# Patient Record
Sex: FEMALE | Race: WHITE | NOT HISPANIC OR LATINO | Employment: FULL TIME | URBAN - METROPOLITAN AREA
[De-identification: names, ages, dates, MRNs, and addresses within clinical notes are randomized per-mention and may not be internally consistent; named-entity substitution may affect disease eponyms.]

---

## 2023-03-26 ENCOUNTER — HOSPITAL ENCOUNTER (EMERGENCY)
Facility: HOSPITAL | Age: 33
Discharge: HOME | End: 2023-03-26
Attending: EMERGENCY MEDICINE

## 2023-03-26 ENCOUNTER — APPOINTMENT (EMERGENCY)
Dept: CT IMAGING | Facility: HOSPITAL | Age: 33
End: 2023-03-26

## 2023-03-26 ENCOUNTER — APPOINTMENT (EMERGENCY)
Dept: RADIOLOGY | Facility: HOSPITAL | Age: 33
End: 2023-03-26

## 2023-03-26 VITALS
TEMPERATURE: 98.1 F | DIASTOLIC BLOOD PRESSURE: 82 MMHG | RESPIRATION RATE: 20 BRPM | SYSTOLIC BLOOD PRESSURE: 123 MMHG | HEART RATE: 78 BPM | OXYGEN SATURATION: 100 %

## 2023-03-26 DIAGNOSIS — M62.830 SPASM OF THORACIC BACK MUSCLE: ICD-10-CM

## 2023-03-26 DIAGNOSIS — M54.6 ACUTE LEFT-SIDED THORACIC BACK PAIN: Primary | ICD-10-CM

## 2023-03-26 LAB
ANION GAP SERPL CALCULATED.3IONS-SCNC: 7 MMOL/L (ref 4–13)
BASOPHILS # BLD AUTO: 0.05 THOUSANDS/ÂΜL (ref 0–0.1)
BASOPHILS NFR BLD AUTO: 0 % (ref 0–1)
BUN SERPL-MCNC: 14 MG/DL (ref 5–25)
CALCIUM SERPL-MCNC: 9.3 MG/DL (ref 8.4–10.2)
CHLORIDE SERPL-SCNC: 111 MMOL/L (ref 96–108)
CO2 SERPL-SCNC: 23 MMOL/L (ref 21–32)
CREAT SERPL-MCNC: 0.83 MG/DL (ref 0.6–1.3)
D DIMER PPP FEU-MCNC: 0.3 UG/ML FEU
EOSINOPHIL # BLD AUTO: 0.09 THOUSAND/ÂΜL (ref 0–0.61)
EOSINOPHIL NFR BLD AUTO: 1 % (ref 0–6)
ERYTHROCYTE [DISTWIDTH] IN BLOOD BY AUTOMATED COUNT: 12.3 % (ref 11.6–15.1)
GFR SERPL CREATININE-BSD FRML MDRD: 92 ML/MIN/1.73SQ M
GLUCOSE SERPL-MCNC: 91 MG/DL (ref 65–140)
HCT VFR BLD AUTO: 38 % (ref 34.8–46.1)
HGB BLD-MCNC: 12.4 G/DL (ref 11.5–15.4)
IMM GRANULOCYTES # BLD AUTO: 0.03 THOUSAND/UL (ref 0–0.2)
IMM GRANULOCYTES NFR BLD AUTO: 0 % (ref 0–2)
LYMPHOCYTES # BLD AUTO: 3.5 THOUSANDS/ÂΜL (ref 0.6–4.47)
LYMPHOCYTES NFR BLD AUTO: 30 % (ref 14–44)
MCH RBC QN AUTO: 31.8 PG (ref 26.8–34.3)
MCHC RBC AUTO-ENTMCNC: 32.6 G/DL (ref 31.4–37.4)
MCV RBC AUTO: 97 FL (ref 82–98)
MONOCYTES # BLD AUTO: 0.79 THOUSAND/ÂΜL (ref 0.17–1.22)
MONOCYTES NFR BLD AUTO: 7 % (ref 4–12)
NEUTROPHILS # BLD AUTO: 7.21 THOUSANDS/ÂΜL (ref 1.85–7.62)
NEUTS SEG NFR BLD AUTO: 62 % (ref 43–75)
NRBC BLD AUTO-RTO: 0 /100 WBCS
PLATELET # BLD AUTO: 313 THOUSANDS/UL (ref 149–390)
PMV BLD AUTO: 9.6 FL (ref 8.9–12.7)
POTASSIUM SERPL-SCNC: 4 MMOL/L (ref 3.5–5.3)
RBC # BLD AUTO: 3.9 MILLION/UL (ref 3.81–5.12)
SODIUM SERPL-SCNC: 141 MMOL/L (ref 135–147)
WBC # BLD AUTO: 11.67 THOUSAND/UL (ref 4.31–10.16)

## 2023-03-26 RX ORDER — METHOCARBAMOL 500 MG/1
500 TABLET, FILM COATED ORAL 2 TIMES DAILY
Qty: 20 TABLET | Refills: 0 | Status: SHIPPED | OUTPATIENT
Start: 2023-03-26

## 2023-03-26 RX ORDER — OXYCODONE HYDROCHLORIDE 5 MG/1
5 TABLET ORAL EVERY 6 HOURS PRN
Qty: 4 TABLET | Refills: 0 | Status: SHIPPED | OUTPATIENT
Start: 2023-03-26 | End: 2023-03-27

## 2023-03-26 RX ORDER — LIDOCAINE 50 MG/G
1 PATCH TOPICAL ONCE
Status: DISCONTINUED | OUTPATIENT
Start: 2023-03-26 | End: 2023-03-27 | Stop reason: HOSPADM

## 2023-03-26 RX ORDER — OXYCODONE HYDROCHLORIDE 5 MG/1
5 TABLET ORAL ONCE
Status: COMPLETED | OUTPATIENT
Start: 2023-03-26 | End: 2023-03-26

## 2023-03-26 RX ORDER — ACETAMINOPHEN 325 MG/1
975 TABLET ORAL ONCE
Status: COMPLETED | OUTPATIENT
Start: 2023-03-26 | End: 2023-03-26

## 2023-03-26 RX ORDER — LIDOCAINE 50 MG/G
1 PATCH TOPICAL DAILY
Qty: 15 PATCH | Refills: 0 | Status: SHIPPED | OUTPATIENT
Start: 2023-03-26

## 2023-03-26 RX ORDER — KETOROLAC TROMETHAMINE 30 MG/ML
15 INJECTION, SOLUTION INTRAMUSCULAR; INTRAVENOUS ONCE
Status: COMPLETED | OUTPATIENT
Start: 2023-03-26 | End: 2023-03-26

## 2023-03-26 RX ORDER — METHOCARBAMOL 500 MG/1
500 TABLET, FILM COATED ORAL ONCE
Status: COMPLETED | OUTPATIENT
Start: 2023-03-26 | End: 2023-03-26

## 2023-03-26 RX ADMIN — METHOCARBAMOL 500 MG: 500 TABLET ORAL at 19:14

## 2023-03-26 RX ADMIN — LIDOCAINE 1 PATCH: 50 PATCH CUTANEOUS at 19:12

## 2023-03-26 RX ADMIN — ACETAMINOPHEN 975 MG: 325 TABLET ORAL at 19:12

## 2023-03-26 RX ADMIN — KETOROLAC TROMETHAMINE 15 MG: 30 INJECTION, SOLUTION INTRAMUSCULAR; INTRAVENOUS at 20:55

## 2023-03-26 RX ADMIN — OXYCODONE HYDROCHLORIDE 5 MG: 5 TABLET ORAL at 20:55

## 2023-03-26 NOTE — Clinical Note
Florencia Hooker was seen and treated in our emergency department on 3/26/2023  Diagnosis:     Paty Lowe  may return to work on return date  She may return on this date: 03/29/2023         If you have any questions or concerns, please don't hesitate to call        Jimbo Mario PA-C    ______________________________           _______________          _______________  Hospital Representative                              Date                                Time

## 2023-03-26 NOTE — ED PROVIDER NOTES
tHistory  Chief Complaint   Patient presents with   • Back Pain     Back pain that has been off and on since Friday  Denies injuries  Patient is a 20-year-old female presenting to the ED for evaluation of left-sided thoracic back pain x3 days  Patient states that she developed left-sided back pain on Friday  The pain is over the left mid to upper back, primarily at the level of the scapula  She denies any falls, trauma, injuries or heavy lifting prior to the onset of pain  She was seen at urgent care on Friday and prescribed Toradol  She did not  this medication initially as her back pain seemed to be resolving yesterday; however, she woke up this morning with worsening pain and tried taking a Toradol this morning without relief  She has been applying heat, lidocaine, etc without relief  She took another NSAID (Derrek's) around 4PM with no relief  She feels that the pain is making it difficult to take a deep breath  She also reports some pain and tingling down the left arm on Friday and today, no weakness  She denies any fevers, vomiting, chest pain, SOB, palpitations, abdominal pain, vomiting, diarrhea, constipation or urinary symptoms  Prior to Admission Medications   Prescriptions Last Dose Informant Patient Reported? Taking? Lidocaine Viscous HCl (XYLOCAINE) 2 % mucosal solution   No No   Sig: Mix 3 tsp (15 ml) in glass of water and gargle and spit every 6 hours as needed for throat pain  Avoid swallowing if possible  PARoxetine (PAXIL) 20 mg tablet   No No   Sig: Take 1 tablet (20 mg total) by mouth daily   Vestura 3-0 02 MG per tablet   No No   Sig: TAKE 1 TABLET BY MOUTH EVERY DAY   Patient not taking: Reported on 12/23/2022   butalbital-acetaminophen-caffeine-codeine (FIORICET WITH CODEINE) -33-30 MG per capsule   No No   Sig: TAKE 1 CAPSULE BY MOUTH EVERY 6 HOURS AS NEEDED FOR HEADACHES     cholecalciferol (VITAMIN D3) 1,000 units tablet   No No   Sig: TAKE 1 TABLET BY MOUTH EVERY DAY   hydrOXYzine HCL (ATARAX) 25 mg tablet   No No   Sig: Take 1 tablet (25 mg total) by mouth daily at bedtime as needed for anxiety (insomnia)   Patient not taking: Reported on 7/29/2022   methocarbamol (ROBAXIN) 500 mg tablet   No No   Sig: Take 1 tablet (500 mg total) by mouth daily at bedtime   Patient not taking: Reported on 12/23/2022   ondansetron (ZOFRAN) 4 mg tablet   No No   Sig: TAKE 1 TABLET BY MOUTH EVERY 8 HOURS AS NEEDED FOR NAUSEA AND VOMITING   topiramate (TOPAMAX) 50 MG tablet   No No   Sig: TAKE 1 TABLET BY MOUTH TWICE A DAY      Facility-Administered Medications: None       Past Medical History:   Diagnosis Date   • Anxiety        Past Surgical History:   Procedure Laterality Date   • WISDOM TOOTH EXTRACTION         Family History   Problem Relation Age of Onset   • No Known Problems Mother    • No Known Problems Sister    • Melanoma Other         Unspecifed Grandmother   • Liver cancer Maternal Grandmother    • No Known Problems Paternal Grandmother    • No Known Problems Paternal Grandfather    • No Known Problems Maternal Aunt    • Substance Abuse Neg Hx         Mother/Father   • Mental illness Neg Hx         Mother/Father     I have reviewed and agree with the history as documented  E-Cigarette/Vaping     E-Cigarette/Vaping Substances     Social History     Tobacco Use   • Smoking status: Some Days   • Smokeless tobacco: Never   • Tobacco comments:     2 cigarettes daily   Substance Use Topics   • Alcohol use: Yes     Comment: Social   • Drug use: No       Review of Systems   Constitutional: Negative for appetite change, chills, fatigue and fever  HENT: Negative for congestion, rhinorrhea and sore throat  Eyes: Negative for photophobia and visual disturbance  Respiratory: Negative for cough and wheezing  Cardiovascular: Negative for chest pain, palpitations and leg swelling     Gastrointestinal: Negative for abdominal pain, blood in stool, constipation, diarrhea, nausea and vomiting  Genitourinary: Negative for difficulty urinating, dysuria, flank pain, frequency, hematuria and urgency  Musculoskeletal: Positive for back pain  Negative for arthralgias, joint swelling, myalgias and neck pain  All other systems reviewed and are negative  Physical Exam  Physical Exam  Vitals and nursing note reviewed  Constitutional:       General: She is awake  Appearance: Normal appearance  She is well-developed  She is not toxic-appearing or diaphoretic  HENT:      Head: Normocephalic and atraumatic  Right Ear: External ear normal       Left Ear: External ear normal       Nose: Nose normal       Mouth/Throat:      Lips: Pink  Mouth: Mucous membranes are moist    Eyes:      General: Lids are normal  No scleral icterus  Conjunctiva/sclera: Conjunctivae normal       Pupils: Pupils are equal, round, and reactive to light  Cardiovascular:      Rate and Rhythm: Normal rate and regular rhythm  Pulses: Normal pulses  Radial pulses are 2+ on the right side and 2+ on the left side  Heart sounds: Normal heart sounds, S1 normal and S2 normal    Pulmonary:      Effort: Pulmonary effort is normal  No accessory muscle usage  Breath sounds: Normal breath sounds  No stridor  No decreased breath sounds, wheezing, rhonchi or rales  Abdominal:      General: Abdomen is flat  Bowel sounds are normal  There is no distension  Palpations: Abdomen is soft  Tenderness: There is no abdominal tenderness  There is no right CVA tenderness, left CVA tenderness, guarding or rebound  Musculoskeletal:      Cervical back: Full passive range of motion without pain and neck supple  No signs of trauma, tenderness or bony tenderness  No pain with movement  Thoracic back: Spasms and tenderness present  Back:       Right lower leg: No edema  Left lower leg: No edema        Comments: Tenderness to palpation of left-sided thoracic paraspinal muscles and over the left trapezius muscle distribution with palpable muscle spasm  No midline tenderness, deformities or step-offs  Strength 5/5 in bilateral upper extremities  Sensation intact bilaterally  Radial pulse 2+ bilaterally  Lymphadenopathy:      Cervical: No cervical adenopathy  Skin:     General: Skin is warm and dry  Capillary Refill: Capillary refill takes less than 2 seconds  Coloration: Skin is not cyanotic, jaundiced or pale  Neurological:      Mental Status: She is alert and oriented to person, place, and time  GCS: GCS eye subscore is 4  GCS verbal subscore is 5  GCS motor subscore is 6  Gait: Gait normal    Psychiatric:         Mood and Affect: Mood normal          Speech: Speech normal          Behavior: Behavior is cooperative           Vital Signs  ED Triage Vitals   Temperature Pulse Respirations Blood Pressure SpO2   03/26/23 1756 03/26/23 1756 03/26/23 1756 03/26/23 1756 03/26/23 1756   98 1 °F (36 7 °C) 78 20 123/82 100 %      Temp Source Heart Rate Source Patient Position - Orthostatic VS BP Location FiO2 (%)   03/26/23 1756 03/26/23 1756 03/26/23 1756 03/26/23 1756 --   Oral Monitor Sitting Right arm       Pain Score       03/26/23 1912       8           Vitals:    03/26/23 1756   BP: 123/82   Pulse: 78   Patient Position - Orthostatic VS: Sitting         Visual Acuity      ED Medications  Medications   lidocaine (LIDODERM) 5 % patch 1 patch (1 patch Topical Medication Applied 3/26/23 1912)   methocarbamol (ROBAXIN) tablet 500 mg (500 mg Oral Given 3/26/23 1914)   acetaminophen (TYLENOL) tablet 975 mg (975 mg Oral Given 3/26/23 1912)   ketorolac (TORADOL) injection 15 mg (15 mg Intravenous Given 3/26/23 2055)   oxyCODONE (ROXICODONE) IR tablet 5 mg (5 mg Oral Given 3/26/23 2055)       Diagnostic Studies  Results Reviewed     Procedure Component Value Units Date/Time    Basic metabolic panel [[id number]]  (Abnormal) Collected: 03/26/23 1920    Lab Status: Final result Specimen: Blood from Arm, Right Updated: 03/26/23 1951     Sodium 141 mmol/L      Potassium 4 0 mmol/L      Chloride 111 mmol/L      CO2 23 mmol/L      ANION GAP 7 mmol/L      BUN 14 mg/dL      Creatinine 0 83 mg/dL      Glucose 91 mg/dL      Calcium 9 3 mg/dL      eGFR 92 ml/min/1 73sq m     Narrative:      [place] guidelines for Chronic Kidney Disease (CKD):   •  Stage 1 with normal or high GFR (GFR > 90 mL/min/1 73 square meters)  •  Stage 2 Mild CKD (GFR = 60-89 mL/min/1 73 square meters)  •  Stage 3A Moderate CKD (GFR = 45-59 mL/min/1 73 square meters)  •  Stage 3B Moderate CKD (GFR = 30-44 mL/min/1 73 square meters)  •  Stage 4 Severe CKD (GFR = 15-29 mL/min/1 73 square meters)  •  Stage 5 End Stage CKD (GFR <15 mL/min/1 73 square meters)  Note: GFR calculation is accurate only with a steady state creatinine    D-Dimer [[id number]]  (Normal) Collected: 03/26/23 1920    Lab Status: Final result Specimen: Blood from Arm, Right Updated: 03/26/23 1945     D-Dimer, Quant 0 30 ug/ml FEU     CBC and differential [[id number]]  (Abnormal) Collected: 03/26/23 1920    Lab Status: Final result Specimen: Blood from Arm, Right Updated: 03/26/23 1925     WBC 11 67 Thousand/uL      RBC 3 90 Million/uL      Hemoglobin 12 4 g/dL      Hematocrit 38 0 %      MCV 97 fL      MCH 31 8 pg      MCHC 32 6 g/dL      RDW 12 3 %      MPV 9 6 fL      Platelets 388 Thousands/uL      nRBC 0 /100 WBCs      Neutrophils Relative 62 %      Immat GRANS % 0 %      Lymphocytes Relative 30 %      Monocytes Relative 7 %      Eosinophils Relative 1 %      Basophils Relative 0 %      Neutrophils Absolute 7 21 Thousands/µL      Immature Grans Absolute 0 03 Thousand/uL      Lymphocytes Absolute 3 50 Thousands/µL      Monocytes Absolute 0 79 Thousand/µL      Eosinophils Absolute 0 09 Thousand/µL      Basophils Absolute 0 05 Thousands/µL                  XR thoracic spine 2 views   ED Interpretation by LINDA Bautista (03/26 2127)   No acute bony abnormality identified by me  CT chest without contrast    (Results Pending)              Procedures  Procedures         ED Course                               SBIRT 22yo+    Flowsheet Row Most Recent Value   SBIRT (25 yo +)    In order to provide better care to our patients, we are screening all of our patients for alcohol and drug use  Would it be okay to ask you these screening questions? Yes Filed at: 03/26/2023 1758   Initial Alcohol Screen: US AUDIT-C     1  How often do you have a drink containing alcohol? 0 Filed at: 03/26/2023 1758   2  How many drinks containing alcohol do you have on a typical day you are drinking? 0 Filed at: 03/26/2023 1758   3a  Male UNDER 65: How often do you have five or more drinks on one occasion? 0 Filed at: 03/26/2023 1758   3b  FEMALE Any Age, or MALE 65+: How often do you have 4 or more drinks on one occassion? 0 Filed at: 03/26/2023 1758   Audit-C Score 0 Filed at: 03/26/2023 1758   RENATE: How many times in the past year have you    Used an illegal drug or used a prescription medication for non-medical reasons? Never Filed at: 03/26/2023 5633 N  Hakeem Sarabia  Patient is a 68-year-old female presenting to the ED for evaluation of left-sided thoracic back pain x3 days  D-dimer negative  Labs unremarkable  X-ray thoracic spine shows no acute abnormalities  Patient initially had improvement after Robaxin; however, on my repeat evaluation the patient is crying and says that the pain has returned and is severe  She is concerned that there is something more going on  Will order CT chest to further evaluate  Patient signed out to overnight AP pending results of imaging and final disposition   If normal, plan to discharge with Robaxin, lidocaine patches and [facility] referral     Acute left-sided thoracic back pain: acute illness or injury  Spasm of thoracic back muscle: acute illness or injury  Amount and/or Complexity of Data Reviewed  Labs: ordered  Radiology: ordered  Risk  OTC drugs  Prescription drug management  Disposition  Final diagnoses:   Acute left-sided thoracic back pain   Spasm of thoracic back muscle     Time reflects when diagnosis was documented in both MDM as applicable and the Disposition within this note     Time User Action Codes Description Comment    3/26/2023  8:30 PM Brenton Rico [M54 6] Acute left-sided thoracic back pain     3/26/2023  8:32 PM Brenton Rico [[id number]] Spasm of thoracic back muscle       ED Disposition     ED Disposition   Discharge    Condition   Stable    Date/Time   Sun Mar 26, 2023  8:32 PM    575 Melinda Alaniz discharge to home/self care  Follow-up Information     Follow up With Specialties Details Why Contact Info Additional Information    [facility] Physical Therapy Schedule an appointment as soon as possible for a visit   [phone]    [place] [address] Emergency Department Emergency Medicine  If symptoms worsen [address] [facility] [address] [facility] Emergency Department, [facility] [address], [place], [place], [address]          Patient's Medications   Discharge Prescriptions    LIDOCAINE (LIDODERM) 5 %    Apply 1 patch topically over 12 hours daily Remove & Discard patch within 12 hours or as directed by MD       Start Date: 3/26/2023 End Date: --       Order Dose: 1 patch       Quantity: 15 patch    Refills: 0    METHOCARBAMOL (ROBAXIN) 500 MG TABLET    Take 1 tablet (500 mg total) by mouth 2 (two) times a day       Start Date: 3/26/2023 End Date: --       Order Dose: 500 mg       Quantity: 20 tablet    Refills: 0       No discharge procedures on file      PDMP Review       Value Time User    PDMP Reviewed  Yes 7/29/2022  8:29 AM Shwetha Kumar MD          ED Provider  Electronically Signed by           Ashkan Tejada PA-C  03/28/23 1200

## 2023-03-26 NOTE — Clinical Note
Reshma Tapia was seen and treated in our emergency department on 3/26/2023  Diagnosis:     Socorro April  may return to work on return date  She may return on this date: 03/29/2023         If you have any questions or concerns, please don't hesitate to call        Yossi Marrero PA-C    ______________________________           _______________          _______________  Hospital Representative                              Date                                Time

## 2023-03-27 NOTE — DISCHARGE INSTRUCTIONS
Continue the prescribed ketorolac (Toradol) 4 times daily with food  Use 975 mg of Tylenol up to 3 times daily for breakthrough pain  Use heating pad for comfort  Use topical lidocaine patches as directed  He is prescribed oxycodone 5 mg up to 4 times daily for severe pain  Follow-up with comprehensive spine program for further evaluation  Return to the ER if you develop severe headache, neck stiffness, difficulty breathing, vomiting, weakness, numbness, and lethargy

## 2023-03-27 NOTE — ED CARE HANDOFF
"Emergency Department Sign Out Note        [facility]  See [facility] note  The patient, Serenity Thornton, was evaluated by the previous provider for left-sided upper back pain  [facility] for further documentation  Workup Completed:  BMP, CBC, d-dimer, XR thoracic spine    ED Course / Workup Pending (followup):  CT chest without contrast                                  ED Course as of 03/27/23 0343   Sun Mar 26, 2023   2123 Triage vital signs stable  1261 Basic metabolic panel(!)  No electrolyte derangement, no HARLEY   2126 D-Dimer, Quant: 0 30  Wells 0, PERC 0, D-dimer negative, PE very unlikely   2126 CBC and differential(!)  Mild leukocytosis without shift, no gross anemia   2135 I introduced myself to the patient and notified her that I am assuming care from off going TERESITA Poppy  Patient has normal S1-S2, clear lung sounds throughout, diffuse tenderness to palpation of left trapezius muscles and left upper back paraspinal muscles  Patient has full range of motion of neck, 5 out of 5 strength throughout, sensation is intact  Patient aware of outstanding CT chest imaging  Suspicion highest for musculoskeletal strain/sprain and muscle spasm rather than bony abnormality  2159 CT chest without contrast  IMPRESSION:     Unremarkable CT scan of the chest    2226 Patient and her wife updated with results  Patient continues to endorse moderate to severe pain  I discussed plan with use of NSAIDs, Tylenol, heat therapy, topical lidocaine patch and/or Bengay/Aspercreme  Patient tearful and expressing concern for severe pain  She notes the 5 mg oxycodone helped \"take the edge off\" the most   Plan made for small amount of oxycodone for the next day with close follow-up with PCP and, to spine  Patient agreeable to plan     63 DC paperwork reviewed with emphasis on new prescriptions, follow-up with " primary care and comprehensive spine, and strict return precautions  Patient in agreement has no further questions at this time  Procedures  Medical Decision Making  DDx including but not limited to: strain, sprain, radiculopathy, muscle spasm; considered but doubt fracture or infectious process    See ED course for further MDM and disposition discussion    Acute left-sided thoracic back pain: acute illness or injury  Spasm of thoracic back muscle: acute illness or injury  Amount and/or Complexity of Data Reviewed  Labs: ordered  Decision-making details documented in ED Course  Radiology: ordered and independent interpretation performed  Decision-making details documented in ED Course  Risk  OTC drugs  Prescription drug management  Disposition  Final diagnoses:   Acute left-sided thoracic back pain   Spasm of thoracic back muscle     Time reflects when diagnosis was documented in both MDM as applicable and the Disposition within this note     Time User Action Codes Description Comment    3/26/2023  8:30 PM Juan Rico [M54 6] Acute left-sided thoracic back pain     3/26/2023  8:32 PM Juan Rico [[id number]] Spasm of thoracic back muscle       ED Disposition     ED Disposition   Discharge    Condition   Stable    Date/Time   Sun Mar 26, 2023  8:32 PM    Jerry Alaniz discharge to home/self care                 Follow-up Information     Follow up With Specialties Details Why Contact Info Additional Information    [facility] Physical Therapy Schedule an appointment as soon as possible for a visit   [phone]    [facility] Emergency Department Emergency Medicine  If symptoms worsen [address] Emergency Department, [address], [place], [place], [address]        Discharge Medication List as of 3/26/2023 10:32 PM      START taking these medications    Details   lidocaine (Lidoderm) 5 % Apply 1 patch topically over 12 hours daily Remove & Discard patch within 12 hours or as directed by MD, Starting Sun 3/26/2023, Normal      !! methocarbamol (ROBAXIN) 500 mg tablet Take 1 tablet (500 mg total) by mouth 2 (two) times a day, Starting Sun 3/26/2023, Normal      oxyCODONE (Roxicodone) 5 immediate release tablet Take 1 tablet (5 mg total) by mouth every 6 (six) hours as needed for moderate pain for up to 1 day Max Daily Amount: 20 mg, Starting Sun 3/26/2023, Until Mon 3/27/2023 at 2359, Print       !! - Potential duplicate medications found  Please discuss with provider  CONTINUE these medications which have NOT CHANGED    Details   butalbital-acetaminophen-caffeine-codeine (FIORICET WITH CODEINE) -86-30 MG per capsule TAKE 1 CAPSULE BY MOUTH EVERY 6 HOURS AS NEEDED FOR HEADACHES , Normal      cholecalciferol (VITAMIN D3) 1,000 units tablet TAKE 1 TABLET BY MOUTH EVERY DAY, Normal      hydrOXYzine HCL (ATARAX) 25 mg tablet Take 1 tablet (25 mg total) by mouth daily at bedtime as needed for anxiety (insomnia), Starting Wed 1/26/2022, Normal      Lidocaine Viscous HCl (XYLOCAINE) 2 % mucosal solution Mix 3 tsp (15 ml) in glass of water and gargle and spit every 6 hours as needed for throat pain  Avoid swallowing if possible , Normal      !! methocarbamol (ROBAXIN) 500 mg tablet Take 1 tablet (500 mg total) by mouth daily at bedtime, Starting Tue 7/26/2022, Normal      ondansetron (ZOFRAN) 4 mg tablet TAKE 1 TABLET BY MOUTH EVERY 8 HOURS AS NEEDED FOR NAUSEA AND VOMITING, Normal      PARoxetine (PAXIL) 20 mg tablet Take 1 tablet (20 mg total) by mouth daily, Starting Tue 1/3/2023, Normal      topiramate (TOPAMAX) 50 MG tablet TAKE 1 TABLET BY MOUTH TWICE A DAY, Normal      Vestura 3-0 02 MG per tablet TAKE 1 TABLET BY MOUTH EVERY DAY, Normal       !! - Potential duplicate medications found   Please discuss with provider                 ED Provider  Electronically Signed by     Ros Sharma  03/27/23 8673

## 2023-03-28 ENCOUNTER — TELEPHONE (OUTPATIENT)
Dept: PHYSICAL THERAPY | Facility: OTHER | Age: 33
End: 2023-03-28

## 2023-03-28 NOTE — TELEPHONE ENCOUNTER
Message left for patient regarding referral entered for  [facility]      Contact information, hours of operation and VM instructions left  Nurse requested patient to CB if needed and leave Full Name &  on VM  One of the [facility] staff will return her call  This is 1st attempt to reach patient    Referral deferred for f/u attempt per protocol

## 2023-03-31 NOTE — TELEPHONE ENCOUNTER
Message left for patient regarding referral entered for  [facility]      Contact information, hours of operation and VM instructions left  Nurse requested patient to CB if needed and leave Full Name &  on VM  One of the staff members will return the call  This is 1st attempt to reach the patient    Referral deferred for f/u attempt per protocol

## 2023-04-02 DIAGNOSIS — F33.2 MODERATELY SEVERE RECURRENT MAJOR DEPRESSION (HCC): ICD-10-CM

## 2023-04-03 RX ORDER — PAROXETINE HYDROCHLORIDE 20 MG/1
TABLET, FILM COATED ORAL
Qty: 90 TABLET | Refills: 0 | Status: SHIPPED | OUTPATIENT
Start: 2023-04-03

## 2023-04-11 DIAGNOSIS — G43.111 INTRACTABLE MIGRAINE WITH AURA WITH STATUS MIGRAINOSUS: ICD-10-CM

## 2023-04-11 RX ORDER — ONDANSETRON 4 MG/1
4 TABLET, FILM COATED ORAL EVERY 8 HOURS PRN
Qty: 20 TABLET | Refills: 0 | Status: SHIPPED | OUTPATIENT
Start: 2023-04-11 | End: 2023-05-26 | Stop reason: SDUPTHER

## 2023-04-11 RX ORDER — BUTALBITAL, ACETAMINOPHEN, CAFFEINE AND CODEINE PHOSPHATE 50; 325; 40; 30 MG/1; MG/1; MG/1; MG/1
1 CAPSULE ORAL EVERY 6 HOURS PRN
Qty: 15 CAPSULE | Refills: 0 | Status: SHIPPED | OUTPATIENT
Start: 2023-04-11 | End: 2023-05-26 | Stop reason: SDUPTHER

## 2023-05-02 ENCOUNTER — VBI (OUTPATIENT)
Dept: ADMINISTRATIVE | Facility: OTHER | Age: 33
End: 2023-05-02

## 2023-05-26 DIAGNOSIS — G43.111 INTRACTABLE MIGRAINE WITH AURA WITH STATUS MIGRAINOSUS: ICD-10-CM

## 2023-05-26 DIAGNOSIS — G44.229 CHRONIC TENSION-TYPE HEADACHE, NOT INTRACTABLE: ICD-10-CM

## 2023-05-26 RX ORDER — TOPIRAMATE 50 MG/1
TABLET, FILM COATED ORAL
Qty: 180 TABLET | Refills: 0 | Status: SHIPPED | OUTPATIENT
Start: 2023-05-26

## 2023-05-26 RX ORDER — BUTALBITAL, ACETAMINOPHEN, CAFFEINE AND CODEINE PHOSPHATE 50; 325; 40; 30 MG/1; MG/1; MG/1; MG/1
1 CAPSULE ORAL EVERY 6 HOURS PRN
Qty: 15 CAPSULE | Refills: 0 | Status: SHIPPED | OUTPATIENT
Start: 2023-05-26

## 2023-05-26 RX ORDER — ONDANSETRON 4 MG/1
4 TABLET, FILM COATED ORAL EVERY 8 HOURS PRN
Qty: 20 TABLET | Refills: 0 | Status: SHIPPED | OUTPATIENT
Start: 2023-05-26

## 2023-06-05 ENCOUNTER — RA CDI HCC (OUTPATIENT)
Dept: OTHER | Facility: HOSPITAL | Age: 33
End: 2023-06-05

## 2023-06-05 NOTE — PROGRESS NOTES
[facility]       Chart reviewed, no opportunity found: CHART REVIEWED, NO OPPORTUNITY FOUND        Patients Insurance        Commercial Insurance: [address] [place] [address]

## 2023-06-06 ENCOUNTER — TELEPHONE (OUTPATIENT)
Dept: PSYCHIATRY | Facility: CLINIC | Age: 33
End: 2023-06-06

## 2023-06-06 NOTE — TELEPHONE ENCOUNTER
Patient called in requesting to schedule a f/u appt with provider, writer transferred caller to resident line for scheduling asistance

## 2023-06-13 ENCOUNTER — OFFICE VISIT (OUTPATIENT)
Dept: FAMILY MEDICINE CLINIC | Facility: CLINIC | Age: 33
End: 2023-06-13
Payer: COMMERCIAL

## 2023-06-13 VITALS
DIASTOLIC BLOOD PRESSURE: 74 MMHG | HEIGHT: 61 IN | HEART RATE: 96 BPM | SYSTOLIC BLOOD PRESSURE: 110 MMHG | TEMPERATURE: 97.6 F | WEIGHT: 137.2 LBS | OXYGEN SATURATION: 98 % | BODY MASS INDEX: 25.9 KG/M2

## 2023-06-13 DIAGNOSIS — G43.111 INTRACTABLE MIGRAINE WITH AURA WITH STATUS MIGRAINOSUS: ICD-10-CM

## 2023-06-13 DIAGNOSIS — Z00.00 ANNUAL PHYSICAL EXAM: Primary | ICD-10-CM

## 2023-06-13 DIAGNOSIS — F33.2 MODERATELY SEVERE RECURRENT MAJOR DEPRESSION (HCC): ICD-10-CM

## 2023-06-13 DIAGNOSIS — G44.229 CHRONIC TENSION-TYPE HEADACHE, NOT INTRACTABLE: ICD-10-CM

## 2023-06-13 DIAGNOSIS — M54.6 LEFT-SIDED THORACIC BACK PAIN, UNSPECIFIED CHRONICITY: ICD-10-CM

## 2023-06-13 DIAGNOSIS — F41.9 ANXIETY DISORDER, UNSPECIFIED TYPE: ICD-10-CM

## 2023-06-13 PROCEDURE — 99214 OFFICE O/P EST MOD 30 MIN: CPT | Performed by: FAMILY MEDICINE

## 2023-06-13 PROCEDURE — 99395 PREV VISIT EST AGE 18-39: CPT | Performed by: FAMILY MEDICINE

## 2023-06-13 RX ORDER — CYCLOBENZAPRINE HCL 10 MG
10 TABLET ORAL 3 TIMES DAILY PRN
Qty: 60 TABLET | Refills: 0 | Status: SHIPPED | OUTPATIENT
Start: 2023-06-13

## 2023-06-13 RX ORDER — ONDANSETRON 4 MG/1
4 TABLET, FILM COATED ORAL EVERY 8 HOURS PRN
Qty: 20 TABLET | Refills: 0 | Status: SHIPPED | OUTPATIENT
Start: 2023-06-13

## 2023-06-13 RX ORDER — BUPROPION HYDROCHLORIDE 300 MG/1
TABLET ORAL
COMMUNITY
Start: 2023-05-28

## 2023-06-13 NOTE — PROGRESS NOTES
ADULT ANNUAL [address] [facility]    NAME: Dionicio Staples  AGE: 27 y o  SEX: female  : 1990     DATE: 2023     Assessment and Plan:     Problem List Items Addressed This Visit    None  Visit Diagnoses     Annual physical exam    -  Primary    BMI 25 0-25 9,adult              Immunizations and preventive care screenings were discussed with patient today  Appropriate education was printed on patient's after visit summary  Counseling:  Alcohol/drug use: discussed moderation in alcohol intake, the recommendations for healthy alcohol use, and avoidance of illicit drug use  Dental Health: discussed importance of regular tooth brushing, flossing, and dental visits  Injury prevention: discussed safety/seat belts, safety helmets, smoke detectors, carbon dioxide detectors, and smoking near bedding or upholstery  Sexual health: discussed sexually transmitted diseases, partner selection, use of condoms, avoidance of unintended pregnancy, and contraceptive alternatives  · Exercise: the importance of regular exercise/physical activity was discussed  Recommend exercise 3-5 times per week for at least 30 minutes  Return in 6 months (on 2023)  Chief Complaint:     Chief Complaint   Patient presents with   • Physical Exam      History of Present Illness:     Adult Annual Physical   Patient here for a comprehensive physical exam  The patient reports no problems  Diet and Physical Activity  · Diet/Nutrition: well balanced diet  · Exercise: no formal exercise        Depression Screening  PHQ-2/9 Depression Screening    Little interest or pleasure in doing things: 1 - several days  Feeling down, depressed, or hopeless: 1 - several days  Trouble falling or staying asleep, or sleeping too much: 3 - nearly every day  Feeling tired or having little energy: 3 - nearly every day  Poor appetite or overeating: 3 - nearly every day  Feeling bad about yourself - or that you are a failure or have let yourself or your family down: 1 - several days  Trouble concentrating on things, such as reading the newspaper or watching television: 3 - nearly every day  Moving or speaking so slowly that other people could have noticed  Or the opposite - being so fidgety or restless that you have been moving around a lot more than usual: 1 - several days  Thoughts that you would be better off dead, or of hurting yourself in some way: 0 - not at all  PHQ-9 Score: 16   PHQ-9 Interpretation: Moderately severe depression        General Health  · Sleep: sleeps well  · Hearing: normal - bilateral   · Vision: no vision problems  · Dental: regular dental visits  Review of Systems:     Review of Systems   Constitutional: Negative for activity change, chills, fatigue and fever  HENT: Negative for congestion, ear pain, sinus pressure and sore throat  Eyes: Negative for redness, itching and visual disturbance  Respiratory: Negative for cough and shortness of breath  Cardiovascular: Negative for chest pain and palpitations  Gastrointestinal: Negative for abdominal pain, diarrhea and nausea  Endocrine: Negative for cold intolerance and heat intolerance  Genitourinary: Negative for dysuria, flank pain and frequency  Musculoskeletal: Negative for arthralgias, back pain, gait problem and myalgias  Skin: Negative for color change  Allergic/Immunologic: Negative for environmental allergies  Neurological: Negative for dizziness, numbness and headaches  Psychiatric/Behavioral: Negative for behavioral problems and sleep disturbance        Past Medical History:     Past Medical History:   Diagnosis Date   • Anxiety       Past Surgical History:     Past Surgical History:   Procedure Laterality Date   • WISDOM TOOTH EXTRACTION        Social History:     Social History     Socioeconomic History   • Marital status: /Civil Union     Spouse name: None   • Number of children: None   • Years of education: None   • Highest education level: None   Occupational History   • None   Tobacco Use   • Smoking status: Former     Types: Cigarettes     Start date:      Quit date: 2018     Years since quittin 4   • Smokeless tobacco: Never   • Tobacco comments:     2 cigarettes daily   Substance and Sexual Activity   • Alcohol use: Yes     Comment: Social   • Drug use: No   • Sexual activity: None   Other Topics Concern   • None   Social History Narrative    Always uses seat belt    Daily caffeine consumption, 1 serving/day    Feels safe at home     Social Determinants of Health     Financial Resource Strain: Not on file   Food Insecurity: Not on file   Transportation Needs: Not on file   Physical Activity: Not on file   Stress: Not on file   Social Connections: Not on file   Intimate Partner Violence: Not on file   Housing Stability: Not on file      Family History:     Family History   Problem Relation Age of Onset   • No Known Problems Mother    • No Known Problems Sister    • Melanoma Other         Unspecifed Grandmother   • Liver cancer Maternal Grandmother    • No Known Problems Paternal Grandmother    • No Known Problems Paternal Grandfather    • No Known Problems Maternal Aunt    • Substance Abuse Neg Hx         Mother/Father   • Mental illness Neg Hx         Mother/Father      Current Medications:     Current Outpatient Medications   Medication Sig Dispense Refill   • buPROPion (WELLBUTRIN XL) 300 mg 24 hr tablet      • butalbital-acetaminophen-caffeine-codeine (FIORICET WITH CODEINE) -70-30 MG per capsule Take 1 capsule by mouth every 6 (six) hours as needed for headaches 15 capsule 0   • cholecalciferol (VITAMIN D3) 1,000 units tablet TAKE 1 TABLET BY MOUTH EVERY DAY 90 tablet 1   • hydrOXYzine HCL (ATARAX) 25 mg tablet Take 1 tablet (25 mg total) by mouth daily at bedtime as needed for anxiety (insomnia) 30 tablet 1   • ondansetron (ZOFRAN) "4 mg tablet Take 1 tablet (4 mg total) by mouth every 8 (eight) hours as needed for nausea or vomiting 20 tablet 0   • sertraline (ZOLOFT) 50 mg tablet      • topiramate (TOPAMAX) 50 MG tablet TAKE 1 TABLET BY MOUTH TWICE A  tablet 0   • lidocaine (Lidoderm) 5 % Apply 1 patch topically over 12 hours daily Remove & Discard patch within 12 hours or as directed by MD 15 patch 0   • Lidocaine Viscous HCl (XYLOCAINE) 2 % mucosal solution Mix 3 tsp (15 ml) in glass of water and gargle and spit every 6 hours as needed for throat pain  Avoid swallowing if possible  100 mL 0   • methocarbamol (ROBAXIN) 500 mg tablet Take 1 tablet (500 mg total) by mouth daily at bedtime (Patient not taking: Reported on 12/23/2022) 30 tablet 2   • methocarbamol (ROBAXIN) 500 mg tablet Take 1 tablet (500 mg total) by mouth 2 (two) times a day 20 tablet 0   • PARoxetine (PAXIL) 20 mg tablet TAKE 1 TABLET BY MOUTH EVERY DAY 90 tablet 0   • Vestura 3-0 02 MG per tablet TAKE 1 TABLET BY MOUTH EVERY DAY (Patient not taking: Reported on 12/23/2022) 84 tablet 1     No current facility-administered medications for this visit  Allergies:     No Known Allergies   Physical Exam:     /74 (BP Location: Left arm, Patient Position: Sitting, Cuff Size: Adult)   Pulse 96   Temp 97 6 °F (36 4 °C)   Ht 5' 1\" (1 549 m)   Wt 62 2 kg (137 lb 3 2 oz)   SpO2 98%   BMI 25 92 kg/m²     Physical Exam  Vitals reviewed  Constitutional:       General: She is not in acute distress  Appearance: She is well-developed  She is not diaphoretic  HENT:      Head: Normocephalic and atraumatic  Right Ear: External ear normal       Left Ear: External ear normal       Nose: Nose normal    Eyes:      General:         Right eye: No discharge  Left eye: No discharge  Pupils: Pupils are equal, round, and reactive to light  Cardiovascular:      Rate and Rhythm: Normal rate and regular rhythm  Heart sounds: Normal heart sounds   No " murmur heard  No friction rub  No gallop  Pulmonary:      Effort: Pulmonary effort is normal  No respiratory distress  Breath sounds: Normal breath sounds  No wheezing or rales  Abdominal:      General: Bowel sounds are normal  There is no distension  Palpations: Abdomen is soft  Tenderness: There is no abdominal tenderness  There is no guarding  Musculoskeletal:         General: Normal range of motion  Cervical back: Normal range of motion and neck supple  Lymphadenopathy:      Cervical: No cervical adenopathy  Skin:     General: Skin is warm and dry  Capillary Refill: Capillary refill takes less than 2 seconds  Findings: No erythema or rash  Neurological:      Mental Status: She is alert and oriented to person, place, and time  Cranial Nerves: No cranial nerve deficit  Psychiatric:         Behavior: Behavior normal          Thought Content:  Thought content normal          Judgment: Judgment normal           DO ANN [facility]

## 2023-06-13 NOTE — PROGRESS NOTES
Assessment/Plan:   1  Chronic tension-type headache, not intractable  Symptoms appear very well controlled today  Continue with her current treatment of Topamax as well as her Fioricet  PDMP reviewed, no abnormality seen today  2  Left-sided thoracic back pain, unspecified chronicity  Reviewed patient's symptoms today  At the time, it is unclear as to exact cause of her left-sided thoracic back pain  Symptoms may likely be rib related  She denies any recurrent symptoms  If she does have any minimal symptoms, she may take Flexeril for further symptom relief  - cyclobenzaprine (FLEXERIL) 10 mg tablet; Take 1 tablet (10 mg total) by mouth 3 (three) times a day as needed for muscle spasms  Dispense: 60 tablet; Refill: 0    3  Anxiety disorder, unspecified type/Moderately severe [facility]  Follow with psychiatry regularly  Continue with her current treatment of sertraline as well as her Wellbutrin  Diagnoses and all orders for this visit:    Annual physical exam    Chronic tension-type headache, not intractable    Left-sided thoracic back pain, unspecified chronicity  -     cyclobenzaprine (FLEXERIL) 10 mg tablet; Take 1 tablet (10 mg total) by mouth 3 (three) times a day as needed for muscle spasms    Anxiety disorder, unspecified type    BMI 25 0-25 9,adult    Intractable migraine with aura with status migrainosus  -     ondansetron (ZOFRAN) 4 mg tablet; Take 1 tablet (4 mg total) by mouth every 8 (eight) hours as needed for nausea or vomiting    Moderately severe recurrent major depression ([facility])    Other orders  -     buPROPion (WELLBUTRIN XL) 300 mg 24 hr tablet  -     sertraline (ZOLOFT) 50 mg tablet          Subjective:       Chief Complaint   Patient presents with   • Physical Exam      Patient ID: Valdez Bliss is a 27 y o  female presents today for a follow-up on her chronic's  She has chronic migraine headaches, anxiety with depression    Has been taking medications regularly  She denies adverse reactions with medications  She was seen over the past few months secondary to left-sided thoracic back pain  She did have an extensive evaluation in the ED  All of her testing was negative  She states that her symptoms did suddenly resolve  She has been having mild exacerbations periodically    HPI    Review of Systems   Constitutional: Negative for activity change, chills, fatigue and fever  HENT: Negative for congestion, ear pain, sinus pressure and sore throat  Eyes: Negative for redness, itching and visual disturbance  Respiratory: Negative for cough and shortness of breath  Cardiovascular: Negative for chest pain and palpitations  Gastrointestinal: Negative for abdominal pain, diarrhea and nausea  Endocrine: Negative for cold intolerance and heat intolerance  Genitourinary: Negative for dysuria, flank pain and frequency  Musculoskeletal: Negative for arthralgias, back pain, gait problem and myalgias  Skin: Negative for color change  Allergic/Immunologic: Negative for environmental allergies  Neurological: Negative for dizziness, numbness and headaches  Psychiatric/Behavioral: Negative for behavioral problems and sleep disturbance  The following portions of the patient's history were reviewed and updated as appropriate : past family history, past medical history, past social history and past surgical history      Current Outpatient Medications:   •  buPROPion (WELLBUTRIN XL) 300 mg 24 hr tablet, , Disp: , Rfl:   •  butalbital-acetaminophen-caffeine-codeine (FIORICET WITH CODEINE) -13-30 MG per capsule, Take 1 capsule by mouth every 6 (six) hours as needed for headaches, Disp: 15 capsule, Rfl: 0  •  cholecalciferol (VITAMIN D3) 1,000 units tablet, TAKE 1 TABLET BY MOUTH EVERY DAY, Disp: 90 tablet, Rfl: 1  •  cyclobenzaprine (FLEXERIL) 10 mg tablet, Take 1 tablet (10 mg total) by mouth 3 (three) times a day as needed for muscle "spasms, Disp: 60 tablet, Rfl: 0  •  hydrOXYzine HCL (ATARAX) 25 mg tablet, Take 1 tablet (25 mg total) by mouth daily at bedtime as needed for anxiety (insomnia), Disp: 30 tablet, Rfl: 1  •  ondansetron (ZOFRAN) 4 mg tablet, Take 1 tablet (4 mg total) by mouth every 8 (eight) hours as needed for nausea or vomiting, Disp: 20 tablet, Rfl: 0  •  sertraline (ZOLOFT) 50 mg tablet, , Disp: , Rfl:   •  topiramate (TOPAMAX) 50 MG tablet, TAKE 1 TABLET BY MOUTH TWICE A DAY, Disp: 180 tablet, Rfl: 0  •  lidocaine (Lidoderm) 5 %, Apply 1 patch topically over 12 hours daily Remove & Discard patch within 12 hours or as directed by MD, Disp: 15 patch, Rfl: 0  •  Lidocaine Viscous HCl (XYLOCAINE) 2 % mucosal solution, Mix 3 tsp (15 ml) in glass of water and gargle and spit every 6 hours as needed for throat pain  Avoid swallowing if possible , Disp: 100 mL, Rfl: 0         Objective:         Vitals:    06/13/23 1432   BP: 110/74   BP Location: Left arm   Patient Position: Sitting   Cuff Size: Adult   Pulse: 96   Temp: 97 6 °F (36 4 °C)   SpO2: 98%   Weight: 62 2 kg (137 lb 3 2 oz)   Height: 5' 1\" (1 549 m)     Physical Exam  Vitals reviewed  Constitutional:       Appearance: She is well-developed  HENT:      Head: Normocephalic and atraumatic  Nose: Nose normal       Mouth/Throat:      Pharynx: No oropharyngeal exudate  Eyes:      General: No scleral icterus  Right eye: No discharge  Left eye: No discharge  Pupils: Pupils are equal, round, and reactive to light  Neck:      Trachea: No tracheal deviation  Cardiovascular:      Rate and Rhythm: Normal rate and regular rhythm  Pulses:           Dorsalis pedis pulses are 2+ on the right side and 2+ on the left side  Posterior tibial pulses are 2+ on the right side and 2+ on the left side  Heart sounds: Normal heart sounds  No murmur heard  No friction rub  No gallop     Pulmonary:      Effort: Pulmonary effort is normal  No " respiratory distress  Breath sounds: Normal breath sounds  No wheezing or rales  Abdominal:      General: Bowel sounds are normal  There is no distension  Palpations: Abdomen is soft  Tenderness: There is no abdominal tenderness  There is no guarding or rebound  Musculoskeletal:         General: Normal range of motion  Cervical back: Normal range of motion and neck supple  Lymphadenopathy:      Head:      Right side of head: No submental or submandibular adenopathy  Left side of head: No submental or submandibular adenopathy  Cervical: No cervical adenopathy  Right cervical: No superficial, deep or posterior cervical adenopathy  Left cervical: No superficial, deep or posterior cervical adenopathy  Skin:     General: Skin is warm and dry  Findings: No erythema  Neurological:      Mental Status: She is alert and oriented to person, place, and time  Cranial Nerves: No cranial nerve deficit  Sensory: No sensory deficit  Psychiatric:         Mood and Affect: Mood is not anxious or depressed  Speech: Speech normal          Behavior: Behavior normal          Thought Content:  Thought content normal          Judgment: Judgment normal

## 2023-06-16 ENCOUNTER — TELEPHONE (OUTPATIENT)
Dept: FAMILY MEDICINE CLINIC | Facility: CLINIC | Age: 33
End: 2023-06-16

## 2023-06-16 NOTE — TELEPHONE ENCOUNTER
----- Message from Juma Cobos DO sent at 6/15/2023  9:41 PM EDT -----  Regarding: FW: Weight loss medication  Contact: [phone]  Please call patient, please advise her that treatment with this medication can only last 3 months  If we are to prescribe this medication, I will need to see her again and check a EKG to make sure her heart does not show any abnormalities  Please schedule her if she would like to proceed with this  Again please reinforce that treatment with this medication can only last 3 months   ----- Message -----  From: Taco Goode MA  Sent: 6/15/2023   8:42 AM EDT  To: Amadeo Sadler DO  Subject: FW: Weight loss medication                         ----- Message -----  From: Lawanna Mohs  Sent: 6/14/2023   5:27 PM EDT  To: [facility]  Subject: Weight loss medication                           Hi Dr Bandar Riggs,    I forgot to mention/talk to you about weight loss medication when I had my physical on Tuesday  I have previously been prescribed Phentermine (weight loss doctor) and lost about 25 lbs  I was wondering if you have prescribed weight loss medication before, and if I would be a fit for it, especially since I have been rapidly gaining weight since being on this new medication (sertraline)       Thank you,  Prasanth Mehta

## 2023-06-20 ENCOUNTER — TELEPHONE (OUTPATIENT)
Dept: FAMILY MEDICINE CLINIC | Facility: CLINIC | Age: 33
End: 2023-06-20

## 2023-07-17 NOTE — BH TREATMENT PLAN
TREATMENT PLAN (Medication Management Only)        [address]    Name and Date of Birth:  Sin Peterson 27 y.o. 1990  Date of Treatment Plan: July 18, 2023  Diagnosis/Diagnoses:    1. Moderate episode of recurrent major depressive disorder ([address])    2. Anxiety disorder, unspecified type    3. Insomnia, unspecified type    4. Attention and concentration deficit    5. Vitamin D deficiency      Strengths/Personal Resources for Self-Care: supportive family, supportive friends, taking medications as prescribed, ability to listen, ability to reason, average or above intelligence, financial means, good physical health, self-reliance, willingness to work on problems, work skills. Area/Areas of need (in own words): anxiety symptoms, depressive symptoms, sleep disruption, irritability, concentration  1. Long Term Goal: improve control of anxiety and irritability  Target Date:6 months - 1/18/2024  Person/Persons responsible for completion of goal: Lisa/ kong  2. Short Term Objective (s) - How will we reach this goal?:   A. Provider new recommended medication/dosage changes and/or continue medication(s): continue current medications as prescribed Zoloft, Wellbutrin XL, Trazodone. B. Attend medication management appointments regularly. C. Referral for psychotherapy. Target Date:6 months - 1/18/2024  Person/Persons Responsible for Completion of Goal: Lisa/self    Progress Towards Goals: improving gradually  Treatment Modality: medication management every 1 months, referral for individual psychotherapy, referral for neuropsychological assessment  Review due 180 days from date of this plan: 6 months - 1/18/2024  Expected length of service: ongoing treatment     My Physician and I have developed this plan together and I agree to work on the goals and objectives. I understand the treatment goals that were developed for my treatment.     Steven Melissa, DO

## 2023-07-17 NOTE — PSYCH
Psychiatric Evaluation - Behavioral Health   MRN: [id number]    Assessment/Plan   Bam Ugarte is a 27 y.o. 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 Patient followed with Ravindra Bhandari, Dr. Bernardo Glaser, since 08/2021. Patient did not have consistent follow up after 07/2022 and reports she transitioned to a virtual psychiatric provider, [facility], at that time. The patient is re-establishing care with [facility] at this time. Patient endorses increased anxiety symptoms, and difficulty with sleep. Overall, patient feels depressive symptoms remain well managed. Patient in agreement for referral for psychotherapy, neuropsychology for further diagnostic testing, and medication adjustments which include increasing Zoloft. Patient will remain on current doses of Wellbutrin and Trazodone. 1. Moderate episode of recurrent major depressive disorder, in partial remission ([address])  2. Anxiety disorder, unspecified, differential includes Generalized anxiety disorder, Panic disorder, Post traumatic stress disorder  3. Insomnia, unspecified  4. Attention and concentration deficits  5.  Vitamin D deficiency     Treatment Recommendations/Precautions:  • Increase Zoloft to 75 mg for 14 days, then increase to 100 mg daily for mood and anxiety  o Risk, benefits, adverse effects reviewed including serotonin syndrome, SIADH, worsening depression, suicidality, induction of zachary, GI upset, headaches, activation, sexual side effects, sedation, potential drug interactions, and others. • Continue Wellbutrin  mg daily for mood and anxiety  Risk, benefits, adverse effects reviewed  including induction of zachary, decreased seizure threshold and risk with alcohol or electrolyte disturbances, headaches, hypertension and cardiovascular effects, GI distress, weight loss, agitation/activation, dizziness, tremor, anxiety, potential for drug interactions, and others. • Continue Trazodone 50 mg QHS PRN for sleep  o Risk, benefits, adverse effects reviewed  including dizziness, headache, GI distress, sedation, confusion, priapism, suicidal thoughts, serotonin syndrome, drug interactions, induction of zachary and others. • Patient is prescribed Topiramate 50 mg twice daily by neurology for migraine, may be augmenting for mood   • Prescription for Vitamin D level ordered given patient's history of Vitamin D deficiency and depressive symptoms  • Medication management follow-up in 4 weeks  • Referral for individual psychotherapy; patient previously referred for [facility] psychotherapy, message to intake sent to confirm if patient is on wait list or requires updated status  • Aware of need to follow up with family physician for medical issues  • Aware of 24 hour and weekend coverage for urgent situations accessed by calling [facility] main practice number  • Referral for neuropsychological assessment   • Information regarding CBT-I provided and patient provided with information on rossy based platforms for CBT-I, such as [employer]    Medications Risks/Benefits:      Risks, Benefits And Possible Side Effects Of Medications:    Risks, benefits, and possible side effects of medications explained to [facility] and she verbalizes understanding and agreement for treatment.     Controlled Medication Discussion:     Not applicable - controlled prescriptions are not prescribed by this practice    Treatment Plan:  The Treatment Plan was completed but not signed due to social distancing. Verbal consent was provided by the patient/caregiver during the visit. . If done today, the next plan will be due January 18th, 2024.   ------------------------------------------------------------  Chief Complaint: "I'm stressed."    History of Present Illness     Escamilla Grief is a 27 y.o. 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 Patient followed with Dr. Berenice Georges, since 08/2021. Patient did not have consistent follow up after 07/2022 and reports she transitioned to a virtual psychiatric provider, [facility], at that time. Patient endorses her mood is  "stressed," and she endorses increase in anxiety. She reports she does not worry about specific things, she thinks about there enough time in the day to get everything done and replays everything at the end of the day. She endorses racing thoughts. Patient endorses difficulty with sleep, she reports she can stare at the wall for 3-4 hours before falling asleep. The patient demonstrates good sleep hygiene, stops caffeine 3-4 pm, stops phone or TV use before bed, has a leep sound machine, and uses essential oils - lavender, and still finds it difficult to fall asleep.  She reports her mind is "always racing, I can never shut my mind down."  Patient endorses trauma related symptoms of vivid dreams that are distressing, hypervigilance, and denies flashbacks, or exaggerated startle response. When asked about avoidance, patient discussed never going to her father's [place] to avoid the emotions related. She endorses a history of panic attacks during which time she feels like she cannot breath, experiences crying, feels the anxiety physically. Reports they occur 1x/week recently, when she gets overwhelmed, last 5-10 minutes. Patient denies disordered eating history including restrictive, binging, or purging behaviors. The patient reports history of depressed mood and states she has "never been so depressed that I can't function and can't get things done." She endorses her appetite "comes and goes," and that she will not feel hungry at times. The patient reports her energy is good and that her motivation "fluctuates."   The patient reports her concentration feels decreased, when her attention is pulled she has difficulty remembering to complete a task, starts and does not complete multiple tasks, has difficulty organizing, and is forgetful at times. She endorses history of being restless and fidgety at school. She recalls being evaluated for ADHD in school and her mother not wanting her to start medications at that time. The patient denies suicidal ideation, passive death wishes, or homicidal ideation at time of evaluation. Patient notes she will get irritable at times, and feels "I'm too much, or not enough." She denies a history of or current manic episodes including decreased need for sleep, elevated mood or energy, grandiosity, impulsivity, increased goal directed behaviors, or rapid speech or thoughts. The patient does not demonstrate symptoms consistent with zachary/hypomania at time of evaluation. The patient denies a history of or current auditory or visual hallucinations. She denies paranoid ideation and does not demonstrate symptoms consistent with negative symptoms of psychosis at time of evaluation.     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 Feels she tries to cope with compartmentalizing, notably her grief, discussing passing of her father 9 years ago and of her best friend who was supposed to be best man passed away 6 months ago in motorcycle accident   --------------------------------------  Psychiatric Review Of Systems:  • Adela Maravilla reports Symptoms as described in HPI. • Adela Maravilla denies Current suicidal thoughts, plan, or intent, Current thoughts of self-harm or Current homicidal thoughts, plan, or intent. Medical Review Of Systems:  Complete review of systems is negative except as noted above.     Visit Vitals  OB Status Birth Control   Smoking Status Former      [employer] Readings from Last 6 Encounters:   06/13/23 62.2 kg (137 lb 3.2 oz)   12/23/22 61.7 kg (136 lb)   07/26/22 68.2 kg (150 lb 6.4 oz)   01/26/22 69.4 kg (153 lb)   12/23/21 70.5 kg (155 lb 6.4 oz)   08/23/21 66.8 kg (147 lb 3.2 oz)      Mental Status Evaluation:    Appearance age appropriate, casually dressed, dressed appropriately, looks stated age, tattooed   Behavior cooperative, calm, sitting comfortably   Speech normal rate, normal volume, normal pitch, clear, coherent   Mood "stressed"   Affect constricted, tears welling at times   Thought Processes organized, logical, coherent, goal directed   Associations intact associations   Thought Content no overt delusions, negative thoughts, ruminating thoughts   Perceptual Disturbances: Denies auditory or visual hallucinations and Does not appear to be responding to internal stimuli   Abnormal Thoughts  Risk Potential Denies suicidal or homicidal ideation, plan, or intent   Orientation oriented to person, place, time/date and situation   Memory recent and remote memory grossly intact   Consciousness alert and awake   Attention Span Concentration Span attention span and concentration are age appropriate   Intellect appears to be of average intelligence   Insight intact   Judgement intact   Muscle Strength and  Gait normal muscle strength and normal muscle tone, normal gait and normal balance   Motor Activity no abnormal movements   Language appears grossly intact   Fund of Knowledge adequate knowledge of current events  adequate fund of knowledge regarding vocabulary      Psychiatric History:   Prior psychiatric diagnoses: MDD, Anxiety  Inpatient hospitalizations: patient denies  Suicide attempts/self-harm: patient denies  Violent/aggressive behavior: patient denies  Outpatient psychiatric providers: patient previously in the interim, patient has been seeing a virtual psychiatrist - [facility], prior to this was following with primary care provider  Past/current psychotherapy: patient denies current psychotherapy, patient previously in therapy 4 years ago   Other Services: patient denies  Psychiatric medication trial:   • Multiple including  • Antidepressants  o Paroxetine (Paxil, Citalopram (Celexa), Duloxetine (Cymbalta), Sertraline (Zoloft), Wellbutrin, Trazodone  • Mood stabilizers  o Lamotrigine (Lamictal), Topiramate (Topamax) - migraine  • Sedative hypnotics  o Xanax, Ativan  • Others  o BuSpar, Atarax    Substance Abuse History:  I have assessed this patient for substance use within the past 12 months. Patient denies use of tobacco, alcohol, or illicit drugs. Patient did smoke cigarettes - less than 1 ppd previously. Denies past legal actions or arrests secondary to substance intoxication. The patient denies prior DWIs/DUIs. Melissa Boone does not exhibit objective evidence of substance withdrawal during today's examination nor does Lisa appear under the influence of any psychoactive substance. Family Psychiatric History:    Mother: depression, anxiety  Sister: depression, anxiety, OCD    Social History  Early life/developmental: Denies a history of milestone/developmental delay. Denies a history of in-utero exposure to toxins/illicit substances. Marital history: /Civil Union ,  in April Nisa Flores)  Children: 1 stepdaughter, 9 y/o Russell)  Living arrangement: Lives in a home with wife and stepdaughter, 2 Slovenian shepherds and 1 cat. 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 • butalbital-acetaminophen-caffeine-codeine (FIORICET WITH CODEINE) -26-30 MG per capsule 1 capsule, Oral, Every 6 hours PRN   • cholecalciferol (VITAMIN D3) 1,000 units tablet TAKE 1 TABLET BY MOUTH EVERY DAY   • cyclobenzaprine (FLEXERIL) 10 mg, Oral, 3 times daily PRN   • hydrOXYzine HCL (ATARAX) 25 mg, Oral, Daily at bedtime PRN   • lidocaine (Lidoderm) 5 % 1 patch, Topical, Daily, Remove & Discard patch within 12 hours or as directed by MD   • Lidocaine Viscous HCl (XYLOCAINE) 2 % mucosal solution Mix 3 tsp (15 ml) in glass of water and gargle and spit every 6 hours as needed for throat pain. Avoid swallowing if possible. • ondansetron (ZOFRAN) 4 mg, Oral, Every 8 hours PRN   • sertraline (ZOLOFT) 50 mg tablet No dose, route, or frequency recorded.    • topiramate (TOPAMAX) 50 MG tablet TAKE 1 TABLET BY MOUTH TWICE A DAY      Past Medical History:   Diagnosis Date   • Anxiety       Past Surgical History:   Procedure Laterality Date   • WISDOM TOOTH EXTRACTION       Family History   Problem Relation Age of Onset   • No Known Problems Mother    • No Known Problems Sister    • Melanoma Other         Unspecifed Grandmother   • Liver cancer Maternal Grandmother    • No Known Problems Paternal Grandmother    • No Known Problems Paternal Grandfather    • No Known Problems Maternal Aunt    • Substance Abuse Neg Hx         Mother/Father   • Mental illness Neg Hx         Mother/Father       The following portions of the patient's history were reviewed and updated as appropriate: allergies, current medications, past family history, past medical history, past social history, past surgical history and problem list.  Labs & Imaging:  I have personally reviewed all pertinent laboratory tests and imaging results.    Admission on 03/26/2023, Discharged on 03/26/2023   Component Date Value Ref Range Status   • WBC 03/26/2023 11.67 (H)  4.31 - 10.16 Thousand/uL Final   • RBC 03/26/2023 3.90  3.81 - 5.12 Million/uL Final   • Hemoglobin 03/26/2023 12.4  11.5 - 15.4 g/dL Final   • Hematocrit 03/26/2023 38.0  34.8 - 46.1 % Final   • MCV 03/26/2023 97  82 - 98 fL Final   • MCH 03/26/2023 31.8  26.8 - 34.3 pg Final   • MCHC 03/26/2023 32.6  31.4 - 37.4 g/dL Final   • RDW 03/26/2023 12.3  11.6 - 15.1 % Final   • MPV 03/26/2023 9.6  8.9 - 12.7 fL Final   • Platelets 09/14/3613 313  149 - 390 Thousands/uL Final   • nRBC 03/26/2023 0  /100 WBCs Final   • Neutrophils Relative 03/26/2023 62  43 - 75 % Final   • Immat GRANS % 03/26/2023 0  0 - 2 % Final   • Lymphocytes Relative 03/26/2023 30  14 - 44 % Final   • Monocytes Relative 03/26/2023 7  4 - 12 % Final   • Eosinophils Relative 03/26/2023 1  0 - 6 % Final   • Basophils Relative 03/26/2023 0  0 - 1 % Final   • Neutrophils Absolute 03/26/2023 7.21  1.85 - 7.62 Thousands/µL Final   • Immature Grans Absolute 03/26/2023 0.03  0.00 - 0.20 Thousand/uL Final   • Lymphocytes Absolute 03/26/2023 3.50  0.60 - 4.47 Thousands/µL Final   • Monocytes Absolute 03/26/2023 0.79  0.17 - 1.22 Thousand/µL Final   • Eosinophils Absolute 03/26/2023 0.09  0.00 - 0.61 Thousand/µL Final   • Basophils Absolute 03/26/2023 0.05  0.00 - 0.10 Thousands/µL Final   • Sodium 03/26/2023 141  135 - 147 mmol/L Final   • Potassium 03/26/2023 4.0  3.5 - 5.3 mmol/L Final   • Chloride 03/26/2023 111 (H)  96 - 108 mmol/L Final   • CO2 03/26/2023 23  21 - 32 mmol/L Final   • ANION GAP 03/26/2023 7  4 - 13 mmol/L Final   • BUN 03/26/2023 14  5 - 25 mg/dL Final   • Creatinine 03/26/2023 0.83  0.60 - 1.30 mg/dL Final    Standardized to IDMS reference method   • Glucose 03/26/2023 91  65 - 140 mg/dL Final    If the patient is fasting, the ADA then defines impaired fasting glucose as > 100 mg/dL and diabetes as > or equal to 123 mg/dL. • Calcium 03/26/2023 9.3  8.4 - 10.2 mg/dL Final   • eGFR 03/26/2023 92  ml/min/1.73sq m Final   • D-Dimer, Quant 03/26/2023 0.30  <0.50 ug/ml FEU Final    Reference and upper limits to exclude DVT and PE are the same. Do not use to exclude if clinical symptoms are present. Pregnant women:  1st trimester:  <0.22 - 1.06 ug/ml FEU  2nd trimester:  <0.22 - 1.88 ug/ml FEU  3rd trimester:   0.24 - 3.28 ug/ml FEU    Note: Normal ranges may not apply to patients who are transgender, non-binary, or whose legal sex, sex at birth, and gender identity differ.        Suicide/Homicide Risk Assessment:    Risk of Harm to Self:  The following ratings are based on assessment at the time of the interview  Demographic risk factors include:   Historical Risk Factors include: chronic depressive symptoms, chronic anxiety symptoms, history of traumatic experiences  Recent Specific Risk Factors include: diagnosis of depression, current anxiety symptoms, worries about finances or work  Protective Factors: no current suicidal ideation, access to mental health treatment, being a parent, being , compliant with mental health treatment, having a desire to be alive, opportunities to participate in community, resiliency, responsibilities and duties to others, stable living environment, stable job, supportive family, supportive friends  Weapons: gun and handgun. The following steps have been taken to ensure weapons are properly secured: locked, secured, ammo stored separately   Based on today's assessment, Sandra Alexandra presents the following risk of harm to self: low    Risk of Harm to Others: The following ratings are based on assessment at the time of the interview  Demographic Risk Factors include: under age 36. Historical Risk Factors include: none. Recent Specific Risk Factors include: concomitant mood disorder, multiple stressors. Protective Factors: no current homicidal ideation, access to mental health treatment, being a parent, being , compliant with mental health treatment, opportunities to participate in community, resilience, responsibilities and duties to others, safe and stable living environment, supportive family, supportive friends  Weapons: gun and handgun. The following steps have been taken to ensure weapons are properly secured: locked, secured, ammo stored separately  Based on today's assessment, Sandra Alexandra presents the following risk of harm to others: low      Medical Decision Making / Counseling / Coordination of Care: The following interventions are recommended: return in 1 month for follow up, refer for individual therapy and refer for neuropsychological testing. Although patient's acute lethality risk is LOW, long-term/chronic lethality risk is mildly elevated given the risk factors listed above. However, at the current moment, Sandra Alexandra is future-oriented, forward-thinking, and demonstrates ability to act in a self-preserving manner as evidenced by volitionally seeking psychiatric evaluation and treatment today. Saman Mondragon contracts for safety and is not an imminent risk of harm to self or others. Outpatient level of care is deemed appropriate at this current time. Miguel Chavez understands that if they can no longer contract for safety, they need to call the office or report to their nearest Emergency Room for immediate evaluation. At this juncture, inpatient hospitalization is not currently warranted. To mitigate future risk, patient should adhere to treatment recommendations, avoid alcohol/illicit substance use, utilize community-based resources and familiar support, and prioritize mental health treatment. The diagnosis and treatment plan were reviewed with the patient. Risks, benefits, and alternatives to treatment were discussed. The importance of medication and treatment compliance was reviewed with the patient.      This note was not shared with the patient due to reasonable likelihood of causing patient harm     Tomas Edmonds DO   Psychiatry Resident, PGY-III

## 2023-07-18 ENCOUNTER — OFFICE VISIT (OUTPATIENT)
Dept: PSYCHIATRY | Facility: CLINIC | Age: 33
End: 2023-07-18
Payer: COMMERCIAL

## 2023-07-18 DIAGNOSIS — F41.9 ANXIETY DISORDER, UNSPECIFIED TYPE: ICD-10-CM

## 2023-07-18 DIAGNOSIS — F33.1 MODERATE EPISODE OF RECURRENT MAJOR DEPRESSIVE DISORDER (HCC): Primary | ICD-10-CM

## 2023-07-18 DIAGNOSIS — E55.9 VITAMIN D DEFICIENCY: ICD-10-CM

## 2023-07-18 DIAGNOSIS — G47.00 INSOMNIA, UNSPECIFIED TYPE: ICD-10-CM

## 2023-07-18 DIAGNOSIS — R41.840 ATTENTION AND CONCENTRATION DEFICIT: ICD-10-CM

## 2023-07-18 PROCEDURE — 90792 PSYCH DIAG EVAL W/MED SRVCS: CPT | Performed by: PSYCHIATRY & NEUROLOGY

## 2023-07-18 RX ORDER — TRAZODONE HYDROCHLORIDE 50 MG/1
50 TABLET ORAL
Qty: 30 TABLET | Refills: 1 | Status: SHIPPED | OUTPATIENT
Start: 2023-07-18 | End: 2023-09-16

## 2023-07-18 RX ORDER — SERTRALINE HYDROCHLORIDE 100 MG/1
100 TABLET, FILM COATED ORAL DAILY
Qty: 30 TABLET | Refills: 1 | Status: SHIPPED | OUTPATIENT
Start: 2023-07-18 | End: 2023-09-16

## 2023-07-20 RX ORDER — ONDANSETRON 4 MG/1
TABLET, FILM COATED ORAL
Qty: 20 TABLET | Refills: 0 | Status: SHIPPED | OUTPATIENT
Start: 2023-07-20

## 2023-07-24 ENCOUNTER — TELEPHONE (OUTPATIENT)
Dept: PSYCHIATRY | Facility: CLINIC | Age: 33
End: 2023-07-24

## 2023-07-26 ENCOUNTER — VBI (OUTPATIENT)
Dept: ADMINISTRATIVE | Facility: OTHER | Age: 33
End: 2023-07-26

## 2023-08-02 DIAGNOSIS — G43.111 INTRACTABLE MIGRAINE WITH AURA WITH STATUS MIGRAINOSUS: ICD-10-CM

## 2023-08-03 RX ORDER — BUTALBITAL, ACETAMINOPHEN, CAFFEINE AND CODEINE PHOSPHATE 50; 325; 40; 30 MG/1; MG/1; MG/1; MG/1
CAPSULE ORAL
Qty: 15 CAPSULE | Refills: 0 | Status: SHIPPED | OUTPATIENT
Start: 2023-08-03

## 2023-08-04 DIAGNOSIS — G43.111 INTRACTABLE MIGRAINE WITH AURA WITH STATUS MIGRAINOSUS: ICD-10-CM

## 2023-08-04 RX ORDER — ONDANSETRON 4 MG/1
4 TABLET, FILM COATED ORAL EVERY 8 HOURS PRN
Qty: 20 TABLET | Refills: 0 | Status: SHIPPED | OUTPATIENT
Start: 2023-08-04

## 2023-08-17 ENCOUNTER — TELEPHONE (OUTPATIENT)
Dept: PSYCHIATRY | Facility: CLINIC | Age: 33
End: 2023-08-17

## 2023-08-17 NOTE — TELEPHONE ENCOUNTER
Reached out to patient in regards to Neuropsychology referral Left VM for pt to contact intake department.

## 2023-08-20 DIAGNOSIS — G44.229 CHRONIC TENSION-TYPE HEADACHE, NOT INTRACTABLE: ICD-10-CM

## 2023-08-20 DIAGNOSIS — G43.111 INTRACTABLE MIGRAINE WITH AURA WITH STATUS MIGRAINOSUS: ICD-10-CM

## 2023-08-21 RX ORDER — TOPIRAMATE 50 MG/1
TABLET, FILM COATED ORAL
Qty: 180 TABLET | Refills: 0 | Status: SHIPPED | OUTPATIENT
Start: 2023-08-21

## 2023-08-28 NOTE — PSYCH
Psychiatric Follow Up Visit - [facility]  MRN: [id number]    Assessment/Plan:   Chris Barrett is a 27 y.o. 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 Patient was seen by this writer for transfer of care in 07/2023. Patient followed with [facility], Dr. Chad Andre, since 08/2021. In 07/2022, patient transitioned to a virtual psychiatric provider, [facility], and then re-established care with [facility] at 07/2023 appointment. Patient endorses depressive symptoms remain stable, with exception of decreased sleep and decreased concentration. Patient has had improvement in anxiety symptoms and panic related symptoms. Patient self tapered off of Wellbutrin since last appointment due to side effect of dizziness. Patient inquires about medical for ADHD related symptoms, provided education and discussed neuropsychology referral for which patient does have an appointment for evaluation/testing in October. Discussed optimization of current medications, including increase in Trazodone to address insomnia, patient agreeable. Diagnoses:  1. Moderate episode of recurrent major depressive disorder, in partial remission ([address])  2. Anxiety disorder, unspecified, differential includes GINACIO, Panic Disorder, PTSD  3. Insomnia, unspecified  4. Attention and concentration deficits  5.  Vitamin D deficiency    Treatment Recommendations/Precautions:  · Increase Trazodone to  mg QHS PRN for sleep  · PARQ completed including dizziness, headache, GI distress, sedation, confusion, priapism, suicidal thoughts, serotonin syndrome, drug interactions, induction of zachary and others. · Continue Zoloft 100 mg daily for mood and anxiety  · PARQ completed including serotonin syndrome, SIADH, worsening depression, suicidality, induction of zachary, GI upset, headaches, activation, sexual side effects, sedation, potential drug interactions, and others. · Patient self tapered off of Wellbutrin  mg daily for mood and anxiety  · Patient was experiencing dizziness and felt it was an adverse effect of Wellbutrin and self tapered to 150 mg (from prior prescriber) and then discontinued. · Patient also taking phentermine for weight loss concurrently, which may have contributed to dizziness. PDMP reviewed and patient last filled phentermine on 08/02/23. · Patient prescribed Topiramate 50 mg QHS by family medicine for migraine, may be augmenting for mood  · Recommend continued Vitamin D3 supplementation, monitor levels  · Previously referred to [facility] lou, patient is on wait list.  · Previously referred to neuropsychology, patient has appointment with [facility] in October. • Medication management every 8 weeks  • Referral for individual psychotherapy  • Aware of need to follow up with family physician for medical issues  • Aware of 24 hour and weekend coverage for urgent situations accessed by calling [facility] main practice number    Medication Risks/Benefits      Risks, Benefits And Possible Side Effects Of Medications:    Risks, benefits, and possible side effects of medications explained to [facility] and she verbalizes understanding and agreement for treatment.     Controlled Medication Discussion:     [facility] has been filling controlled prescriptions on time as prescribed according to [address] [employer] Monitoring Program.  Controlled substances not prescribed by this provider. ------------------------------------  History of Present Illness   Ashtyn Meyers is presenting to follow up for anxiety, depression and insomnia. Keyla Maza endorses endorses increase in Zoloft has been going well, though notes fatigue. Patient states "I feel like I'm always tired," which increased a few weeks ago. Patient feels like she requires daytime naps when able. Patient reports she is unsure if this is a medication side effect, or due to decreased sleep. During this time, the patient also self-tapered off of Wellbutrin XL and has been utilizing phentermine for weight loss. Patient stopped medication due to dizziness which has improved since stopping Wellbutrin. Provided education regarding contacting provider with side effect concerns and to update medication lists. Patient reports she is averaging 4-5 hours of sleep, and does not get restful sleep. Patient continues with good sleep hygiene including stopping caffeine use midday, stopping TV and phone use before bed, and taking Trazodone 50 mg PRN 20-30 minutes before attempting sleep. She notes she will bein bed awake until 2:30am.    Patient notes anxiety is "better," since increase in Zoloft. She reports a few panic attacks, which were work and stress related and "everything all together." Patient reports the panic attacks occurred every 2 weeks, which is an improvement from prior weekly frequency. Patient endorses the vivid dreams are "the same," "everyone once in a while I have a nightmare." Often these dreams are based on possible events at work or home, but not things that have happened in the past. Patient endorses she is still not falling asleep well, does not feel rested in the morning as noted above. Patient denies down, depressed mood. She endorses decreased appetite in setting of phentermine prescription. Patient does not endorse feelings of hopeless or helplessness.  Patient notes decreased energy levels, does not report change in motivation. Patient denies suicidal ideation, passive death wishes, or homicidal ideations. Patient endorses continued decreased concentration with difficulty completing tasks and focusing at work. Patient reports she feels "off," from decreased concentration and decreased sleep. Patient endorses ritability is "better," gives example of more patience, patient will notice when she does become overwhelmed she has tearful episodes and begins yelling. The patient does not demonstrate manic related symptoms including decreased need for sleep, increased energy and mood, irritability, distractibility, increased goal directed behaviors, rapid speech or thoughts, grandiosity, and impulsivity at time of evaluation. The patient does not endorse auditory or visual hallucinations, paranoid ideations, referential thinking, and does not demonstrate symptoms consistent with negative symptoms of psychosis at time of evaluation. Patient reports neuropsych evaluation scheduled for October with [facility]. Patient remains on wait list for therapy through [facility] and reached out to a few providers locally. Psychiatric Review Of Systems:  • Jearlean Merlin reports Symptoms as described in HPI. • Jearlean Merlin denies Current suicidal thoughts, plan, or intent, Current thoughts of self-harm or Current homicidal thoughts, plan, or intent. Medical Review Of Systems:  Complete review of systems is negative except as noted above.    ------------------------------------  All italicized information has been copied from previous psychiatric evaluation. Information has been reviewed with the patient.      Past Psychiatric History:   Prior psychiatric diagnoses: MDD, Anxiety  Inpatient hospitalizations: patient denies  Suicide attempts/self-harm: patient denies  Violent/aggressive behavior: patient denies  Outpatient psychiatric providers: patient previously in the interim, patient has been seeing a virtual psychiatrist - [facility], prior to this was following with primary care provider  Past/current psychotherapy: patient denies current psychotherapy, patient previously in therapy 4 years ago   Other Services: patient denies  Psychiatric medication trial:   • Multiple including  • Antidepressants  ? Paroxetine (Paxil, Citalopram (Celexa), Duloxetine (Cymbalta), Sertraline (Zoloft), Wellbutrin, Trazodone  • Mood stabilizers  ? Lamotrigine (Lamictal), Topiramate (Topamax) - migraine  • Sedative hypnotics  ? Xanax, Ativan  • Others  ? BuSpar, Atarax     Substance Abuse History:  I have assessed this patient for substance use within the past 12 months. Patient denies use of tobacco, alcohol, or illicit drugs. Patient did smoke cigarettes - less than 1 ppd previously. Denies past legal actions or arrests secondary to substance intoxication. The patient denies prior DWIs/DUIs. Niurka Millan does not exhibit objective evidence of substance withdrawal during today's examination nor does Lisa appear under the influence of any psychoactive substance. Family Psychiatric History: Mother: depression, anxiety  Sister: depression, anxiety, OCD     Social History  Early life/developmental: Denies a history of milestone/developmental delay. Denies a history of in-utero exposure to toxins/illicit substances. Marital history: /Civil Union ,  in April [facility])  Children: 1 stepdaughter, 7 y/o Russell)  Living arrangement: Lives in a home with wife and stepdaughter, 2 Brazilian shepherds and 1 cat.   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 and legal history  Access to firearms: handgun and rifle, locked and secured in safe with jose stored separately      Traumatic History:   Abuse: none reported  Other Traumatic Events: work related with fighting and aggressive behaviors , father passed away 9 years ago     History Review: The following portions of the patient's history were reviewed and updated as appropriate: allergies, current medications, past family history, past medical history, past social history, past surgical history and problem list.    Visit Vitals  OB Status Unknown   Smoking Status Former      [employer] Readings from Last 6 Encounters:   06/13/23 62.2 kg (137 lb 3.2 oz)   12/23/22 61.7 kg (136 lb)   07/26/22 68.2 kg (150 lb 6.4 oz)   01/26/22 69.4 kg (153 lb)   12/23/21 70.5 kg (155 lb 6.4 oz)   08/23/21 66.8 kg (147 lb 3.2 oz)        Rating Scales  PHQ-2/9 Depression Screening             Mental Status Exam:  Appearance:  alert, good eye contact, appears stated age, casually dressed, appropriate grooming and hygiene and tattooed   Behavior:  calm, cooperative and sitting comfortably   Motor: no abnormal movements and normal gait and balance   Speech:  spontaneous, clear, not pressured and coherent   Mood:  "tired"   Affect:  constricted   Thought Process:  Organized, logical, goal-directed   Thought Content: no verbalized delusions or overt paranoia   Perceptual disturbances: no reported hallucinations and does not appear to be responding to internal stimuli at this time   Risk Potential: No active or passive suicidal or homicidal ideation was verbalized during interview   Cognition: oriented to person, place, time, and situation, memory grossly intact, appears to be of average intelligence and age-appropriate attention span and concentration   Insight:  Fair   Judgment: Fair       Meds/Allergies    No Known Allergies  Current Outpatient Medications   Medication Instructions   • buPROPion (WELLBUTRIN XL) 300 mg 24 hr tablet No dose, route, or frequency recorded.    • butalbital-acetaminophen-caffeine-codeine (FIORICET WITH CODEINE) -80-30 MG per capsule TAKE 1 CAPSULE BY MOUTH EVERY 6 HOURS AS NEEDED FOR HEADACHES. • cholecalciferol (VITAMIN D3) 1,000 units tablet TAKE 1 TABLET BY MOUTH EVERY DAY   • cyclobenzaprine (FLEXERIL) 10 mg, Oral, 3 times daily PRN   • lidocaine (Lidoderm) 5 % 1 patch, Topical, Daily, Remove & Discard patch within 12 hours or as directed by MD   • Lidocaine Viscous HCl (XYLOCAINE) 2 % mucosal solution Mix 3 tsp (15 ml) in glass of water and gargle and spit every 6 hours as needed for throat pain. Avoid swallowing if possible. • ondansetron (ZOFRAN) 4 mg, Oral, Every 8 hours PRN   • sertraline (ZOLOFT) 100 mg, Oral, Daily, Start 08/01/23. • topiramate (TOPAMAX) 50 MG tablet TAKE 1 TABLET BY MOUTH TWICE A DAY   • traZODone (DESYREL) 50 mg, Oral, Daily at bedtime PRN        Labs & Imaging:  I have personally reviewed all pertinent laboratory tests and imaging results. No visits with results within 2 Month(s) from this visit.    Latest known visit with results is:   Admission on 03/26/2023, Discharged on 03/26/2023   Component Date Value Ref Range Status   • WBC 03/26/2023 11.67 (H)  4.31 - 10.16 Thousand/uL Final   • RBC 03/26/2023 3.90  3.81 - 5.12 Million/uL Final   • Hemoglobin 03/26/2023 12.4  11.5 - 15.4 g/dL Final   • Hematocrit 03/26/2023 38.0  34.8 - 46.1 % Final   • MCV 03/26/2023 97  82 - 98 fL Final   • MCH 03/26/2023 31.8  26.8 - 34.3 pg Final   • MCHC 03/26/2023 32.6  31.4 - 37.4 g/dL Final   • RDW 03/26/2023 12.3  11.6 - 15.1 % Final   • MPV 03/26/2023 9.6  8.9 - 12.7 fL Final   • Platelets 28/82/5566 313  149 - 390 Thousands/uL Final   • nRBC 03/26/2023 0  /100 WBCs Final   • Neutrophils Relative 03/26/2023 62  43 - 75 % Final   • Immat GRANS % 03/26/2023 0  0 - 2 % Final   • Lymphocytes Relative 03/26/2023 30  14 - 44 % Final   • Monocytes Relative 03/26/2023 7  4 - 12 % Final   • Eosinophils Relative 03/26/2023 1  0 - 6 % Final   • Basophils Relative 03/26/2023 0  0 - 1 % Final   • Neutrophils Absolute 03/26/2023 7.21  1.85 - 7.62 Thousands/µL Final   • Immature Grans Absolute 03/26/2023 0.03  0.00 - 0.20 Thousand/uL Final   • Lymphocytes Absolute 03/26/2023 3.50  0.60 - 4.47 Thousands/µL Final   • Monocytes Absolute 03/26/2023 0.79  0.17 - 1.22 Thousand/µL Final   • Eosinophils Absolute 03/26/2023 0.09  0.00 - 0.61 Thousand/µL Final   • Basophils Absolute 03/26/2023 0.05  0.00 - 0.10 Thousands/µL Final   • Sodium 03/26/2023 141  135 - 147 mmol/L Final   • Potassium 03/26/2023 4.0  3.5 - 5.3 mmol/L Final   • Chloride 03/26/2023 111 (H)  96 - 108 mmol/L Final   • CO2 03/26/2023 23  21 - 32 mmol/L Final   • ANION GAP 03/26/2023 7  4 - 13 mmol/L Final   • BUN 03/26/2023 14  5 - 25 mg/dL Final   • Creatinine 03/26/2023 0.83  0.60 - 1.30 mg/dL Final    Standardized to IDMS reference method   • Glucose 03/26/2023 91  65 - 140 mg/dL Final    If the patient is fasting, the ADA then defines impaired fasting glucose as > 100 mg/dL and diabetes as > or equal to 123 mg/dL. • Calcium 03/26/2023 9.3  8.4 - 10.2 mg/dL Final   • eGFR 03/26/2023 92  ml/min/1.73sq m Final   • D-Dimer, Quant 03/26/2023 0.30  <0.50 ug/ml FEU Final    Reference and upper limits to exclude DVT and PE are the same. Do not use to exclude if clinical symptoms are present. Pregnant women:  1st trimester:  <0.22 - 1.06 ug/ml FEU  2nd trimester:  <0.22 - 1.88 ug/ml FEU  3rd trimester:   0.24 - 3.28 ug/ml FEU    Note: Normal ranges may not apply to patients who are transgender, non-binary, or whose legal sex, sex at birth, and gender identity differ. ---------------------------------------    Although patient's acute lethality risk is low, long-term/chronic lethality risk is mildly elevated in the presence of anxiety, insomnia, work and home stressors, history of traumatic experiences.  At the current moment, Cassidy Andrew is future-oriented, forward-thinking, and demonstrates ability to act in a self-preserving manner as evidenced by volitionally presenting to the clinic today, seeking treatment. At this juncture, inpatient hospitalization is not currently warranted. To mitigate future risk, patient should adhere to the recommendations of this writer, avoid alcohol/illicit substance use, utilize community-based resources and familiar support and prioritize mental health treatment. Based on today's assessment and clinical criteria, [facility] Rose contracts for safety and is not an imminent risk of harm to self or others. Outpatient level of care is deemed appropriate at this present time. Renda Cranker understands that if they are no longer able to contract for safety, they need to call/contact the outpatient office including this writer, call/contact crisis and/orattend to the nearest Emergency Department for immediate evaluation. Risk of Harm to Self:  • The following ratings are based on assessment at the time of the interview  • Demographic risk factors include:   • Historical Risk Factors include: chronic depressive symptoms, chronic anxiety symptoms, history of traumatic experiences  • Recent Specific Risk Factors include: diagnosis of depression, current anxiety symptoms, worries about finances or work  • Protective Factors: no current suicidal ideation, access to mental health treatment, being a parent, being , compliant with mental health treatment, having a desire to be alive, opportunities to participate in community, resiliency, responsibilities and duties to others, stable living environment, stable job, supportive family, supportive friends  • Weapons: gun and handgun.  The following steps have been taken to ensure weapons are properly secured: locked, secured, ammo stored separately   • Based on today's assessment, Renda Cranker presents the following risk of harm to self: low     Risk of Harm to Others:  • The following ratings are based on assessment at the time of the interview  • Demographic Risk Factors include: under age 36. • Historical Risk Factors include: none. • Recent Specific Risk Factors include: concomitant mood disorder, multiple stressors. • Protective Factors: no current homicidal ideation, access to mental health treatment, being a parent, being , compliant with mental health treatment, opportunities to participate in community, resilience, responsibilities and duties to others, safe and stable living environment, supportive family, supportive friends  • Weapons: gun and handgun. The following steps have been taken to ensure weapons are properly secured: locked, secured, ammo stored separately  • Based on today's assessment, [facility] presents the following risk of harm to others: low    Psychotherapy Provided:     Individual psychotherapy provided: Yes  Medications, treatment progress and treatment plan reviewed with Lisa. Medication changes discussed with Lisa. Coping strategies reviewed with Lisa. Supportive therapy provided. Treatment Plan:    Completed and signed during the session: Not applicable - Treatment Plan not due at this session    This note was not shared with the patient due to reasonable likelihood of causing patient harm    Judy So DO   Psychiatry Resident, PGY-III    This note has been constructed using a voice recognition system. There may be translation, syntax, or grammatical errors. If you have any questions, please contact the dictating provider.

## 2023-08-29 ENCOUNTER — OFFICE VISIT (OUTPATIENT)
Dept: PSYCHIATRY | Facility: CLINIC | Age: 33
End: 2023-08-29
Payer: COMMERCIAL

## 2023-08-29 DIAGNOSIS — F41.9 ANXIETY DISORDER, UNSPECIFIED TYPE: ICD-10-CM

## 2023-08-29 DIAGNOSIS — G47.00 INSOMNIA, UNSPECIFIED TYPE: ICD-10-CM

## 2023-08-29 DIAGNOSIS — F33.1 MODERATE EPISODE OF RECURRENT MAJOR DEPRESSIVE DISORDER (HCC): Primary | ICD-10-CM

## 2023-08-29 DIAGNOSIS — E55.9 VITAMIN D DEFICIENCY: ICD-10-CM

## 2023-08-29 PROCEDURE — 99213 OFFICE O/P EST LOW 20 MIN: CPT | Performed by: PSYCHIATRY & NEUROLOGY

## 2023-08-29 RX ORDER — SERTRALINE HYDROCHLORIDE 100 MG/1
100 TABLET, FILM COATED ORAL DAILY
Qty: 30 TABLET | Refills: 1 | Status: SHIPPED | OUTPATIENT
Start: 2023-08-29 | End: 2023-10-28

## 2023-08-29 RX ORDER — TRAZODONE HYDROCHLORIDE 50 MG/1
50-150 TABLET ORAL
Qty: 45 TABLET | Refills: 1 | Status: SHIPPED | OUTPATIENT
Start: 2023-08-29 | End: 2023-10-28

## 2023-08-29 NOTE — PATIENT INSTRUCTIONS
Increase Trazodone to 100-150 mg at bedtime  Continue Zoloft 100 mg daily at night    Please send/bring neuropsychological testing results to next appointment  Please call the office nursing staff for medication issues including refills, problems obtaining medications, side effects, etc at [phone]. Please return for a follow up appointment as discussed. If you are running late or are unable to attend your appointment, please call our [facility] office at [phone]. If you are in psychological crisis including not limited to suicidal/homicidal thoughts or thoughts of self-injury, do not hesitate to call/contact your [facility] hotline (see below), call 911, call [phone] (mental health crisis), or go to the nearest emergency department.   [facility] Crisis: [address] Crisis: [phone]  [address] Crisis: [address] Crisis: [address] Crisis: [address] Crisis: [address] Crisis: [phone]  National Suicide Prevention Hotline: 5-624.355.8608

## 2023-09-12 DIAGNOSIS — F33.1 MODERATE EPISODE OF RECURRENT MAJOR DEPRESSIVE DISORDER (HCC): ICD-10-CM

## 2023-09-12 DIAGNOSIS — G47.00 INSOMNIA, UNSPECIFIED TYPE: ICD-10-CM

## 2023-09-12 RX ORDER — TRAZODONE HYDROCHLORIDE 50 MG/1
50-150 TABLET ORAL
Qty: 270 TABLET | Refills: 1 | OUTPATIENT
Start: 2023-09-12 | End: 2023-11-11

## 2023-10-03 DIAGNOSIS — G43.111 INTRACTABLE MIGRAINE WITH AURA WITH STATUS MIGRAINOSUS: ICD-10-CM

## 2023-10-04 RX ORDER — BUTALBITAL, ACETAMINOPHEN, CAFFEINE AND CODEINE PHOSPHATE 50; 325; 40; 30 MG/1; MG/1; MG/1; MG/1
CAPSULE ORAL
Qty: 15 CAPSULE | Refills: 0 | Status: SHIPPED | OUTPATIENT
Start: 2023-10-04

## 2023-10-23 DIAGNOSIS — G43.111 INTRACTABLE MIGRAINE WITH AURA WITH STATUS MIGRAINOSUS: ICD-10-CM

## 2023-10-23 RX ORDER — ONDANSETRON 4 MG/1
4 TABLET, FILM COATED ORAL EVERY 8 HOURS PRN
Qty: 20 TABLET | Refills: 1 | Status: SHIPPED | OUTPATIENT
Start: 2023-10-23

## 2023-10-26 DIAGNOSIS — G43.111 INTRACTABLE MIGRAINE WITH AURA WITH STATUS MIGRAINOSUS: ICD-10-CM

## 2023-10-26 RX ORDER — BUTALBITAL, ACETAMINOPHEN, CAFFEINE AND CODEINE PHOSPHATE 50; 325; 40; 30 MG/1; MG/1; MG/1; MG/1
1 CAPSULE ORAL EVERY 6 HOURS PRN
Qty: 15 CAPSULE | Refills: 0 | Status: SHIPPED | OUTPATIENT
Start: 2023-10-26

## 2023-11-14 NOTE — PSYCH
Psychiatric Follow Up Visit - [address] [place] [address]  MRN: [id number]    Assessment/Plan:   Margarita San is a 27 y.o. 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 Triston Jean, Ph.D., with suicide risk factors including access to lethal means (guns), depression and anxiety, with no prior suicide attempts or gestures, no prior inpatient psychiatric hospitalizations, who is presenting today for follow up. Patient was seen by this writer for transfer of care in 07/2023. Patient followed with [facility], Dr. Graciela Edgar, since 08/2021. In 07/2022, patient transitioned to a virtual psychiatric provider, [facility], and then re-established care with [facility] at 07/2023 appointment. Patient had testing completed in 10/2023 and brings documentation to appointment for review. Reviewed diagnoses and findings with patient. Patient endorses overall depressive symptoms remain stable, panic related symptoms continue to occur 3-4x/week, and difficulty with concentration, focus, and task organization persist. Discussed with patient sleep, which she reports is improving though she self discontinued Trazodone after 2 week trial due to minimal effectiveness. Patient continues with Zoloft 100 mg daily at bedtime, reports continued use of Topamax and Fioricet for migraine, patient endorses recent refill of Phentermine - though she did not realize the pharmacy was refilling that medication along with others and she is not currently taking medication and has not been since Spring/Summer.   Discussed medication options for ADHD related symptoms, including stimulant and non stimulant options. Discussed medication options for anxiety related symptoms. Patient agreeable to continue Zoloft at current dose, and to start Concerta ER 18 mg daily with stimulant holidays on non-work days. Patient remains agreeable to psychotherapy and is on multiple wait lists, including for [facility]. Diagnoses:  ADHD, predominantly inattentive type, mild  Moderate episode of recurrent major depressive disorder, in partial remission (HCC)  Anxiety disorder, unspecified, differential includes IGNACIO, Panic disorder, and PTSD  Insomnia, unspecified - at goal  Vitamin D deficiency    Treatment Recommendations/Precautions:  Patient self discontinued Trazodone as it did not help after 2 weeks of trialing medication  Start Concerta ER 18 mg daily for ADHD-related symptoms, take stimulant holidays on non-work days  Weight, BP, and HR assessed. /82 mmHg, HR 64 bpm, weight stable. Patient completed neuropsychological testing and brought in completed evaluation for review. PARQ completed including elevated heart rate, elevated bp, seizures, anxiety/irritability, activation/induction of zachary, abuse potential, interactions with other medications, risk of sudden death, appetite suppression/weight loss and other risks. PDMP reviewed, patient last filled phertermine on 11/15/23 - patient notes she is not currently take. Per PDMP, prescription was from 06/22/23 and was filled previously on 08/02/23. Continue Zoloft 100 mg daily at bedtime for mood and anxiety  PARQ completed including serotonin syndrome, SIADH, worsening depression, suicidality, induction of zachary, GI upset, headaches, activation, sexual side effects, sedation, potential drug interactions, and others.   Patient prescribed Topamax 50 mg QHS by PCP for migraine, may be augmenting for mood  May be contributing to decreased concentration and focus due to neurocognitive adverse effects, less likely at low dose though on differential.  Recommended to continue Vitamin D3 supplementation and monitoring levels  Previously referred to EULALIA lou, on wait list  Medication management every 6 weeks  Aware of need to follow up with family physician for medical issues  Aware of 24 hour and weekend coverage for urgent situations accessed by calling [facility] main practice number  Monitor BP, HR, and weight, CBC w/diff and CMP in setting of stimulant and SSRI prescriptions    Medication Risks/Benefits      Risks, Benefits And Possible Side Effects Of Medications:    Risks, benefits, and possible side effects of medications explained to [facility] and she verbalizes understanding and agreement for treatment. Controlled Medication Discussion:     [facility] has been filling controlled prescriptions on time as prescribed according to [address] [facility] [address] [facility]  Risks/benefits/alternativies to treatment discussed, including a myriad of potential adverse medication side effects: including elevated heart rate, elevated bp, seizures, anxiety/irritability, activation/induction of zachary, abuse potential, interactions with other medications, risk of sudden death, myocardial infarction, stroke, appetite suppression/weight loss and other risks. [facility] voiced understanding and consented to treatment.   ------------------------------------  History of Present Illness   Ana Cooney is presenting to follow up for anxiety, depression, and insomnia. [facility] reports her mood has been "the same," since last evaluation, with some increase in irritability. Patient denies a down, depressed mood. Patient notes "I'm not as tired, I think I'm sleeping a little bit better," she reports "I always feel fatigued." Patient is averaging 6 hours of sleep.  Patient endorses vivid dreams, denying nightmares or feeling they are distressing, though she notes they do awaken her from sleep at times and reports she is able to fall back asleep. She trialed the Trazodone x2 weeks then discontinued due to minimal effectiveness, she continues with good sleep hygiene techniques/behaviors. Patient endorses intermittent low motivation, feels she had an average of 10 days in the last 2 months where she experienced low motivation. Patient denies feelings of hopelessness or helplessness. Patient endorses appetite is unchanged, she notes weight fluctuating 5-10 lbs throughout the year for multiple years and this has not changed. She denies currently taking Phentermine - endorses recent filling of prescription was unintentional and script was from multiple months ago. When discussing effects from phentermine, patient endorses it would "wake her up," and discussing prior medications she noted the Wellbutrin XL makes her feel "unsteady," with dizziness when she has trialed it in the past.  Patient reports decreased concentration persists, as well as decreased focus and difficulty with task organization. The patient provides documentation and assessment from neuropsychological testing through [facility] and [employer], assessed by Dr. Adalgisa Schreiber, Ph.D. Patient was diagnosed with ADHD, inattentive type, mild with documentation on how anxiety symptoms may be contributing to perception of and difficulty managing ADHD related symptoms. Patient denies suicidal ideation, passive death wishes, homicidal ideations, and thoughts to harm self or others at time of evaluation. Patient is getting 1-2 headaches per week. Patient endorses anxiety has remained the same, with feeling "tensed and worked up," over the last few weeks.  Patient endorses panic symptoms are present still, with panic attacks occurring 3-4 times per week, lasting 10 minutes, she notes duration has improved and she has been using coping skills to help "talk myself down."  Patient endorses "irritability is higher than my anxiousness," at this time. She feels her "fuse is very short lately," "small things are getting to me," and does feel this is increased due to difficulty with concentration and focus. Patient endorses Zoloft does increase her fatigue, though notes she is tired at night after work regardless. Discussed with patient and attending physician present non stimulant vs stimulant options for treatment of ADHD, discussed further treatment for anxiety related symptoms given effect of the anxiety and ADHD related symptoms on each other. Patient remains agreeable to psychotherapy and is on multiple wait lists. Patient agreeable to initiation of Concerta ER 18 mg daily for ADHD related symptoms and reassessment. Psychiatric Review Of Systems:  Gillian reports Symptoms as described in HPI. Gillian denies Current suicidal thoughts, plan, or intent, Current thoughts of self-harm, or Current homicidal thoughts, plan, or intent. Medical Review Of Systems:  Complete review of systems is negative except as noted above.    ------------------------------------  All italicized information has been copied from previous psychiatric evaluation. Information has been reviewed with the patient.      Past Psychiatric History:   Prior psychiatric diagnoses: MDD, Anxiety  Inpatient hospitalizations: patient denies  Suicide attempts/self-harm: patient denies  Violent/aggressive behavior: patient denies  Outpatient psychiatric providers: patient previously in the interim, patient has been seeing a virtual psychiatrist - [facility], prior to this was following with primary care provider  Past/current psychotherapy: patient denies current psychotherapy, patient previously in therapy 4 years ago   Other Services: patient denies  Psychiatric medication trial:   Multiple including  Antidepressants  Paroxetine (Paxil), Citalopram (Celexa), Duloxetine (Cymbalta), Sertraline (Zoloft), Wellbutrin, Trazodone (retrial in 2023 with minimal effectiveness)  Mood stabilizers  Lamotrigine (Lamictal), Topiramate (Topamax) - migraine  Sedative hypnotics  Xanax, Ativan  Others  BuSpar, Atarax     Substance Abuse History:  I have assessed this patient for substance use within the past 12 months. Patient denies use of tobacco, alcohol, or illicit drugs. Patient did smoke cigarettes - less than 1 ppd previously. Denies past legal actions or arrests secondary to substance intoxication. The patient denies prior DWIs/DUIs. Amber Lara does not exhibit objective evidence of substance withdrawal during today's examination nor does Lisa appear under the influence of any psychoactive substance. Family Psychiatric History: Mother: depression, anxiety  Sister: depression, anxiety, OCD     Social History  Early life/developmental: Denies a history of milestone/developmental delay. Denies a history of in-utero exposure to toxins/illicit substances. Marital history: /Civil Union ,  in April Lyndseyvenbea Joyce)  Children: 1 stepdaughter, 7 y/o Russell)  Living arrangement: Lives in a home with wife and stepdaughter, 2 Norwegian shepherds and 1 cat. 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    Visit Vitals  OB Status Unknown   Smoking Status Former      [employer] Readings from Last 6 Encounters:   06/13/23 62.2 kg (137 lb 3.2 oz)   12/23/22 61.7 kg (136 lb)   07/26/22 68.2 kg (150 lb 6.4 oz)   01/26/22 69.4 kg (153 lb)   12/23/21 70.5 kg (155 lb 6.4 oz)   08/23/21 66.8 kg (147 lb 3.2 oz)      Mental Status Exam:  Appearance:  alert, good eye contact, appears stated age, casually dressed, appropriate grooming and hygiene, and wearing glasses   Behavior:  calm, cooperative, and sitting comfortably   Motor: no abnormal movements and normal gait and balance   Speech:  spontaneous, clear, normal rate, not pressured, normal volume, not hyperverbal, and coherent   Mood:  "The same"   Affect:  mood-congruent and anxious   Thought Process:  Organized, logical, goal-directed   Thought Content: no verbalized delusions or overt paranoia, intermittent negative thinking   Perceptual disturbances: no reported hallucinations and does not appear to be responding to internal stimuli at this time   Risk Potential: No active or passive suicidal or homicidal ideation was verbalized during interview   Cognition: oriented to person, place, time, and situation, memory grossly intact, appears to be of average intelligence, and age-appropriate attention span and concentration   Insight:  Fair   Judgment: Good       Meds/Allergies    No Known Allergies  Current Outpatient Medications   Medication Instructions    butalbital-acetaminophen-caffeine-codeine (FIORICET WITH CODEINE) -67-30 MG per capsule 1 capsule, Oral, Every 6 hours PRN    cholecalciferol (VITAMIN D3) 1,000 units tablet TAKE 1 TABLET BY MOUTH EVERY DAY    cyclobenzaprine (FLEXERIL) 10 mg, Oral, 3 times daily PRN    lidocaine (Lidoderm) 5 % 1 patch, Topical, Daily, Remove & Discard patch within 12 hours or as directed by MD    Lidocaine Viscous HCl (XYLOCAINE) 2 % mucosal solution Mix 3 tsp (15 ml) in glass of water and gargle and spit every 6 hours as needed for throat pain. Avoid swallowing if possible. ondansetron (ZOFRAN) 4 mg, Oral, Every 8 hours PRN    sertraline (ZOLOFT) 100 mg, Oral, Daily    topiramate (TOPAMAX) 50 MG tablet TAKE 1 TABLET BY MOUTH TWICE A DAY    traZODone (DESYREL)  mg, Oral, Daily at bedtime PRN        Labs & Imaging:  I have personally reviewed all pertinent laboratory tests and imaging results. No visits with results within 2 Month(s) from this visit.    Latest known visit with results is:   Admission on 03/26/2023, Discharged on 03/26/2023   Component Date Value Ref Range Status    WBC 03/26/2023 11.67 (H)  4.31 - 10.16 Thousand/uL Final    RBC 03/26/2023 3.90  3.81 - 5.12 Million/uL Final    Hemoglobin 03/26/2023 12.4  11.5 - 15.4 g/dL Final    Hematocrit 03/26/2023 38.0  34.8 - 46.1 % Final    MCV 03/26/2023 97  82 - 98 fL Final    MCH 03/26/2023 31.8  26.8 - 34.3 pg Final    MCHC 03/26/2023 32.6  31.4 - 37.4 g/dL Final    RDW 03/26/2023 12.3  11.6 - 15.1 % Final    MPV 03/26/2023 9.6  8.9 - 12.7 fL Final    Platelets 36/71/4849 313  149 - 390 Thousands/uL Final    nRBC 03/26/2023 0  /100 WBCs Final    Neutrophils Relative 03/26/2023 62  43 - 75 % Final    Immat GRANS % 03/26/2023 0  0 - 2 % Final    Lymphocytes Relative 03/26/2023 30  14 - 44 % Final    Monocytes Relative 03/26/2023 7  4 - 12 % Final    Eosinophils Relative 03/26/2023 1  0 - 6 % Final    Basophils Relative 03/26/2023 0  0 - 1 % Final    Neutrophils Absolute 03/26/2023 7.21  1.85 - 7.62 Thousands/µL Final    Immature Grans Absolute 03/26/2023 0.03  0.00 - 0.20 Thousand/uL Final    Lymphocytes Absolute 03/26/2023 3.50  0.60 - 4.47 Thousands/µL Final    Monocytes Absolute 03/26/2023 0.79  0.17 - 1.22 Thousand/µL Final    Eosinophils Absolute 03/26/2023 0.09  0.00 - 0.61 Thousand/µL Final    Basophils Absolute 03/26/2023 0.05  0.00 - 0.10 Thousands/µL Final    Sodium 03/26/2023 141  135 - 147 mmol/L Final    Potassium 03/26/2023 4.0 3.5 - 5.3 mmol/L Final    Chloride 03/26/2023 111 (H)  96 - 108 mmol/L Final    CO2 03/26/2023 23  21 - 32 mmol/L Final    ANION GAP 03/26/2023 7  4 - 13 mmol/L Final    BUN 03/26/2023 14  5 - 25 mg/dL Final    Creatinine 03/26/2023 0.83  0.60 - 1.30 mg/dL Final    Standardized to IDMS reference method    Glucose 03/26/2023 91  65 - 140 mg/dL Final    If the patient is fasting, the ADA then defines impaired fasting glucose as > 100 mg/dL and diabetes as > or equal to 123 mg/dL. Calcium 03/26/2023 9.3  8.4 - 10.2 mg/dL Final    eGFR 03/26/2023 92  ml/min/1.73sq m Final    D-Dimer, Quant 03/26/2023 0.30  <0.50 ug/ml FEU Final    Reference and upper limits to exclude DVT and PE are the same. Do not use to exclude if clinical symptoms are present. Pregnant women:  1st trimester:  <0.22 - 1.06 ug/ml FEU  2nd trimester:  <0.22 - 1.88 ug/ml FEU  3rd trimester:   0.24 - 3.28 ug/ml FEU    Note: Normal ranges may not apply to patients who are transgender, non-binary, or whose legal sex, sex at birth, and gender identity differ. ---------------------------------------  Although patient's acute lethality risk is low, long-term/chronic lethality risk is mildly elevated in the presence of anxiety, decreased sleep, work and home stressors, history of traumatic experiences. At the current moment, Haleigh Stone is future-oriented, forward-thinking, and demonstrates ability to act in a self-preserving manner as evidenced by volitionally presenting to the clinic today, seeking treatment. At this juncture, inpatient hospitalization is not currently warranted. To mitigate future risk, patient should adhere to the recommendations of this writer, avoid alcohol/illicit substance use, utilize community-based resources and familiar support and prioritize mental health treatment. Based on today's assessment and clinical criteria, [facility]ahlia Roper contracts for safety and is not an imminent risk of harm to self or others. Outpatient level of care is deemed appropriate at this present time. Jearlean Merlin understands that if they are no longer able to contract for safety, they need to call/contact the outpatient office including this writer, call/contact crisis and/orattend to the nearest Emergency Department for immediate evaluation. Risk of Harm to Self:  The following ratings are based on assessment at the time of the interview  Demographic risk factors include:   Historical Risk Factors include: chronic depressive symptoms, chronic anxiety symptoms, history of traumatic experiences  Recent Specific Risk Factors include: diagnosis of depression, current anxiety symptoms, worries about finances or work  Protective Factors: no current suicidal ideation, access to mental health treatment, being a parent, being , compliant with mental health treatment, having a desire to be alive, opportunities to participate in community, resiliency, responsibilities and duties to others, stable living environment, stable job, supportive family, supportive friends  Weapons: gun and handgun. The following steps have been taken to ensure weapons are properly secured: locked, secured, ammo stored separately   Based on today's assessment, Jearlean Merlin presents the following risk of harm to self: low     Risk of Harm to Others: The following ratings are based on assessment at the time of the interview  Demographic Risk Factors include: under age 36. Historical Risk Factors include: none. Recent Specific Risk Factors include: concomitant mood disorder, multiple stressors. Protective Factors: no current homicidal ideation, access to mental health treatment, being a parent, being , compliant with mental health treatment, opportunities to participate in community, resilience, responsibilities and duties to others, safe and stable living environment, supportive family, supportive friends  Weapons: gun and handgun.  The following steps have been taken to ensure weapons are properly secured: locked, secured, ammo stored separately  Based on today's assessment, Keyla Wilsons presents the following risk of harm to others: low    Psychotherapy Provided:     Individual psychotherapy provided: Yes  Counseling was provided during the session today for 15 minutes. Medications, treatment progress and treatment plan reviewed with Keyla Maza. Recent stressor including job stress and ongoing anxiety discussed with Lisa. Supportive therapy provided. Treatment Plan:    Completed and signed during the session: Not applicable - Treatment Plan not due at this session    This note was shared with patient. Visit Time:  Start Time: 2448  Stop Time: 1005  Total Visit Duration:  50 minutes    Dina Natarajan, DO  Psychiatry Resident, PGY-III    This note has been constructed using a voice recognition system. There may be translation, syntax, or grammatical errors. If you have any questions, please contact the dictating provider.

## 2023-11-15 DIAGNOSIS — G43.111 INTRACTABLE MIGRAINE WITH AURA WITH STATUS MIGRAINOSUS: ICD-10-CM

## 2023-11-15 RX ORDER — BUTALBITAL, ACETAMINOPHEN, CAFFEINE AND CODEINE PHOSPHATE 50; 325; 40; 30 MG/1; MG/1; MG/1; MG/1
1 CAPSULE ORAL EVERY 6 HOURS PRN
Qty: 15 CAPSULE | Refills: 0 | OUTPATIENT
Start: 2023-11-15

## 2023-11-15 RX ORDER — BUTALBITAL, ACETAMINOPHEN, CAFFEINE AND CODEINE PHOSPHATE 50; 325; 40; 30 MG/1; MG/1; MG/1; MG/1
CAPSULE ORAL
Qty: 15 CAPSULE | Refills: 0 | Status: SHIPPED | OUTPATIENT
Start: 2023-11-15

## 2023-11-15 RX ORDER — ONDANSETRON 4 MG/1
4 TABLET, FILM COATED ORAL EVERY 8 HOURS PRN
Qty: 20 TABLET | Refills: 0 | Status: SHIPPED | OUTPATIENT
Start: 2023-11-15

## 2023-11-20 ENCOUNTER — OFFICE VISIT (OUTPATIENT)
Dept: PSYCHIATRY | Facility: CLINIC | Age: 33
End: 2023-11-20
Payer: COMMERCIAL

## 2023-11-20 VITALS
HEART RATE: 64 BPM | WEIGHT: 140.9 LBS | SYSTOLIC BLOOD PRESSURE: 123 MMHG | DIASTOLIC BLOOD PRESSURE: 82 MMHG | BODY MASS INDEX: 26.62 KG/M2

## 2023-11-20 DIAGNOSIS — F90.0 ADHD (ATTENTION DEFICIT HYPERACTIVITY DISORDER), INATTENTIVE TYPE: Primary | ICD-10-CM

## 2023-11-20 DIAGNOSIS — G47.00 INSOMNIA, UNSPECIFIED TYPE: ICD-10-CM

## 2023-11-20 DIAGNOSIS — F33.1 MODERATE EPISODE OF RECURRENT MAJOR DEPRESSIVE DISORDER (HCC): ICD-10-CM

## 2023-11-20 DIAGNOSIS — F41.9 ANXIETY DISORDER, UNSPECIFIED TYPE: ICD-10-CM

## 2023-11-20 PROCEDURE — 99213 OFFICE O/P EST LOW 20 MIN: CPT

## 2023-11-20 RX ORDER — METHYLPHENIDATE HYDROCHLORIDE 18 MG/1
18 TABLET ORAL DAILY
Qty: 30 TABLET | Refills: 0 | Status: SHIPPED | OUTPATIENT
Start: 2023-11-20 | End: 2023-11-27 | Stop reason: SDUPTHER

## 2023-11-20 NOTE — PATIENT INSTRUCTIONS
Start Concerta ER 18 mg daily after breakfast (take stimulant holidays on days without work)  Continue Zoloft 100 mg daily at bedtime    Please call the office nursing staff for medication issues including refills, problems obtaining medications, side effects, etc at [phone]. Please return for a follow up appointment as discussed. If you are running late or are unable to attend your appointment, please call our [facility] office at [phone]. If you are in psychological crisis including not limited to suicidal/homicidal thoughts or thoughts of self-injury, do not hesitate to call/contact your [facility] hotline (see below), call 911, call [phone] (mental health crisis), or go to the nearest emergency department.   [facility] [place] Crisis: [address] Crisis: [phone]  [address] Crisis: [address] Crisis: [address] Crisis: [address] Crisis: [address] Crisis: [phone]  National Suicide Prevention Hotline: 7-997.648.4335

## 2023-11-22 ENCOUNTER — VBI (OUTPATIENT)
Dept: ADMINISTRATIVE | Facility: OTHER | Age: 33
End: 2023-11-22

## 2023-11-24 ENCOUNTER — TELEPHONE (OUTPATIENT)
Dept: PSYCHIATRY | Facility: CLINIC | Age: 33
End: 2023-11-24

## 2023-11-24 DIAGNOSIS — F90.0 ADHD (ATTENTION DEFICIT HYPERACTIVITY DISORDER), INATTENTIVE TYPE: Primary | ICD-10-CM

## 2023-11-24 NOTE — TELEPHONE ENCOUNTER
Patient called requesting to speak to provider as she has an medication question.  Pt can be reached at [phone]

## 2023-11-24 NOTE — TELEPHONE ENCOUNTER
Spoke with [employer]. She reports she has been on the Concerta for about 6 days. She eats breakfast before she takes it around 9 am. By 2 pm she feels a "crash" and feels she can barely function. She is looking of advice. Please review ion RTO as [employer] is aware Monday the message will be reviewed.

## 2023-11-27 RX ORDER — METHYLPHENIDATE HYDROCHLORIDE 27 MG/1
27 TABLET ORAL DAILY
Qty: 30 TABLET | Refills: 0 | Status: SHIPPED | OUTPATIENT
Start: 2023-11-27 | End: 2023-12-27

## 2023-11-27 NOTE — TELEPHONE ENCOUNTER
Returned call to patient, Radha Goldberg ([phone]), to discuss medication questions. Patient reports she has not been taking medication for 3 days, as it was working for 2-3 hours and then she "crashes."  For first few days she took Concerta ER 18 mg, she was taking at 9:00 am after breakfast. She noticed wearing off in 2-3 hours and moved medication to 12:00 pm, thirty minutes prior to work. She reports "I would feel good for 1-2 hours," and she reports afterwards she experienced increased fatigue and would take a day time nap. Patient denied adverse effects of increased heart rate, decreased appetite, sleep disturbance, or other related side effects. Discussed with patient option to increase to Concerta ER 27 mg daily taking at 12:00 pm prior to work shifts. Reviewed possible adverse effects again with patient who expressed understanding, and she is agreeable to increased dose of medication. Patient did not endorse SI, HI, passive death wishes, or thoughts to harm self or others at time of phone call. Patient instructed to call with questions/concerns/adverse effects. Patient aware of 24/7 coverage through [facility] main line.

## 2023-12-08 ENCOUNTER — OFFICE VISIT (OUTPATIENT)
Dept: PSYCHIATRY | Facility: CLINIC | Age: 33
End: 2023-12-08
Payer: COMMERCIAL

## 2023-12-08 VITALS — HEART RATE: 86 BPM | SYSTOLIC BLOOD PRESSURE: 108 MMHG | DIASTOLIC BLOOD PRESSURE: 75 MMHG

## 2023-12-08 DIAGNOSIS — F41.9 ANXIETY DISORDER, UNSPECIFIED TYPE: ICD-10-CM

## 2023-12-08 DIAGNOSIS — F90.0 ADHD (ATTENTION DEFICIT HYPERACTIVITY DISORDER), INATTENTIVE TYPE: Primary | ICD-10-CM

## 2023-12-08 DIAGNOSIS — E55.9 VITAMIN D DEFICIENCY: ICD-10-CM

## 2023-12-08 DIAGNOSIS — G47.00 INSOMNIA, UNSPECIFIED TYPE: ICD-10-CM

## 2023-12-08 DIAGNOSIS — F33.1 MODERATE EPISODE OF RECURRENT MAJOR DEPRESSIVE DISORDER (HCC): ICD-10-CM

## 2023-12-08 PROCEDURE — 99213 OFFICE O/P EST LOW 20 MIN: CPT

## 2023-12-08 RX ORDER — LISDEXAMFETAMINE DIMESYLATE CAPSULES 30 MG/1
30 CAPSULE ORAL EVERY MORNING
Qty: 30 CAPSULE | Refills: 0 | Status: SHIPPED | OUTPATIENT
Start: 2023-12-08 | End: 2024-01-07

## 2023-12-08 NOTE — PSYCH
Psychiatric Follow Up Visit - [address] [place] [address]  MRN: [id number]    Assessment/Plan:   Keny Crooks  is a 27 y.o. 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 Natalia Velasco, Ph.D., with suicide risk factors including access to lethal means (guns), depression and anxiety, with no prior suicide attempts or gestures, no prior inpatient psychiatric hospitalizations, who is presenting today for follow up. Patient was seen by this writer for transfer of care in 07/2023. Patient followed with Gus Romberg, Dr. Neo Holt, since 08/2021. In 07/2022, patient transitioned to a virtual psychiatric provider, [facility], and then re-established care with [facility] at 07/2023 appointment. On last evaluation, patient provided documentation from neuropsychological assessment which demonstrated diagnosis of ADHD, inattentive type, mild. Patient was started on Concerta ER 18 mg, since last evaluation patient contacted office to endorse adverse effect of fatigue when Concerta ER was wearing off. Medication was increased to 27 mg daily with persistence of adverse effects. Today, patient endorses improvement in ADHD related symptoms, continuation of fatigue, and no worsening of anxiety related symptoms since initiation of stimulant prescription. Discussed options of non stimulant vs switching to other stimulant class and reviewed risk/benefits and indications. Patient preference to trial Vyvanse 30 mg daily with follow up. Patient to continue on current dose of Zoloft 100 mg daily.  Patient remains agreeable to psychotherapy and is on waitlists. Diagnoses:  ADHD, predominantly inattentive type, mild  Moderate episode of recurrent major depressive disorder, in partial remission  Anxiety disorder, unspecified, differential includes IGNACIO, Panic disorder, and PTSD  Insomnia, unspecified  Vitamin D deficiency    Treatment Recommendations/Precautions:  Patient previously started on Concerta ER 18 mg daily at last appointment, after evaluation patient contacted office to discuss medications and medication was increased to Concerta ER 27 mg, adverse effects continued. Discontinued Concerta ER   Start Vyvanse 30 mg daily for ADHD related symptoms  PARQ completed including elevated heart rate, elevated bp, seizures, anxiety/irritability, activation/induction of zachary, abuse potential, interactions with other medications, risk of sudden death, appetite suppression/weight loss and other risks. PDMP reviewed, Concerta ER filled on 02/86/47  Continue Zoloft 100 mg daily at bedtime for mood and anxiety  PARQ completed including serotonin syndrome, SIADH, worsening depression, suicidality, induction of zachary, GI upset, headaches, activation, sexual side effects, sedation, potential drug interactions, and others.   Patient prescribed Topamax 50 mg QHS by PCP for migraine, may be augmenting for mood  Recommended continued Vitamin D3 supplementation and monitoring levels  Referral for [facility] psychotherapy previously completed  Monitor BP, HR, and weight, CBC w/diff, and CMP in setting of stimulant and SSRI prescriptions   Medication management every 4 weeks  Aware of need to follow up with family physician for medical issues  Aware of 24 hour and weekend coverage for urgent situations accessed by calling [facility] main practice number    Medication Risks/Benefits      Risks, Benefits And Possible Side Effects Of Medications:    Risks, benefits, and possible side effects of medications explained to Lisa and she verbalizes understanding and agreement for treatment. Controlled Medication Discussion:     Daphnie Oneal has been filling controlled prescriptions on time as prescribed according to [address] [facility]  Risks/benefits/alternativies to treatment discussed, including a myriad of potential adverse medication side effects: including elevated heart rate, elevated bp, seizures, anxiety/irritability, activation/induction of zachary, abuse potential, interactions with other medications, risk of sudden death, myocardial infarction, stroke, appetite suppression/weight loss and other risks. Daphnie Oneal voiced understanding and consented to treatment.     ------------------------------------  History of Present Illness   Km Genet is presenting to follow up to discuss medication adverse effects. Daphnie Oneal reports her mood has been stable, and denies increase in anxiety related symptoms since initiation of Concerta. Patient denies changes to appetite. Since last evaluation, patient called to discuss possible adverse effects of medication, per phone call on 11/27/23:   "Patient reports she has not been taking medication for 3 days, as it was working for 2-3 hours and then she "crashes."  For first few days she took Concerta ER 18 mg, she was taking at 9:00 am after breakfast. She noticed wearing off in 2-3 hours and moved medication to 12:00 pm, thirty minutes prior to work. She reports "I would feel good for 1-2 hours," and she reports afterwards she experienced increased fatigue and would take a day time nap. Patient denied adverse effects of increased heart rate, decreased appetite, sleep disturbance, or other related side effects. Discussed with patient option to increase to Concerta ER 27 mg daily taking at 12:00 pm prior to work shifts.  Reviewed possible adverse effects again with patient who expressed understanding, and she is agreeable to increased dose of medication."    After increase, patient contacted office again to note continuation of "crash," and previously reported adverse effect on increased dose on Concerta. Today, patient notes she "felt the same as the lower dose," "it works, I feel I have energy, getting stuff down," for 2-3 hours and then the patient gradually feels herself "crashing," and experiences fatigue. The patient notes improvement in concentration, focus, task completion, and decrease in resistance and avoidance of tasks since starting medication. Patient notes one benefit of fatigue is she is sleeping better, falling asleep well and staying asleep, she reports waking rested in the morning which has been a positive. The patient does not endorse or demonstrate symptoms consistent with zachary/hypomania, or negative or positive symptoms of psychosis at time of evaluation. Discussed with patient options to transition to alternate option within stimulant class (Vyvanse), or move to non stimulant medication options (Strattera). Discussed risk/benefits, reviewed adverse effects, and indications for each. Patient would like to move forward with trial of Vyvanse and notify provider of continuing, new, or worsening adverse effects. Psychiatric Review Of Systems:  [facility] reports Symptoms as described in HPI. [facility] denies Current suicidal thoughts, plan, or intent, Current thoughts of self-harm, or Current homicidal thoughts, plan, or intent. Medical Review Of Systems:  Complete review of systems is negative except as noted above.    ------------------------------------  All italicized information has been copied from previous psychiatric evaluation. Information has been reviewed with the patient.      Past Psychiatric History:   Prior psychiatric diagnoses: MDD, Anxiety  Inpatient hospitalizations: patient denies  Suicide attempts/self-harm: patient denies  Violent/aggressive behavior: patient denies  Outpatient psychiatric providers: patient previously in the interim, patient has been seeing a virtual psychiatrist - [facility], prior to this was following with primary care provider  Past/current psychotherapy: patient denies current psychotherapy, patient previously in therapy 4 years ago   Other Services: patient denies  Psychiatric medication trial:   Multiple including  Antidepressants  Paroxetine (Paxil), Citalopram (Celexa), Duloxetine (Cymbalta), Sertraline (Zoloft), Wellbutrin, Trazodone (retrial in 2023 with minimal effectiveness)  Mood stabilizers  Lamotrigine (Lamictal), Topiramate (Topamax) - migraine  Sedative hypnotics  Xanax, Ativan  Others  BuSpar, Atarax     Substance Abuse History:  I have assessed this patient for substance use within the past 12 months. Patient denies use of tobacco, alcohol, or illicit drugs. Patient did smoke cigarettes - less than 1 ppd previously. Denies past legal actions or arrests secondary to substance intoxication. The patient denies prior DWIs/DUIs. Sherice Curry does not exhibit objective evidence of substance withdrawal during today's examination nor does Lisa appear under the influence of any psychoactive substance. Family Psychiatric History: Mother: depression, anxiety  Sister: depression, anxiety, OCD     Social History  Early life/developmental: Denies a history of milestone/developmental delay. Denies a history of in-utero exposure to toxins/illicit substances. Marital history: /Civil Union ,  in April Margot Mins)  Children: 1 stepdaughter, 9 y/o Russell)  Living arrangement: Lives in a home with wife and stepdaughter, 2 [place] shepherds and 1 cat.   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    Visit Vitals  OB Status Unknown   Smoking Status Former      [employer] Readings from Last 6 Encounters:   11/20/23 63.9 kg (140 lb 14.4 oz)   06/13/23 62.2 kg (137 lb 3.2 oz)   12/23/22 61.7 kg (136 lb)   07/26/22 68.2 kg (150 lb 6.4 oz)   01/26/22 69.4 kg (153 lb)   12/23/21 70.5 kg (155 lb 6.4 oz)            Mental Status Exam:  Appearance:  alert, good eye contact, appears stated age, casually dressed, appropriate grooming and hygiene, and wearing glasses   Behavior:  calm, cooperative, and sitting comfortably   Motor: no abnormal movements and normal gait and balance   Speech:  spontaneous, clear, normal rate, not pressured, normal volume, not hyperverbal, and coherent   Mood:  "good"   Affect:  mood-congruent and anxious at times   Thought Process:  Organized, logical, goal-directed   Thought Content: no verbalized delusions or overt paranoia   Perceptual disturbances: no reported hallucinations and does not appear to be responding to internal stimuli at this time   Risk Potential: No active or passive suicidal or homicidal ideation was verbalized during interview   Cognition: oriented to person, place, time, and situation, memory grossly intact, appears to be of average intelligence, and age-appropriate attention span and concentration   Insight:  Good   Judgment: Good       Meds/Allergies    No Known Allergies  Current Outpatient Medications   Medication Instructions butalbital-acetaminophen-caffeine-codeine (FIORICET WITH CODEINE) -94-30 MG per capsule TAKE 1 CAPSULE BY MOUTH EVERY 6 HOURS AS NEEDED FOR HEADACHES. cholecalciferol (VITAMIN D3) 1,000 units tablet TAKE 1 TABLET BY MOUTH EVERY DAY    cyclobenzaprine (FLEXERIL) 10 mg, Oral, 3 times daily PRN    lidocaine (Lidoderm) 5 % 1 patch, Topical, Daily, Remove & Discard patch within 12 hours or as directed by MD    Lidocaine Viscous HCl (XYLOCAINE) 2 % mucosal solution Mix 3 tsp (15 ml) in glass of water and gargle and spit every 6 hours as needed for throat pain. Avoid swallowing if possible. methylphenidate (CONCERTA) 27 mg, Oral, Daily    ondansetron (ZOFRAN) 4 mg, Oral, Every 8 hours PRN    sertraline (ZOLOFT) 100 mg, Oral, Daily    topiramate (TOPAMAX) 50 MG tablet TAKE 1 TABLET BY MOUTH TWICE A DAY        Labs & Imaging:  I have personally reviewed all pertinent laboratory tests and imaging results. No visits with results within 2 Month(s) from this visit.    Latest known visit with results is:   Admission on 03/26/2023, Discharged on 03/26/2023   Component Date Value Ref Range Status    WBC 03/26/2023 11.67 (H)  4.31 - 10.16 Thousand/uL Final    RBC 03/26/2023 3.90  3.81 - 5.12 Million/uL Final    Hemoglobin 03/26/2023 12.4  11.5 - 15.4 g/dL Final    Hematocrit 03/26/2023 38.0  34.8 - 46.1 % Final    MCV 03/26/2023 97  82 - 98 fL Final    MCH 03/26/2023 31.8  26.8 - 34.3 pg Final    MCHC 03/26/2023 32.6  31.4 - 37.4 g/dL Final    RDW 03/26/2023 12.3  11.6 - 15.1 % Final    MPV 03/26/2023 9.6  8.9 - 12.7 fL Final    Platelets 79/22/0868 313  149 - 390 Thousands/uL Final    nRBC 03/26/2023 0  /100 WBCs Final    Neutrophils Relative 03/26/2023 62  43 - 75 % Final    Immat GRANS % 03/26/2023 0  0 - 2 % Final    Lymphocytes Relative 03/26/2023 30  14 - 44 % Final    Monocytes Relative 03/26/2023 7  4 - 12 % Final    Eosinophils Relative 03/26/2023 1  0 - 6 % Final    Basophils Relative 03/26/2023 0  0 - 1 % Final    Neutrophils Absolute 03/26/2023 7.21  1.85 - 7.62 Thousands/µL Final    Immature Grans Absolute 03/26/2023 0.03  0.00 - 0.20 Thousand/uL Final    Lymphocytes Absolute 03/26/2023 3.50  0.60 - 4.47 Thousands/µL Final    Monocytes Absolute 03/26/2023 0.79  0.17 - 1.22 Thousand/µL Final    Eosinophils Absolute 03/26/2023 0.09  0.00 - 0.61 Thousand/µL Final    Basophils Absolute 03/26/2023 0.05  0.00 - 0.10 Thousands/µL Final    Sodium 03/26/2023 141  135 - 147 mmol/L Final    Potassium 03/26/2023 4.0  3.5 - 5.3 mmol/L Final    Chloride 03/26/2023 111 (H)  96 - 108 mmol/L Final    CO2 03/26/2023 23  21 - 32 mmol/L Final    ANION GAP 03/26/2023 7  4 - 13 mmol/L Final    BUN 03/26/2023 14  5 - 25 mg/dL Final    Creatinine 03/26/2023 0.83  0.60 - 1.30 mg/dL Final    Standardized to IDMS reference method    Glucose 03/26/2023 91  65 - 140 mg/dL Final    If the patient is fasting, the ADA then defines impaired fasting glucose as > 100 mg/dL and diabetes as > or equal to 123 mg/dL. Calcium 03/26/2023 9.3  8.4 - 10.2 mg/dL Final    eGFR 03/26/2023 92  ml/min/1.73sq m Final    D-Dimer, Quant 03/26/2023 0.30  <0.50 ug/ml FEU Final    Reference and upper limits to exclude DVT and PE are the same. Do not use to exclude if clinical symptoms are present. Pregnant women:  1st trimester:  <0.22 - 1.06 ug/ml FEU  2nd trimester:  <0.22 - 1.88 ug/ml FEU  3rd trimester:   0.24 - 3.28 ug/ml FEU    Note: Normal ranges may not apply to patients who are transgender, non-binary, or whose legal sex, sex at birth, and gender identity differ. ---------------------------------------    Although patient's acute lethality risk is low, long-term/chronic lethality risk is mildly elevated in the presence of anxiety, decreased sleep, work and home stressors, history of traumatic experiences.  At the current moment, Barak Marcos is future-oriented, forward-thinking, and demonstrates ability to act in a self-preserving manner as evidenced by volitionally presenting to the clinic today, seeking treatment. At this juncture, inpatient hospitalization is not currently warranted. To mitigate future risk, patient should adhere to the recommendations of this writer, avoid alcohol/illicit substance use, utilize community-based resources and familiar support and prioritize mental health treatment. Based on today's assessment and clinical criteria, Moriah Lewis contracts for safety and is not an imminent risk of harm to self or others. Outpatient level of care is deemed appropriate at this present time. Barak Marcos understands that if they are no longer able to contract for safety, they need to call/contact the outpatient office including this writer, call/contact crisis and/orattend to the nearest Emergency Department for immediate evaluation. Risk of Harm to Self:  The following ratings are based on assessment at the time of the interview  Demographic risk factors include:   Historical Risk Factors include: chronic depressive symptoms, chronic anxiety symptoms, history of traumatic experiences  Recent Specific Risk Factors include: diagnosis of depression, current anxiety symptoms, worries about finances or work  Protective Factors: no current suicidal ideation, access to mental health treatment, being a parent, being , compliant with mental health treatment, having a desire to be alive, opportunities to participate in community, resiliency, responsibilities and duties to others, stable living environment, stable job, supportive family, supportive friends  Weapons: gun and handgun. The following steps have been taken to ensure weapons are properly secured: locked, secured, ammo stored separately   Based on today's assessment, Barak Marcos presents the following risk of harm to self: low     Risk of Harm to Others:   The following ratings are based on assessment at the time of the interview  Demographic Risk Factors include: under age 40.  Historical Risk Factors include: none. Recent Specific Risk Factors include: concomitant mood disorder, multiple stressors. Protective Factors: no current homicidal ideation, access to mental health treatment, being a parent, being , compliant with mental health treatment, opportunities to participate in community, resilience, responsibilities and duties to others, safe and stable living environment, supportive family, supportive friends  Weapons: gun and handgun. The following steps have been taken to ensure weapons are properly secured: locked, secured, ammo stored separately  Based on today's assessment, Maxim Grimes presents the following risk of harm to others: low    Psychotherapy Provided:     Individual psychotherapy provided: Medications, treatment progress and treatment plan reviewed with Lisa. Supportive therapy provided. Crisis/safety plan discussed with Lisa. Treatment Plan:    Completed and signed during the session: Not applicable - Treatment Plan not due at this session    This note was shared with patient. Visit Time:  Start Time: 4213  Stop Time: 0915  Total Visit Duration:  40 minutes    Hitesh Garcia DO  Psychiatry Resident, PGY-III    This note has been constructed using a voice recognition system. There may be translation, syntax, or grammatical errors. If you have any questions, please contact the dictating provider.

## 2023-12-08 NOTE — PATIENT INSTRUCTIONS
Discontinue Concerta  Start Vyvanse 30 mg daily - take prior to work shifts  Continue Zoloft 100 mg daily    Please call the office nursing staff for medication issues including refills, problems obtaining medications, side effects, etc at [phone]. Please return for a follow up appointment as discussed. If you are running late or are unable to attend your appointment, please call our [facility] office at [phone]. If you are in psychological crisis including not limited to suicidal/homicidal thoughts or thoughts of self-injury, do not hesitate to call/contact your [facility] hotline (see below), call 911, call 65 (mental health crisis), or go to the nearest emergency department.   [facility] Crisis: [address] Crisis: [phone]  [address] Crisis: [address] Crisis: [address] Crisis: [address] Crisis: [address] Crisis: [phone]  National Suicide Prevention Hotline: 1-228.895.1497

## 2023-12-09 DIAGNOSIS — G43.111 INTRACTABLE MIGRAINE WITH AURA WITH STATUS MIGRAINOSUS: ICD-10-CM

## 2023-12-11 DIAGNOSIS — F90.0 ADHD (ATTENTION DEFICIT HYPERACTIVITY DISORDER), INATTENTIVE TYPE: ICD-10-CM

## 2023-12-11 DIAGNOSIS — Z30.9 ENCOUNTER FOR CONTRACEPTIVE MANAGEMENT, UNSPECIFIED TYPE: ICD-10-CM

## 2023-12-11 RX ORDER — DROSPIRENONE AND ETHINYL ESTRADIOL 0.02-3(28)
1 KIT ORAL DAILY
Qty: 90 TABLET | Refills: 1 | Status: SHIPPED | OUTPATIENT
Start: 2023-12-11 | End: 2024-03-10

## 2023-12-11 NOTE — TELEPHONE ENCOUNTER
Medication Refill Request     Name of Medication Vyvanse  Dose/Frequency 30 mg/ Take 1 capsule by mouth every morning. Quantity 30  Verified pharmacy   [x]  Verified ordering Provider   [x]  Does patient have enough for the next 3 days? Yes [] No [x]  Does patient have a follow-up appointment scheduled?  Yes [x] No []   If so when is appointment: 1/9/2024

## 2023-12-11 NOTE — TELEPHONE ENCOUNTER
Patient called in regards to medication refill. Unable to verify name and dosage.  This writer called patient back and left voicemail to return phone call to get more information

## 2023-12-12 ENCOUNTER — TELEPHONE (OUTPATIENT)
Dept: PSYCHIATRY | Facility: CLINIC | Age: 33
End: 2023-12-12

## 2023-12-12 RX ORDER — LISDEXAMFETAMINE DIMESYLATE CAPSULES 30 MG/1
30 CAPSULE ORAL EVERY MORNING
Qty: 30 CAPSULE | Refills: 0 | Status: SHIPPED | OUTPATIENT
Start: 2023-12-12 | End: 2024-01-11

## 2023-12-12 RX ORDER — BUTALBITAL, ACETAMINOPHEN, CAFFEINE AND CODEINE PHOSPHATE 50; 325; 40; 30 MG/1; MG/1; MG/1; MG/1
1 CAPSULE ORAL EVERY 6 HOURS PRN
Qty: 15 CAPSULE | Refills: 0 | Status: SHIPPED | OUTPATIENT
Start: 2023-12-12

## 2023-12-12 RX ORDER — ONDANSETRON 4 MG/1
4 TABLET, FILM COATED ORAL EVERY 8 HOURS PRN
Qty: 20 TABLET | Refills: 0 | Status: SHIPPED | OUTPATIENT
Start: 2023-12-12

## 2023-12-12 NOTE — TELEPHONE ENCOUNTER
Spoke with Shan Rhodes, the pharmacist. Relayed DR Harman's message. He asked if the patient still has the Concerta (2 months worth in her possession) He stated that Is the problem. That is a lot of medication. Advised at this time I was not aware but assumed she would still have that, but it has been discontinued due to side effects. He stated he will call patient and confirm. Again, advised provider is authorizing fill of Vyvanse. He verbalized understanding.      Al Chow

## 2023-12-12 NOTE — TELEPHONE ENCOUNTER
Patient was trialed on Concerta ER and underwent dose adjustments. Patient was transitioned to Vyvanse on 12/08/23 due to side effects of Concerta. Vyvanse prescription okay to dispense at this time.     Thank you,  Lisa Kamara, DO

## 2023-12-12 NOTE — TELEPHONE ENCOUNTER
Received a call from Lopez lydia at [address]. He is looking to verify prescription for Vyvanse should be filled:  Shannan Rodriguez has gotten 2 previous prescriptions for methylphenidates in the past 30 days. [phone]    Please review for direction to authorization to fill or hold for now.

## 2023-12-30 DIAGNOSIS — G43.111 INTRACTABLE MIGRAINE WITH AURA WITH STATUS MIGRAINOSUS: ICD-10-CM

## 2024-01-02 RX ORDER — BUTALBITAL, ACETAMINOPHEN, CAFFEINE AND CODEINE PHOSPHATE 50; 325; 40; 30 MG/1; MG/1; MG/1; MG/1
1 CAPSULE ORAL EVERY 6 HOURS PRN
Qty: 15 CAPSULE | Refills: 0 | Status: SHIPPED | OUTPATIENT
Start: 2024-01-02

## 2024-01-07 DIAGNOSIS — F33.1 MODERATE EPISODE OF RECURRENT MAJOR DEPRESSIVE DISORDER (HCC): ICD-10-CM

## 2024-01-08 RX ORDER — SERTRALINE HYDROCHLORIDE 100 MG/1
100 TABLET, FILM COATED ORAL DAILY
Qty: 90 TABLET | Refills: 0 | Status: SHIPPED | OUTPATIENT
Start: 2024-01-08

## 2024-01-09 ENCOUNTER — OFFICE VISIT (OUTPATIENT)
Dept: PSYCHIATRY | Facility: CLINIC | Age: 34
End: 2024-01-09
Payer: COMMERCIAL

## 2024-01-09 VITALS — BODY MASS INDEX: 26.91 KG/M2 | WEIGHT: 142.4 LBS

## 2024-01-09 DIAGNOSIS — G47.00 INSOMNIA, UNSPECIFIED TYPE: ICD-10-CM

## 2024-01-09 DIAGNOSIS — F90.0 ADHD (ATTENTION DEFICIT HYPERACTIVITY DISORDER), INATTENTIVE TYPE: Primary | ICD-10-CM

## 2024-01-09 DIAGNOSIS — E55.9 VITAMIN D DEFICIENCY: ICD-10-CM

## 2024-01-09 DIAGNOSIS — F33.1 MODERATE EPISODE OF RECURRENT MAJOR DEPRESSIVE DISORDER (HCC): ICD-10-CM

## 2024-01-09 DIAGNOSIS — F41.9 ANXIETY DISORDER, UNSPECIFIED TYPE: ICD-10-CM

## 2024-01-09 PROCEDURE — 99213 OFFICE O/P EST LOW 20 MIN: CPT

## 2024-01-09 RX ORDER — LISDEXAMFETAMINE DIMESYLATE CAPSULES 30 MG/1
30 CAPSULE ORAL EVERY MORNING
Qty: 30 CAPSULE | Refills: 0 | Status: SHIPPED | OUTPATIENT
Start: 2024-01-09 | End: 2024-01-11 | Stop reason: SDUPTHER

## 2024-01-09 NOTE — BH TREATMENT PLAN
TREATMENT PLAN (Medication Management Only)        [facility]    Name and Date of Birth:  Lisa Zamora 33 y.o. 1990  Date of Treatment Plan: January 9, 2024  Diagnosis/Diagnoses:    1. ADHD (attention deficit hyperactivity disorder), inattentive type    2. Moderate episode of recurrent major depressive disorder (HCC)    3. Anxiety disorder, unspecified type    4. Insomnia, unspecified type    5. Vitamin D deficiency      Strengths/Personal Resources for Self-Care: supportive family, taking medications as prescribed, ability to communicate needs, ability to listen, ability to reason, ability to understand psychiatric illness, average or above intelligence, family ties, general fund of knowledge, good physical health, motivation for treatment, stable employment, work skills.  Area/Areas of need (in own words): anxiety symptoms, ADHD symptoms  1. Long Term Goal:  continue improvement in anxiety and ADHD symptoms, maintain control of depressive symptoms .  Target Date:6 months - 7/9/2024  Person/Persons responsible for completion of goal: Lisa/self and provider  2.  Short Term Objective (s) - How will we reach this goal?:   A. Provider new recommended medication/dosage changes and/or continue medication(s): continue current medications as prescribed Zoloft, Vyvanse.  B. Keep all scheduled appointments.  C.  Continue positive sleep hygiene habits .  D. Continue work towards establishing with psychotherapy and starting appointments.  Target Date:6 months - 7/9/2024  Person/Persons Responsible for Completion of Goal: Lisa/self  Progress Towards Goals: continuing treatment, improving  Treatment Modality: medication management every 8 weeks, referral for individual psychotherapy  Review due 180 days from date of this plan: 6 months - 7/9/2024  Expected length of service: ongoing treatment    My Physician and I have developed this plan together and I agree  to work on the goals and objectives. I understand the treatment goals that were developed for my treatment.  Sadaf Harman,   Psychiatry Resident, PGY-III

## 2024-01-09 NOTE — PATIENT INSTRUCTIONS
Continue Zoloft 100 mg daily at bedtime  Continue Vyvanse 30 mg daily in the afternoon (prior to work) - taking stimulant holidays as appropriate    Please call the office nursing staff for medication issues including refills, problems obtaining medications, side effects, etc at [phone].   Please return for a follow up appointment as discussed. If you are running late or are unable to attend your appointment, please call our [place] office at [phone].    If you are in psychological crisis including not limited to suicidal/homicidal thoughts or thoughts of self-injury, do not hesitate to call/contact your County Crisis hotline (see below), call [phone], call [phone] (mental health crisis), or go to the nearest emergency department.  [facility] Crisis: [phone]  [facility] Crisis: [phone]  [facility] Crisis: [phone]  [facility] Crisis: [phone]  [facility] Crisis: [phone]  [facility] Crisis: [phone]  [facility] Crisis: [phone]  National Suicide Prevention Hotline: 1-536.856.5762

## 2024-01-09 NOTE — PSYCH
Psychiatric Follow Up Visit - Behavioral Health       [facility]  MRN: [id number]  Assessment/Plan:   Lisa Zamora is a 32 y.o. 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 Cortes, Ph.D., with suicide risk factors including access to lethal means (guns), depression, and anxiety, with no prior suicide attempts or gestures, no prior inpatient psychiatric hospitalizations, who is presenting today for follow up. Patient was seen by this writer for transfer of care in 07/2023. Patient followed with [facility], Dr. Thomson, since 08/2021. In 07/2022, patient transitioned to a virtual psychiatric provider, [facility], and then re-established care with [facility] at 07/2023 appointment.  Today, the patient endorses improvement in focus and concentration related symptoms with transition to Vyvanse. Patient notes wearing off experience is not occurring with current stimulant medication as it did with Concerta ER. Patient is in agreement with continuation of Vyvanse at current doses, as well as continuation of Zoloft at current doses for anxiety and depressive symptoms. Patient remains agreeable to psychotherapy and is on waitlists.    Diagnoses:  ADHD, predominantly inattentive type, mild  Moderate episode of recurrent major depressive disorder, in partial remission  Anxiety disorder, unspecified, differential includes IGNACIO, Panic disorder, and PTSD  Insomnia, unspecified  Vitamin D deficiency    Treatment Recommendations/Precautions:  Continue Vyvanse 30 mg daily for ADHD related symptoms, with  stimulant holidays  PARQ completed including elevated heart rate, elevated bp, seizures, anxiety/irritability, activation/induction of zachary, abuse potential, interactions with other medications, risk of sudden death, appetite suppression/weight loss and other risks.   PDMP reviewed, last filled 12/12/23  Continue Zoloft 100 mg daily at bedtime for mood and anxiety  PARQ completed including serotonin syndrome, SIADH, worsening depression, suicidality, induction of zachary, GI upset, headaches, activation, sexual side effects, sedation, potential drug interactions, and others.  Patient reports discontinuation of Topamax 50 mg QHS by PCP for migraine  Recommend continued Vitamin D3 supplementation and monitoring levels  Referral for psychotherapy previously completed  Monitor BP, HR, weight, CBC w/diff, and CMP in setting of stimulant and SSRI prescriptions  Medication management every 8 weeks  Aware of need to follow up with family physician for medical issues  Aware of 24 hour and weekend coverage for urgent situations accessed by calling [facility] main practice number    Medication Risks/Benefits      Risks, Benefits And Possible Side Effects Of Medications:    Risks, benefits, and possible side effects of medications explained to Lisa and she verbalizes understanding and agreement for treatment.    Controlled Medication Discussion:     Lisa has been filling controlled prescriptions on time as prescribed according to [place] Prescription Drug Monitoring Program  Risks/benefits/alternativies to treatment discussed, including a myriad of potential adverse medication side effects: including elevated heart rate, elevated bp, seizures, anxiety/irritability, activation/induction of zachary, abuse potential, interactions with other medications, risk of sudden death, myocardial infarction, stroke, appetite suppression/weight loss and other risks. Lisa voiced understanding and consented to  "treatment.   ------------------------------------  History of Present Illness   Lisa Zamora is presenting to follow up for anxiety, depression, and focus problems. Lisa reports her mood and anxiety have improved, she notes feeling less irritable, and less anxiety overall including improved inner tension, restlessness, and negative thinking. Patient endorses 6-7 hours of sleep per night and is feeling rested in the morning. Patient notes if she is awoken overnight due to animals or other household need, she is able to fall back asleep. The patient endorses her appetite is good and unchanged compared to prior.  Patient reports improvement in focus, concentration, and distractibility since transition to Vyvanse 30 mg daily. She denies recurrence of wearing off/crash related symptoms as she experienced previously on Concerta ER, patient notes, \"I don't get a crash,\" and her sleep has improved. The patient notices at work she feels a difference accomplishing tasks and \"getting work down,\" in productive and timely way, she endorses improvement in distractibility and in managing interruptions at work.    The patient denies down, depressed mood, denies anhedonia, endorses improvement in motivation and energy levels. The patient does not endorse feelings of hopelessness or helplessness. The patient denies SI, HI, thoughts to harm self or others, or passive death wishes at time of evaluation.    The patient does not endorse or demonstrate symptoms consistent with zachary/hypomania, or negative or positive symptoms of psychosis at time of evaluation.    Psychiatric Review Of Systems:  Lisa reports Symptoms as described in HPI.  Lisa denies Current suicidal thoughts, plan, or intent, Current thoughts of self-harm, or Current homicidal thoughts, plan, or intent.    Medical Review Of Systems:  Complete review of systems is negative except as noted above.    ------------------------------------  All italicized " information has been copied from previous psychiatric evaluation. Information has been reviewed with the patient.     Past Psychiatric History:   Prior psychiatric diagnoses: MDD, Anxiety  Inpatient hospitalizations: patient denies  Suicide attempts/self-harm: patient denies  Violent/aggressive behavior: patient denies  Outpatient psychiatric providers: patient previously in the interim, patient has been seeing a virtual psychiatrist - [facility], prior to this was following with primary care provider  Past/current psychotherapy: patient denies current psychotherapy, patient previously in therapy 4 years ago   Other Services: patient denies  Psychiatric medication trial:   Multiple including  Antidepressants  Paroxetine (Paxil), Citalopram (Celexa), Duloxetine (Cymbalta), Sertraline (Zoloft), Wellbutrin, Trazodone (retrial in 2023 with minimal effectiveness)  Mood stabilizers  Lamotrigine (Lamictal), Topiramate (Topamax) - migraine  Sedative hypnotics  Xanax, Ativan  Others  BuSpar, Atarax     Substance Abuse History:  I have assessed this patient for substance use within the past 12 months.  Patient denies use of tobacco, alcohol, or illicit drugs. Patient did smoke cigarettes - less than 1 ppd previously.  Denies past legal actions or arrests secondary to substance intoxication. The patient denies prior DWIs/DUIs. Lisa does not exhibit objective evidence of substance withdrawal during today's examination nor does Lisa appear under the influence of any psychoactive substance.       Family Psychiatric History:   Mother: depression, anxiety  Sister: depression, anxiety, OCD     Social History  Early life/developmental: Denies a history of milestone/developmental delay. Denies a history of in-utero exposure to toxins/illicit substances.   Marital history: /Civil Union ,  in April (Evy)  Children: 1 stepdaughter, 10 y/o (Marisabel)  Living arrangement: Lives in a home with wife and stepdaughter, 2  "[place] shepherds and 1 cat.  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    Visit Vitals  OB Status Unknown   Smoking Status Former      Wt Readings from Last 6 Encounters:   11/20/23 63.9 kg (140 lb 14.4 oz)   06/13/23 62.2 kg (137 lb 3.2 oz)   12/23/22 61.7 kg (136 lb)   07/26/22 68.2 kg (150 lb 6.4 oz)   01/26/22 69.4 kg (153 lb)   12/23/21 70.5 kg (155 lb 6.4 oz)        Mental Status Exam:  Appearance:  alert, good eye contact, appears stated age, casually dressed, appropriate grooming and hygiene, and wearing glasses   Behavior:  calm, cooperative, and sitting comfortably   Motor: no abnormal movements and normal gait and balance   Speech:  spontaneous, normal rate, not pressured, normal volume, not hyperverbal, and coherent   Mood:  \"good\"   Affect:  mood-congruent, appropriate range, and brighter than previous   Thought Process:  Organized, logical, goal-directed   Thought Content: no verbalized delusions or overt paranoia   Perceptual disturbances: no reported hallucinations and does not appear to be responding to internal stimuli at this time   Risk Potential: No active or passive suicidal or homicidal " ideation was verbalized during interview   Cognition: oriented to person, place, time, and situation, memory grossly intact, appears to be of average intelligence, and age-appropriate attention span and concentration   Insight:  Good   Judgment: Good       Meds/Allergies    No Known Allergies  Current Outpatient Medications   Medication Instructions    butalbital-acetaminophen-caffeine-codeine (FIORICET WITH CODEINE) -92-30 MG per capsule 1 capsule, Oral, Every 6 hours PRN    cholecalciferol (VITAMIN D3) 1,000 units tablet TAKE 1 TABLET BY MOUTH EVERY DAY    cyclobenzaprine (FLEXERIL) 10 mg, Oral, 3 times daily PRN    drospirenone-ethinyl estradiol (Gianvi) 3-0.02 MG per tablet 1 tablet, Oral, Daily    lisdexamfetamine (VYVANSE) 30 mg, Oral, Every morning    ondansetron (ZOFRAN) 4 mg, Oral, Every 8 hours PRN    sertraline (ZOLOFT) 100 mg, Oral, Daily    topiramate (TOPAMAX) 50 MG tablet TAKE 1 TABLET BY MOUTH TWICE A DAY        Labs & Imaging:  I have personally reviewed all pertinent laboratory tests and imaging results.   No visits with results within 2 Month(s) from this visit.   Latest known visit with results is:   Admission on 03/26/2023, Discharged on 03/26/2023   Component Date Value Ref Range Status    WBC 03/26/2023 11.67 (H)  4.31 - 10.16 Thousand/uL Final    RBC 03/26/2023 3.90  3.81 - 5.12 Million/uL Final    Hemoglobin 03/26/2023 12.4  11.5 - 15.4 g/dL Final    Hematocrit 03/26/2023 38.0  34.8 - 46.1 % Final    MCV 03/26/2023 97  82 - 98 fL Final    MCH 03/26/2023 31.8  26.8 - 34.3 pg Final    MCHC 03/26/2023 32.6  31.4 - 37.4 g/dL Final    RDW 03/26/2023 12.3  11.6 - 15.1 % Final    MPV 03/26/2023 9.6  8.9 - 12.7 fL Final    Platelets 03/26/2023 313  149 - 390 Thousands/uL Final    nRBC 03/26/2023 0  /100 WBCs Final    Neutrophils Relative 03/26/2023 62  43 - 75 % Final    Immat GRANS % 03/26/2023 0  0 - 2 % Final    Lymphocytes Relative 03/26/2023 30  14 - 44 % Final    Monocytes Relative  03/26/2023 7  4 - 12 % Final    Eosinophils Relative 03/26/2023 1  0 - 6 % Final    Basophils Relative 03/26/2023 0  0 - 1 % Final    Neutrophils Absolute 03/26/2023 7.21  1.85 - 7.62 Thousands/µL Final    Immature Grans Absolute 03/26/2023 0.03  0.00 - 0.20 Thousand/uL Final    Lymphocytes Absolute 03/26/2023 3.50  0.60 - 4.47 Thousands/µL Final    Monocytes Absolute 03/26/2023 0.79  0.17 - 1.22 Thousand/µL Final    Eosinophils Absolute 03/26/2023 0.09  0.00 - 0.61 Thousand/µL Final    Basophils Absolute 03/26/2023 0.05  0.00 - 0.10 Thousands/µL Final    Sodium 03/26/2023 141  135 - 147 mmol/L Final    Potassium 03/26/2023 4.0  3.5 - 5.3 mmol/L Final    Chloride 03/26/2023 111 (H)  96 - 108 mmol/L Final    CO2 03/26/2023 23  21 - 32 mmol/L Final    ANION GAP 03/26/2023 7  4 - 13 mmol/L Final    BUN 03/26/2023 14  5 - 25 mg/dL Final    Creatinine 03/26/2023 0.83  0.60 - 1.30 mg/dL Final    Standardized to IDMS reference method    Glucose 03/26/2023 91  65 - 140 mg/dL Final    If the patient is fasting, the ADA then defines impaired fasting glucose as > 100 mg/dL and diabetes as > or equal to 123 mg/dL.    Calcium 03/26/2023 9.3  8.4 - 10.2 mg/dL Final    eGFR 03/26/2023 92  ml/min/1.73sq m Final    D-Dimer, Quant 03/26/2023 0.30  <0.50 ug/ml FEU Final    Reference and upper limits to exclude DVT and PE are the same.  Do not use to exclude if clinical symptoms are present.  Pregnant women:  1st trimester:  <0.22 - 1.06 ug/ml FEU  2nd trimester:  <0.22 - 1.88 ug/ml FEU  3rd trimester:   0.24 - 3.28 ug/ml FEU    Note: Normal ranges may not apply to patients who are transgender, non-binary, or whose legal sex, sex at birth, and gender identity differ.       ---------------------------------------    Although patient's acute lethality risk is low, long-term/chronic lethality risk is mildly elevated in the presence of anxiety, history of depression, work and home stressors, history of traumatic experiences, access to  firearms. At the current moment, Lisa is future-oriented, forward-thinking, and demonstrates ability to act in a self-preserving manner as evidenced by volitionally presenting to the clinic today, seeking treatment. At this juncture, inpatient hospitalization is not currently warranted. To mitigate future risk, patient should adhere to the recommendations of this writer, avoid alcohol/illicit substance use, utilize community-based resources and familiar support and prioritize mental health treatment.     Based on today's assessment and clinical criteria, Lisa Zamora contracts for safety and is not an imminent risk of harm to self or others. Outpatient level of care is deemed appropriate at this present time. Lisa understands that if they are no longer able to contract for safety, they need to call/contact the outpatient office including this writer, call/contact crisis and/orattend to the nearest Emergency Department for immediate evaluation.     Risk of Harm to Self:  The following ratings are based on assessment at the time of the interview  Demographic risk factors include:   Historical Risk Factors include: history of depressive symptoms, chronic anxiety symptoms, history of traumatic experiences  Recent Specific Risk Factors include: diagnosis of depression, current anxiety symptoms, worries about finances or work  Protective Factors: no current suicidal ideation, access to mental health treatment, being a parent, being , compliant with mental health treatment, having a desire to be alive, opportunities to participate in community, resiliency, responsibilities and duties to others, stable living environment, stable job, supportive family, supportive friends  Weapons: gun and handgun. The following steps have been taken to ensure weapons are properly secured: locked, secured, ammo stored separately   Based on today's assessment, Lisa presents the following risk of harm to self:  low     Risk of Harm to Others:  The following ratings are based on assessment at the time of the interview  Demographic Risk Factors include: under age 40.  Historical Risk Factors include: none.  Recent Specific Risk Factors include: concomitant mood disorder, multiple stressors.  Protective Factors: no current homicidal ideation, access to mental health treatment, being a parent, being , compliant with mental health treatment, opportunities to participate in community, resilience, responsibilities and duties to others, safe and stable living environment, supportive family, supportive friends  Weapons: gun and handgun. The following steps have been taken to ensure weapons are properly secured: locked, secured, ammo stored separately  Based on today's assessment, Lisa presents the following risk of harm to others: low     Psychotherapy Provided:     Individual psychotherapy provided: Yes  Medications, treatment progress and treatment plan reviewed with Lisa.  Medication education provided to Lisa.    Treatment Plan:    Completed and signed during the session: Yes - Treatment Plan done but not signed at time of office visit due to:  Plan reviewed by video and verbal consent given due to social distancing.    This note was shared with patient.    Visit Time:  Start Time: 0930  Stop Time: 0955  Total Visit Duration:  25 minutes    Sadaf Harman DO  Psychiatry Resident, PGY-III    This note has been constructed using a voice recognition system. There may be translation, syntax, or grammatical errors. If you have any questions, please contact the dictating provider.

## 2024-01-11 ENCOUNTER — TELEPHONE (OUTPATIENT)
Dept: FAMILY MEDICINE CLINIC | Facility: CLINIC | Age: 34
End: 2024-01-11

## 2024-01-11 DIAGNOSIS — F90.0 ADHD (ATTENTION DEFICIT HYPERACTIVITY DISORDER), INATTENTIVE TYPE: ICD-10-CM

## 2024-01-11 RX ORDER — LISDEXAMFETAMINE DIMESYLATE CAPSULES 30 MG/1
30 CAPSULE ORAL EVERY MORNING
Qty: 30 CAPSULE | Refills: 0 | Status: SHIPPED | OUTPATIENT
Start: 2024-01-11 | End: 2024-02-10

## 2024-01-11 NOTE — TELEPHONE ENCOUNTER
Medication Refill Request     Name of Medication Vyvanse  Dose/Frequency 30 mg/ Take 1 capsule by mouth every morning.  Quantity 30  Verified pharmacy   [x]  Verified ordering Provider   [x]  Does patient have enough for the next 3 days? Yes [] No [x]  Does patient have a follow-up appointment scheduled? Yes [x] No []   If so when is appointment: 3/11/2024

## 2024-01-15 ENCOUNTER — TELEPHONE (OUTPATIENT)
Dept: PSYCHIATRY | Facility: CLINIC | Age: 34
End: 2024-01-15

## 2024-01-16 NOTE — TELEPHONE ENCOUNTER
Completed PA for lisdexamfetamine 30 mg capsule via [employer].    Received the following info:   Patient not eligible ('does not have current coverage with the PBM/payer. Reimbursement requests - submit via other methods.' )     Will call pharmacy when they open at 10 am to obtain correct insurance info.

## 2024-01-16 NOTE — TELEPHONE ENCOUNTER
Completed PA for lisdexamfetamine 30 mg via [employer] and uploaded office notes.    Received instant approval:  Approved 01/01/2024-01/30/2025  CaseId:[id number]    Called [facility], pharmacist processed claim, it went through but co pay is $370.     Called Lisa, hank  to call office regarding PA.

## 2024-01-16 NOTE — TELEPHONE ENCOUNTER
Patient lvm requesting update on medication as it needed a prior authorization and pt received a message regarding insurance issue. Writer shared that nursing staff has completed the prior authorization and is calling pharmacy for insurance info. Patient would like a call after calling pharmacy for correct insurance info if possible.

## 2024-01-17 ENCOUNTER — TELEPHONE (OUTPATIENT)
Dept: PSYCHIATRY | Facility: CLINIC | Age: 34
End: 2024-01-17

## 2024-01-17 NOTE — TELEPHONE ENCOUNTER
Received a faxed request from [facility] for a refill of sertraline 100 mg tabs, #90.     [facility] is not in her preferred pharmacy list. Will need follow up with Lisa.

## 2024-01-17 NOTE — TELEPHONE ENCOUNTER
Left Lisa CHANG has been approved for  lisdexamfetamine 30 mg however, pharmacy states the co pay is $370.

## 2024-01-17 NOTE — TELEPHONE ENCOUNTER
Left Lisa message requesting call the office to advise if she would like prescription for sertraline 100 mg tabs, #90 sent to [facility]

## 2024-01-20 DIAGNOSIS — G43.111 INTRACTABLE MIGRAINE WITH AURA WITH STATUS MIGRAINOSUS: ICD-10-CM

## 2024-01-22 RX ORDER — BUTALBITAL, ACETAMINOPHEN, CAFFEINE AND CODEINE PHOSPHATE 50; 325; 40; 30 MG/1; MG/1; MG/1; MG/1
2 CAPSULE ORAL EVERY 6 HOURS PRN
Qty: 15 CAPSULE | Refills: 0 | Status: SHIPPED | OUTPATIENT
Start: 2024-01-22

## 2024-01-25 NOTE — TELEPHONE ENCOUNTER
Patient called and asked if we could do a prior auth on the brand Vyvanse.  Patient has called many pharmacy and all they have is brand.

## 2024-01-26 ENCOUNTER — TELEPHONE (OUTPATIENT)
Dept: FAMILY MEDICINE CLINIC | Facility: CLINIC | Age: 34
End: 2024-01-26

## 2024-01-26 ENCOUNTER — RA CDI HCC (OUTPATIENT)
Dept: OTHER | Facility: HOSPITAL | Age: 34
End: 2024-01-26

## 2024-01-26 NOTE — TELEPHONE ENCOUNTER
Called [facility]-[phone], spoke to PA Dept. Asking if PA can be done for Brand Vyvanse 30 mg.  PA on file is for generic and Brand.     Rep stated that the PA for Brand Vyvanse and the co pay is $370, because she has not met her deductible.       Called and spoke to Lisa, she was informed of the above info.  She stated she can not afford to pay $370. Is there something else she can take that will be more cost effective?

## 2024-02-01 DIAGNOSIS — F33.1 MODERATE EPISODE OF RECURRENT MAJOR DEPRESSIVE DISORDER (HCC): ICD-10-CM

## 2024-02-02 RX ORDER — SERTRALINE HYDROCHLORIDE 100 MG/1
100 TABLET, FILM COATED ORAL DAILY
Qty: 90 TABLET | Refills: 0 | Status: SHIPPED | OUTPATIENT
Start: 2024-02-02

## 2024-02-21 DIAGNOSIS — G43.111 INTRACTABLE MIGRAINE WITH AURA WITH STATUS MIGRAINOSUS: ICD-10-CM

## 2024-02-22 RX ORDER — ONDANSETRON 4 MG/1
TABLET, FILM COATED ORAL
Qty: 20 TABLET | Refills: 1 | Status: SHIPPED | OUTPATIENT
Start: 2024-02-22

## 2024-02-22 RX ORDER — BUTALBITAL, ACETAMINOPHEN, CAFFEINE AND CODEINE PHOSPHATE 50; 325; 40; 30 MG/1; MG/1; MG/1; MG/1
2 CAPSULE ORAL EVERY 6 HOURS PRN
Qty: 15 CAPSULE | Refills: 0 | OUTPATIENT
Start: 2024-02-22

## 2024-02-23 NOTE — TELEPHONE ENCOUNTER
LVM for pt to notify pt that her refill request for FIORICET WITH CODEINE was denied by her provider. This medication is a controlled substance it requires that she be seen every 6 months.

## 2024-02-26 NOTE — TELEPHONE ENCOUNTER
2nd attempt: LVM for pt to notify pt that her refill request for FIORICET WITH CODEINE was denied by her provider. This medication is a controlled substance it requires that she be seen every 6 months.

## 2024-02-28 NOTE — TELEPHONE ENCOUNTER
3rd attempt: LVM for pt to notify pt that her refill request for FIORICET WITH CODEINE was denied by her provider. This medication is a controlled substance it requires that she be seen every 6 months.      Sent letter and MyChart message

## 2024-03-11 ENCOUNTER — TELEPHONE (OUTPATIENT)
Dept: PSYCHIATRY | Facility: CLINIC | Age: 34
End: 2024-03-11

## 2024-03-11 NOTE — TELEPHONE ENCOUNTER
Patient is calling regarding cancelling an appointment.    Date/Time: 3/11/2024  at 9:15 am    Reason: no reason given    Patient was rescheduled: YES [] NO [x]  If yes, when was Patient reschedule for: n/a      Patient requesting call back to reschedule: YES [x] NO []

## 2024-03-12 ENCOUNTER — OFFICE VISIT (OUTPATIENT)
Dept: FAMILY MEDICINE CLINIC | Facility: CLINIC | Age: 34
End: 2024-03-12
Payer: COMMERCIAL

## 2024-03-12 VITALS
SYSTOLIC BLOOD PRESSURE: 124 MMHG | BODY MASS INDEX: 27.57 KG/M2 | TEMPERATURE: 98.1 F | OXYGEN SATURATION: 98 % | WEIGHT: 149.8 LBS | HEIGHT: 62 IN | HEART RATE: 116 BPM | DIASTOLIC BLOOD PRESSURE: 90 MMHG

## 2024-03-12 DIAGNOSIS — E61.1 IRON DEFICIENCY: ICD-10-CM

## 2024-03-12 DIAGNOSIS — F41.9 ANXIETY DISORDER, UNSPECIFIED TYPE: ICD-10-CM

## 2024-03-12 DIAGNOSIS — Z13.29 SCREENING FOR THYROID DISORDER: ICD-10-CM

## 2024-03-12 DIAGNOSIS — Z00.00 ANNUAL PHYSICAL EXAM: ICD-10-CM

## 2024-03-12 DIAGNOSIS — Z13.0 SCREENING FOR IRON DEFICIENCY ANEMIA: ICD-10-CM

## 2024-03-12 DIAGNOSIS — G44.229 CHRONIC TENSION-TYPE HEADACHE, NOT INTRACTABLE: ICD-10-CM

## 2024-03-12 DIAGNOSIS — Z23 ENCOUNTER FOR IMMUNIZATION: Primary | ICD-10-CM

## 2024-03-12 DIAGNOSIS — Z13.220 SCREENING FOR CHOLESTEROL LEVEL: ICD-10-CM

## 2024-03-12 DIAGNOSIS — Z13.1 SCREENING FOR DIABETES MELLITUS: ICD-10-CM

## 2024-03-12 DIAGNOSIS — F33.2 MODERATELY SEVERE RECURRENT MAJOR DEPRESSION (HCC): ICD-10-CM

## 2024-03-12 PROBLEM — F13.20 BENZODIAZEPINE DEPENDENCE (HCC): Status: RESOLVED | Noted: 2021-10-14 | Resolved: 2024-03-12

## 2024-03-12 PROCEDURE — 90471 IMMUNIZATION ADMIN: CPT

## 2024-03-12 PROCEDURE — 99214 OFFICE O/P EST MOD 30 MIN: CPT | Performed by: FAMILY MEDICINE

## 2024-03-12 PROCEDURE — 90715 TDAP VACCINE 7 YRS/> IM: CPT

## 2024-03-12 PROCEDURE — 99395 PREV VISIT EST AGE 18-39: CPT | Performed by: FAMILY MEDICINE

## 2024-03-12 RX ORDER — METHOCARBAMOL 750 MG/1
TABLET, FILM COATED ORAL
COMMUNITY
Start: 2024-02-22

## 2024-03-12 RX ORDER — NAPROXEN 500 MG/1
500 TABLET ORAL EVERY 12 HOURS PRN
COMMUNITY
Start: 2024-02-22 | End: 2025-02-21

## 2024-03-12 NOTE — PROGRESS NOTES
ADULT ANNUAL PHYSICAL  [facility]    NAME: Lisa Zamora  AGE: 33 y.o. SEX: female  : 1990     DATE: 3/12/2024     Assessment and Plan:     Problem List Items Addressed This Visit          Behavioral Health    Anxiety disorder    Relevant Orders    TSH, 3rd generation with Free T4 reflex    Moderately severe recurrent major depression (HCC)     Other Visit Diagnoses       Encounter for immunization    -  Primary    Relevant Orders    TDAP VACCINE GREATER THAN OR EQUAL TO 6YO IM (Completed)    Chronic tension-type headache, not intractable        Relevant Medications    naproxen (NAPROSYN) 500 mg tablet    methocarbamol (ROBAXIN) 750 mg tablet    rimegepant sulfate (NURTEC) 75 mg TBDP    Annual physical exam        BMI 27.0-27.9,adult        Screening for iron deficiency anemia        Relevant Orders    CBC    Screening for diabetes mellitus        Relevant Orders    Comprehensive metabolic panel    Hemoglobin A1C    Screening for cholesterol level        Relevant Orders    Lipid Panel with Direct LDL reflex    Screening for thyroid disorder        Relevant Orders    TSH, 3rd generation with Free T4 reflex    Iron deficiency        Relevant Orders    Iron Panel (Includes Ferritin, Iron Sat%, Iron, and TIBC)            Immunizations and preventive care screenings were discussed with patient today. Appropriate education was printed on patient's after visit summary.    Counseling:  Alcohol/drug use: discussed moderation in alcohol intake, the recommendations for healthy alcohol use, and avoidance of illicit drug use.  Dental Health: discussed importance of regular tooth brushing, flossing, and dental visits.  Injury prevention: discussed safety/seat belts, safety helmets, smoke detectors, carbon dioxide detectors, and smoking near bedding or upholstery.  Sexual health: discussed sexually transmitted diseases, partner selection, use of condoms,  avoidance of unintended pregnancy, and contraceptive alternatives.  Exercise: the importance of regular exercise/physical activity was discussed. Recommend exercise 3-5 times per week for at least 30 minutes.          Return in 1 year (on 3/12/2025).     Chief Complaint:     Chief Complaint   Patient presents with    Physical Exam      History of Present Illness:     Adult Annual Physical   Patient here for a comprehensive physical exam. The patient reports no problems.    Diet and Physical Activity  Diet/Nutrition: well balanced diet.   Exercise: no formal exercise.      Depression Screening  PHQ-2/9 Depression Screening           General Health  Sleep: sleeps well.   Hearing: normal - bilateral.  Vision: goes for regular eye exams.   Dental: regular dental visits.       /GYN Health  Follows with gynecology? yes   Last menstrual period: 2/10/24  Contraceptive method: oral contraceptives.  History of STDs?: no.           Review of Systems:     Review of Systems   Constitutional:  Negative for activity change, chills, fatigue and fever.   HENT:  Negative for congestion, ear pain, sinus pressure and sore throat.    Eyes:  Negative for redness, itching and visual disturbance.   Respiratory:  Negative for cough and shortness of breath.    Cardiovascular:  Negative for chest pain and palpitations.   Gastrointestinal:  Negative for abdominal pain, diarrhea and nausea.   Endocrine: Negative for cold intolerance and heat intolerance.   Genitourinary:  Negative for dysuria, flank pain and frequency.   Musculoskeletal:  Negative for arthralgias, back pain, gait problem and myalgias.   Skin:  Negative for color change.   Allergic/Immunologic: Negative for environmental allergies.   Neurological:  Negative for dizziness, numbness and headaches.   Psychiatric/Behavioral:  Negative for behavioral problems and sleep disturbance.       Past Medical History:     Past Medical History:   Diagnosis Date    Anxiety       Past Surgical  History:     Past Surgical History:   Procedure Laterality Date    WISDOM TOOTH EXTRACTION        Social History:     Social History     Socioeconomic History    Marital status: /Civil Union     Spouse name: None    Number of children: None    Years of education: None    Highest education level: None   Occupational History    None   Tobacco Use    Smoking status: Former     Current packs/day: 0.00     Types: Cigarettes     Start date:      Quit date:      Years since quittin.1    Smokeless tobacco: Never    Tobacco comments:     2 cigarettes daily   Vaping Use    Vaping status: Every Day    Substances: Nicotine   Substance and Sexual Activity    Alcohol use: Not Currently     Comment: Social    Drug use: No    Sexual activity: None   Other Topics Concern    None   Social History Narrative    Always uses seat belt    Daily caffeine consumption, 1 serving/day    Feels safe at home     Social Determinants of Health     Financial Resource Strain: Not on file   Food Insecurity: Not on file   Transportation Needs: Not on file   Physical Activity: Not on file   Stress: Not on file   Social Connections: Not on file   Intimate Partner Violence: Not on file   Housing Stability: Not on file      Family History:     Family History   Problem Relation Age of Onset    No Known Problems Mother     No Known Problems Sister     Melanoma Other         Unspecifed Grandmother    Liver cancer Maternal Grandmother     No Known Problems Paternal Grandmother     No Known Problems Paternal Grandfather     No Known Problems Maternal Aunt     Substance Abuse Neg Hx         Mother/Father    Mental illness Neg Hx         Mother/Father      Current Medications:     Current Outpatient Medications   Medication Sig Dispense Refill    amphetamine-dextroamphetamine (ADDERALL XR, 15MG,) 15 MG 24 hr capsule Take 1 capsule (15 mg total) by mouth every morning Taking stimulant holidays when appropriate Max Daily Amount: 15 mg 30 capsule  "0    butalbital-acetaminophen-caffeine-codeine (FIORICET WITH CODEINE) -51-30 MG per capsule Take 2 capsules by mouth every 6 (six) hours as needed for headaches 15 capsule 0    cholecalciferol (VITAMIN D3) 1,000 units tablet TAKE 1 TABLET BY MOUTH EVERY DAY 90 tablet 1    cyclobenzaprine (FLEXERIL) 10 mg tablet Take 1 tablet (10 mg total) by mouth 3 (three) times a day as needed for muscle spasms 60 tablet 0    drospirenone-ethinyl estradiol (Gianvi) 3-0.02 MG per tablet Take 1 tablet by mouth daily 90 tablet 1    methocarbamol (ROBAXIN) 750 mg tablet TAKE 1 TABLET (750 MG TOTAL) BY MOUTH 4 (FOUR) TIMES A DAY AS NEEDED FOR MUSCLE SPASMS.      naproxen (NAPROSYN) 500 mg tablet Take 500 mg by mouth every 12 (twelve) hours as needed      ondansetron (ZOFRAN) 4 mg tablet TAKE 1 TABLET BY MOUTH EVERY 8 HOURS AS NEEDED FOR NAUSEA AND VOMITING 20 tablet 1    rimegepant sulfate (NURTEC) 75 mg TBDP Take 1 tabet PO at onset of headache.  Max of 1 dose per day. 10 tablet 0    sertraline (ZOLOFT) 100 mg tablet Take 1 tablet (100 mg total) by mouth daily 90 tablet 0     No current facility-administered medications for this visit.      Allergies:     No Known Allergies   Physical Exam:     /90 (BP Location: Left arm, Patient Position: Sitting, Cuff Size: Adult)   Pulse (!) 116   Temp 98.1 °F (36.7 °C)   Ht 5' 1.5\" (1.562 m)   Wt 67.9 kg (149 lb 12.8 oz)   LMP 02/10/2024 (Approximate)   SpO2 98%   BMI 27.85 kg/m²     Physical Exam  Vitals reviewed.   Constitutional:       General: She is not in acute distress.     Appearance: She is well-developed. She is not diaphoretic.   HENT:      Head: Normocephalic and atraumatic.      Right Ear: External ear normal.      Left Ear: External ear normal.      Nose: Nose normal.   Eyes:      General: Lids are normal.         Right eye: No discharge.         Left eye: No discharge.      Extraocular Movements: Extraocular movements intact.      Conjunctiva/sclera: Conjunctivae " normal.      Pupils: Pupils are equal, round, and reactive to light.   Cardiovascular:      Rate and Rhythm: Normal rate and regular rhythm.      Pulses: Normal pulses.           Dorsalis pedis pulses are 2+ on the right side and 2+ on the left side.        Posterior tibial pulses are 2+ on the right side and 2+ on the left side.      Heart sounds: Normal heart sounds. No murmur heard.     No friction rub. No gallop.   Pulmonary:      Effort: Pulmonary effort is normal. No respiratory distress.      Breath sounds: Normal breath sounds. No wheezing or rales.   Abdominal:      General: Bowel sounds are normal. There is no distension.      Palpations: Abdomen is soft.      Tenderness: There is no abdominal tenderness. There is no guarding.   Musculoskeletal:         General: Normal range of motion.      Cervical back: Normal range of motion and neck supple.      Right lower leg: No edema.      Left lower leg: No edema.   Lymphadenopathy:      Cervical: No cervical adenopathy.   Skin:     General: Skin is warm and dry.      Capillary Refill: Capillary refill takes less than 2 seconds.      Findings: No erythema or rash.   Neurological:      Mental Status: She is alert and oriented to person, place, and time.      Cranial Nerves: No cranial nerve deficit.   Psychiatric:         Attention and Perception: Attention and perception normal.         Mood and Affect: Mood and affect normal.         Behavior: Behavior normal.         Thought Content: Thought content normal.         Cognition and Memory: Cognition and memory normal.         Judgment: Judgment normal.          Amadeo Sadler DO   [facility]

## 2024-03-13 NOTE — PROGRESS NOTES
Assessment/Plan:   1. Chronic tension-type headache, not intractable  Headaches have been persisting.  At this time, we will continue the Fioricet.  She may benefit from magnesium oxide.  Will start treatment with Nurtec.  Follow-up if any symptoms persist.  - rimegepant sulfate (NURTEC) 75 mg TBDP; Take 1 tabet PO at onset of headache.  Max of 1 dose per day.  Dispense: 10 tablet; Refill: 0    2. Anxiety disorder, unspecified type  Stable.  She follows with psychiatry regularly.  Continue with current treatment sertraline, as well as her Adderall.  - TSH, 3rd generation with Free T4 reflex; Future    3. Encounter for immunization  - TDAP VACCINE GREATER THAN OR EQUAL TO 8YO IM             Diagnoses and all orders for this visit:    Encounter for immunization  -     TDAP VACCINE GREATER THAN OR EQUAL TO 8YO IM    Chronic tension-type headache, not intractable  -     rimegepant sulfate (NURTEC) 75 mg TBDP; Take 1 tabet PO at onset of headache.  Max of 1 dose per day.    Anxiety disorder, unspecified type  -     TSH, 3rd generation with Free T4 reflex; Future    Annual physical exam    BMI 27.0-27.9,adult    Moderately severe recurrent major depression (HCC)    Screening for iron deficiency anemia  -     CBC; Future    Screening for diabetes mellitus  -     Comprehensive metabolic panel; Future  -     Hemoglobin A1C; Future    Screening for cholesterol level  -     Lipid Panel with Direct LDL reflex; Future    Screening for thyroid disorder  -     TSH, 3rd generation with Free T4 reflex; Future    Iron deficiency  -     Iron Panel (Includes Ferritin, Iron Sat%, Iron, and TIBC); Future    Other orders  -     naproxen (NAPROSYN) 500 mg tablet; Take 500 mg by mouth every 12 (twelve) hours as needed  -     methocarbamol (ROBAXIN) 750 mg tablet; TAKE 1 TABLET (750 MG TOTAL) BY MOUTH 4 (FOUR) TIMES A DAY AS NEEDED FOR MUSCLE SPASMS.          Subjective:       Chief Complaint   Patient presents with    Physical Exam       Patient ID: Lisa Zamora is a 33 y.o. female presents today for a follow-up on her chronic conditions.  She has chronic tension type headaches, anxiety with depression.  She has been taking medications regularly.  She denies adverse reactions with her medications.  She will follow-up with her [employer] orthopedist secondary to her cervical radiculopathy.    HPI    Review of Systems   Constitutional:  Negative for activity change, chills, fatigue and fever.   HENT:  Negative for congestion, ear pain, sinus pressure and sore throat.    Eyes:  Negative for redness, itching and visual disturbance.   Respiratory:  Negative for cough and shortness of breath.    Cardiovascular:  Negative for chest pain and palpitations.   Gastrointestinal:  Negative for abdominal pain, diarrhea and nausea.   Endocrine: Negative for cold intolerance and heat intolerance.   Genitourinary:  Negative for dysuria, flank pain and frequency.   Musculoskeletal:  Negative for arthralgias, back pain, gait problem and myalgias.   Skin:  Negative for color change.   Allergic/Immunologic: Negative for environmental allergies.   Neurological:  Negative for dizziness, numbness and headaches.   Psychiatric/Behavioral:  Negative for behavioral problems and sleep disturbance.        The following portions of the patient's history were reviewed and updated as appropriate : past family history, past medical history, past social history and past surgical history.    Current Outpatient Medications:     amphetamine-dextroamphetamine (ADDERALL XR, 15MG,) 15 MG 24 hr capsule, Take 1 capsule (15 mg total) by mouth every morning Taking stimulant holidays when appropriate Max Daily Amount: 15 mg, Disp: 30 capsule, Rfl: 0    butalbital-acetaminophen-caffeine-codeine (FIORICET WITH CODEINE) -02-30 MG per capsule, Take 2 capsules by mouth every 6 (six) hours as needed for headaches, Disp: 15 capsule, Rfl: 0    cholecalciferol (VITAMIN D3) 1,000  "units tablet, TAKE 1 TABLET BY MOUTH EVERY DAY, Disp: 90 tablet, Rfl: 1    cyclobenzaprine (FLEXERIL) 10 mg tablet, Take 1 tablet (10 mg total) by mouth 3 (three) times a day as needed for muscle spasms, Disp: 60 tablet, Rfl: 0    drospirenone-ethinyl estradiol (Gianvi) 3-0.02 MG per tablet, Take 1 tablet by mouth daily, Disp: 90 tablet, Rfl: 1    methocarbamol (ROBAXIN) 750 mg tablet, TAKE 1 TABLET (750 MG TOTAL) BY MOUTH 4 (FOUR) TIMES A DAY AS NEEDED FOR MUSCLE SPASMS., Disp: , Rfl:     naproxen (NAPROSYN) 500 mg tablet, Take 500 mg by mouth every 12 (twelve) hours as needed, Disp: , Rfl:     ondansetron (ZOFRAN) 4 mg tablet, TAKE 1 TABLET BY MOUTH EVERY 8 HOURS AS NEEDED FOR NAUSEA AND VOMITING, Disp: 20 tablet, Rfl: 1    rimegepant sulfate (NURTEC) 75 mg TBDP, Take 1 tabet PO at onset of headache.  Max of 1 dose per day., Disp: 10 tablet, Rfl: 0    sertraline (ZOLOFT) 100 mg tablet, Take 1 tablet (100 mg total) by mouth daily, Disp: 90 tablet, Rfl: 0         Objective:         Vitals:    03/12/24 1428   BP: 124/90   BP Location: Left arm   Patient Position: Sitting   Cuff Size: Adult   Pulse: (!) 116   Temp: 98.1 °F (36.7 °C)   SpO2: 98%   Weight: 67.9 kg (149 lb 12.8 oz)   Height: 5' 1.5\" (1.562 m)     Physical Exam  Vitals reviewed.   Constitutional:       General: She is not in acute distress.     Appearance: Normal appearance. She is well-developed.   HENT:      Head: Normocephalic and atraumatic.      Right Ear: Tympanic membrane, ear canal and external ear normal. There is no impacted cerumen.      Left Ear: Tympanic membrane, ear canal and external ear normal. There is no impacted cerumen.      Nose: Nose normal. No congestion or rhinorrhea.      Mouth/Throat:      Mouth: Mucous membranes are moist.      Pharynx: No oropharyngeal exudate or posterior oropharyngeal erythema.   Eyes:      General: No scleral icterus.        Right eye: No discharge.         Left eye: No discharge.      Extraocular " Movements: Extraocular movements intact.      Conjunctiva/sclera: Conjunctivae normal.      Pupils: Pupils are equal, round, and reactive to light.   Neck:      Trachea: No tracheal deviation.   Cardiovascular:      Rate and Rhythm: Normal rate and regular rhythm.      Pulses: Normal pulses.           Dorsalis pedis pulses are 2+ on the right side and 2+ on the left side.        Posterior tibial pulses are 2+ on the right side and 2+ on the left side.      Heart sounds: Normal heart sounds. No murmur heard.     No friction rub. No gallop.   Pulmonary:      Effort: Pulmonary effort is normal. No respiratory distress.      Breath sounds: Normal breath sounds. No wheezing, rhonchi or rales.   Abdominal:      General: Bowel sounds are normal. There is no distension.      Palpations: Abdomen is soft.      Tenderness: There is no abdominal tenderness. There is no guarding or rebound.   Musculoskeletal:         General: Normal range of motion.      Cervical back: Normal range of motion and neck supple.      Right lower leg: No edema.      Left lower leg: No edema.   Lymphadenopathy:      Head:      Right side of head: No submental or submandibular adenopathy.      Left side of head: No submental or submandibular adenopathy.      Cervical: No cervical adenopathy.      Right cervical: No superficial, deep or posterior cervical adenopathy.     Left cervical: No superficial, deep or posterior cervical adenopathy.   Skin:     General: Skin is warm and dry.      Findings: No erythema.   Neurological:      General: No focal deficit present.      Mental Status: She is alert and oriented to person, place, and time.      Cranial Nerves: No cranial nerve deficit.      Sensory: Sensation is intact. No sensory deficit.      Motor: Motor function is intact.   Psychiatric:         Attention and Perception: Attention and perception normal.         Mood and Affect: Mood is not anxious or depressed.         Speech: Speech normal.          Behavior: Behavior normal.         Thought Content: Thought content normal.         Judgment: Judgment normal.

## 2024-03-14 ENCOUNTER — TELEPHONE (OUTPATIENT)
Dept: FAMILY MEDICINE CLINIC | Facility: CLINIC | Age: 34
End: 2024-03-14

## 2024-03-27 DIAGNOSIS — F90.0 ADHD (ATTENTION DEFICIT HYPERACTIVITY DISORDER), INATTENTIVE TYPE: ICD-10-CM

## 2024-03-27 RX ORDER — DEXTROAMPHETAMINE SACCHARATE, AMPHETAMINE ASPARTATE MONOHYDRATE, DEXTROAMPHETAMINE SULFATE AND AMPHETAMINE SULFATE 3.75; 3.75; 3.75; 3.75 MG/1; MG/1; MG/1; MG/1
15 CAPSULE, EXTENDED RELEASE ORAL EVERY MORNING
Qty: 30 CAPSULE | Refills: 0 | Status: SHIPPED | OUTPATIENT
Start: 2024-03-27 | End: 2024-04-26

## 2024-04-04 ENCOUNTER — VBI (OUTPATIENT)
Dept: ADMINISTRATIVE | Facility: OTHER | Age: 34
End: 2024-04-04

## 2024-04-08 NOTE — PSYCH
Psychiatric Follow Up Visit - Behavioral Health       [facility]  MRN: [id number]    Assessment/Plan:   Lisa Zamora is a 32 y.o. female, /Civil Union and presently living with wife and stepdaughter, employed in long term system as lieutenant, with past medical history significant for migraines, with a past psychiatric history significant for depression, anxiety, and insomnia, and recent diagnosis in 10/2023 of mild ADHD, predominantly inattentive type diagnosed through neuropsychological testing with [facility] - Dr. Cortes, Ph.D., with suicide risk factors including access to lethal means (guns), depression, and anxiety, with no prior suicide attempts or gestures, no prior inpatient psychiatric hospitalizations, who is presenting today for follow up. Patient was seen by this writer for transfer of care in 07/2023. Patient followed with [facility], Dr. Thomson, since 08/2021. In 07/2022, patient transitioned to a virtual psychiatric provider, [facility], and then re-established care with [facility] at 07/2023 appointment.  Today, the patient endorses mood and anxiety symptoms are stable. She reports feeling depressive symptoms have improved without recurrence since prior evaluation.   Patient endorses anxiety symptoms have improved in setting of stimulant treatment for ADHD related symptoms.  Since the prior evaluation, patient was unable to continue on Vyvanse due to national shortage of medication and insurance/cost barriers to fill brand name of prescription. Patient was transitioned to Adderall XR 15 mg daily.  Today, the patient endorses anxiety and depressive symptoms are stable. She endorses improvement in inner tension, restlessness, and negative thinking. She endorses good sleep and appetite. The patient is in agreement with continuing medications at this time, including Zoloft 100 mg daily and with plan to increase  Adderall XR to 20 mg daily if Vyvnase 30 mg daily is not obtainable due to national shortage and insurance coverage.  Patient is on waitlists to establish with psychotherapy.    Diagnoses:  ADHD, predominantly inattentive type, mild  Mild episode of recurrent major depressive disorder, in partial remission  Anxiety disorder, unspecified, differential includes IGNACIO, Panic disorder, and PTSD  Insomnia, unspecified - at goal    Treatment Recommendations/Precautions:  Patient was transitioned to Adderall XR 15 mg daily from Vyvanse 30 mg due to national shortage   Patient endorses ADHD related symptoms have been manageable, patient does not reduced response to Adderall XR compared to Vyvanse and has noted a wearing off of medication.  Discussed options to review insurance coverage for brand name of Vyanse 30 mg daily in setting of improved response on medication, if cost continues to be barrier, recommend increase to Adderall XR 20 mg daily  PDMP reviewed, Adderall XR last filled 03/27/2024  PARQ completed including elevated heart rate, elevated bp, seizures, anxiety/irritability, activation/induction of zachary, abuse potential, interactions with other medications, risk of sudden death, appetite suppression/weight loss and other risks.   Continue Zoloft 100 mg daily at bedtime for mood and anxiety  PARQ completed including serotonin syndrome, SIADH, worsening depression, suicidality, induction of zachary, GI upset, headaches, activation, sexual side effects, sedation, potential drug interactions, and others.  Recommend continued Vitamin D3 supplementation and monitoring levels  Referral for psychotherapy previously completed  Monitor BP, HR, weight, CBC w/diff, and CMP in setting of stimulant and SSRI prescriptions  BP and HR WNL in office today  Medication management every 8-12 weeks  Aware of need to follow up with family physician for medical issues  Aware of 24 hour and weekend coverage for urgent situations accessed by  "calling [facility] main practice number    Medication Risks/Benefits      Risks, Benefits And Possible Side Effects Of Medications:    Risks, benefits, and possible side effects of medications explained to Lisa and she verbalizes understanding and agreement for treatment.    Controlled Medication Discussion:     Lisa has been filling controlled prescriptions on time as prescribed according to [place] Prescription Drug Monitoring Program  ------------------------------------  History of Present Illness   Lisa Zamora is presenting to follow up for anxiety, depression, and focus problems. Lisa reports her mood is, \"good,\" she states, \"I feel this combination has been very helpful,\" in regard to current medications. The patient endorses sleep has improved, and notes a stable appetite. The patient endorses good motivation and energy at this time. The patient does not endorse feelings of hopelessness or helplessness. The patient denies SI, HI, thoughts to harm self or others, or passive death wishes at time of evaluation.    The patient endorses anxiety has improved - denies increases in negative thinking, endorses improvement in inner tension and restlessness. The patient endorses focus, concentration, and distractibility have improved on stimulant medication. She has experienced mild wearing off when transitioned to Adderall XR from Vyvanse, though overall symptoms are managed at this time.    The patient does not endorse or demonstrate symptoms consistent with zachary/hypomania, or negative or positive symptoms of psychosis at time of evaluation.    Discussed national Vyvanse shortage and ongoing difficulty obtaining generic formulation of medication. This provider continued pharmacies in patient's area, generic unavailable. Discussed option to increase Adderall XR to 20 mg daily with plan to transition back to Vyvanse 30 mg daily when medication more obtainable, patient in " agreement with plan.    Psychiatric Review Of Systems:  Lisa reports Symptoms as described in HPI.  Lisa denies Current suicidal thoughts, plan, or intent, Current thoughts of self-harm, or Current homicidal thoughts, plan, or intent.    Medical Review Of Systems:  Complete review of systems is negative except as noted above.    ------------------------------------  All italicized information has been copied from previous psychiatric evaluation. Information has been reviewed with the patient.     Past Psychiatric History:   Prior psychiatric diagnoses: MDD, Anxiety  Inpatient hospitalizations: patient denies  Suicide attempts/self-harm: patient denies  Violent/aggressive behavior: patient denies  Outpatient psychiatric providers: patient previously in the interim, patient has been seeing a virtual psychiatrist - [facility], prior to this was following with primary care provider  Past/current psychotherapy: patient denies current psychotherapy, patient previously in therapy 4 years ago   Other Services: patient denies  Psychiatric medication trial:   Multiple including  Antidepressants  Paroxetine (Paxil), Citalopram (Celexa), Duloxetine (Cymbalta), Sertraline (Zoloft), Wellbutrin, Trazodone (retrial in 2023 with minimal effectiveness)  Mood stabilizers  Lamotrigine (Lamictal), Topiramate (Topamax) - migraine  Sedative hypnotics  Xanax, Ativan  Others  BuSpar, Atarax     Substance Abuse History:  I have assessed this patient for substance use within the past 12 months.  Patient denies use of tobacco, alcohol, or illicit drugs. Patient did smoke cigarettes - less than 1 ppd previously.  Denies past legal actions or arrests secondary to substance intoxication. The patient denies prior DWIs/DUIs. Lisa does not exhibit objective evidence of substance withdrawal during today's examination nor does Lisa appear under the influence of any psychoactive substance.       Family Psychiatric History:   Mother:  depression, anxiety  Sister: depression, anxiety, OCD     Social History  Early life/developmental: Denies a history of milestone/developmental delay. Denies a history of in-utero exposure to toxins/illicit substances.   Marital history: /Civil Union ,  in April (Evy)  Children: 1 stepdaughter, 10 y/o (Marisabel)  Living arrangement: Lives in a home with wife and stepdaughter, 2 Serbian shepherds and 1 cat.  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    Visit Vitals  LMP 02/10/2024 (Approximate)   OB Status Unknown   Smoking Status Former      Wt Readings from Last 6 Encounters:   03/12/24 67.9 kg (149 lb 12.8 oz)   01/09/24 64.6 kg (142 lb 6.4 oz)   11/20/23 63.9 kg (140 lb 14.4 oz)   06/13/23 62.2 kg (137 lb 3.2 oz)   12/23/22 61.7 kg (136 lb)   07/26/22 68.2 kg (150 lb 6.4 oz)        Mental Status Exam:  Appearance:  alert, good eye contact, appears stated age, casually dressed, appropriate grooming and hygiene, and wearing glasses   Behavior:  calm, cooperative, and sitting comfortably   Motor: no abnormal movements and normal gait and balance   Speech:  spontaneous, clear, normal rate, not  "pressured, normal volume, not hyperverbal, and coherent   Mood:  \"good\"   Affect:  mood-congruent and appropriate range   Thought Process:  Organized, logical, goal-directed   Thought Content: no verbalized delusions or overt paranoia   Perceptual disturbances: no reported hallucinations and does not appear to be responding to internal stimuli at this time   Risk Potential: No active or passive suicidal or homicidal ideation was verbalized during interview   Cognition: oriented to person, place, time, and situation, memory grossly intact, appears to be of average intelligence, and age-appropriate attention span and concentration   Insight:  Good   Judgment: Good       Meds/Allergies    No Known Allergies  Current Outpatient Medications   Medication Instructions    amphetamine-dextroamphetamine (ADDERALL XR, 15MG,) 15 MG 24 hr capsule 15 mg, Oral, Every morning, Taking stimulant holidays when appropriate    butalbital-acetaminophen-caffeine-codeine (FIORICET WITH CODEINE) -34-30 MG per capsule 2 capsules, Oral, Every 6 hours PRN    cholecalciferol (VITAMIN D3) 1,000 units tablet TAKE 1 TABLET BY MOUTH EVERY DAY    cyclobenzaprine (FLEXERIL) 10 mg, Oral, 3 times daily PRN    drospirenone-ethinyl estradiol (Gianvi) 3-0.02 MG per tablet 1 tablet, Oral, Daily    methocarbamol (ROBAXIN) 750 mg tablet TAKE 1 TABLET (750 MG TOTAL) BY MOUTH 4 (FOUR) TIMES A DAY AS NEEDED FOR MUSCLE SPASMS.    naproxen (NAPROSYN) 500 mg, Oral, Every 12 hours PRN    ondansetron (ZOFRAN) 4 mg tablet TAKE 1 TABLET BY MOUTH EVERY 8 HOURS AS NEEDED FOR NAUSEA AND VOMITING    rimegepant sulfate (NURTEC) 75 mg TBDP Take 1 tabet PO at onset of headache.  Max of 1 dose per day.    sertraline (ZOLOFT) 100 mg, Oral, Daily        Labs & Imaging:  I have personally reviewed all pertinent laboratory tests and imaging results.   No visits with results within 2 Month(s) from this visit.   Latest known visit with results is:   Admission on 03/26/2023, " Discharged on 03/26/2023   Component Date Value Ref Range Status    WBC 03/26/2023 11.67 (H)  4.31 - 10.16 Thousand/uL Final    RBC 03/26/2023 3.90  3.81 - 5.12 Million/uL Final    Hemoglobin 03/26/2023 12.4  11.5 - 15.4 g/dL Final    Hematocrit 03/26/2023 38.0  34.8 - 46.1 % Final    MCV 03/26/2023 97  82 - 98 fL Final    MCH 03/26/2023 31.8  26.8 - 34.3 pg Final    MCHC 03/26/2023 32.6  31.4 - 37.4 g/dL Final    RDW 03/26/2023 12.3  11.6 - 15.1 % Final    MPV 03/26/2023 9.6  8.9 - 12.7 fL Final    Platelets 03/26/2023 313  149 - 390 Thousands/uL Final    nRBC 03/26/2023 0  /100 WBCs Final    Neutrophils Relative 03/26/2023 62  43 - 75 % Final    Immature Grans % 03/26/2023 0  0 - 2 % Final    Lymphocytes Relative 03/26/2023 30  14 - 44 % Final    Monocytes Relative 03/26/2023 7  4 - 12 % Final    Eosinophils Relative 03/26/2023 1  0 - 6 % Final    Basophils Relative 03/26/2023 0  0 - 1 % Final    Neutrophils Absolute 03/26/2023 7.21  1.85 - 7.62 Thousands/µL Final    Absolute Immature Grans 03/26/2023 0.03  0.00 - 0.20 Thousand/uL Final    Absolute Lymphocytes 03/26/2023 3.50  0.60 - 4.47 Thousands/µL Final    Absolute Monocytes 03/26/2023 0.79  0.17 - 1.22 Thousand/µL Final    Eosinophils Absolute 03/26/2023 0.09  0.00 - 0.61 Thousand/µL Final    Basophils Absolute 03/26/2023 0.05  0.00 - 0.10 Thousands/µL Final    Sodium 03/26/2023 141  135 - 147 mmol/L Final    Potassium 03/26/2023 4.0  3.5 - 5.3 mmol/L Final    Chloride 03/26/2023 111 (H)  96 - 108 mmol/L Final    CO2 03/26/2023 23  21 - 32 mmol/L Final    ANION GAP 03/26/2023 7  4 - 13 mmol/L Final    BUN 03/26/2023 14  5 - 25 mg/dL Final    Creatinine 03/26/2023 0.83  0.60 - 1.30 mg/dL Final    Standardized to IDMS reference method    Glucose 03/26/2023 91  65 - 140 mg/dL Final    If the patient is fasting, the ADA then defines impaired fasting glucose as > 100 mg/dL and diabetes as > or equal to 123 mg/dL.    Calcium 03/26/2023 9.3  8.4 - 10.2 mg/dL Final     eGFR 03/26/2023 92  ml/min/1.73sq m Final    D-Dimer, Quant 03/26/2023 0.30  <0.50 ug/ml FEU Final    Reference and upper limits to exclude DVT and PE are the same.  Do not use to exclude if clinical symptoms are present.  Pregnant women:  1st trimester:  <0.22 - 1.06 ug/ml FEU  2nd trimester:  <0.22 - 1.88 ug/ml FEU  3rd trimester:   0.24 - 3.28 ug/ml FEU    Note: Normal ranges may not apply to patients who are transgender, non-binary, or whose legal sex, sex at birth, and gender identity differ.       ---------------------------------------    Although patient's acute lethality risk is low, long-term/chronic lethality risk is mildly elevated in the presence of anxiety, history of depression, work and home stressors, history of traumatic experiences, access to firearms. At the current moment, Lisa is future-oriented, forward-thinking, and demonstrates ability to act in a self-preserving manner as evidenced by volitionally presenting to the clinic today, seeking treatment. At this juncture, inpatient hospitalization is not currently warranted. To mitigate future risk, patient should adhere to the recommendations of this writer, avoid alcohol/illicit substance use, utilize community-based resources and familiar support and prioritize mental health treatment.     Based on today's assessment and clinical criteria, Lisa Zamora contracts for safety and is not an imminent risk of harm to self or others. Outpatient level of care is deemed appropriate at this present time. Lisa understands that if they are no longer able to contract for safety, they need to call/contact the outpatient office including this writer, call/contact crisis and/orattend to the nearest Emergency Department for immediate evaluation.     Risk of Harm to Self:  The following ratings are based on assessment at the time of the interview  Demographic risk factors include:   Historical Risk Factors include: history of depressive  symptoms, chronic anxiety symptoms, history of traumatic experiences  Recent Specific Risk Factors include: diagnosis of depression, current anxiety symptoms, worries about finances or work  Protective Factors: no current suicidal ideation, access to mental health treatment, being a parent, being , compliant with mental health treatment, having a desire to be alive, opportunities to participate in community, resiliency, responsibilities and duties to others, stable living environment, stable job, supportive family, supportive friends  Weapons: gun and handgun. The following steps have been taken to ensure weapons are properly secured: locked, secured, ammo stored separately   Based on today's assessment, Lisa presents the following risk of harm to self: low     Risk of Harm to Others:  The following ratings are based on assessment at the time of the interview  Demographic Risk Factors include: under age 40.  Historical Risk Factors include: none.  Recent Specific Risk Factors include: concomitant mood disorder, multiple stressors.  Protective Factors: no current homicidal ideation, access to mental health treatment, being a parent, being , compliant with mental health treatment, opportunities to participate in community, resilience, responsibilities and duties to others, safe and stable living environment, supportive family, supportive friends  Weapons: gun and handgun. The following steps have been taken to ensure weapons are properly secured: locked, secured, ammo stored separately  Based on today's assessment, Lisa presents the following risk of harm to others: low    Psychotherapy Provided:     Individual psychotherapy provided: Yes  Counseling was provided during the session today for 15 minutes.  Medications, treatment progress and treatment plan reviewed with Lisa.    Treatment Plan:    Completed and signed during the session: Not applicable - Treatment Plan not due at this  session    This note was shared with patient.    Visit Time:  Start Time: 1005  Stop Time: 1040  Total Visit Duration:  35 minutes    Sadaf Haramn DO  Psychiatry Resident, PGY-III    This note has been constructed using a voice recognition system. There may be translation, syntax, or grammatical errors. If you have any questions, please contact the dictating provider.

## 2024-04-11 ENCOUNTER — OFFICE VISIT (OUTPATIENT)
Dept: PSYCHIATRY | Facility: CLINIC | Age: 34
End: 2024-04-11

## 2024-04-11 VITALS — HEART RATE: 100 BPM

## 2024-04-11 DIAGNOSIS — F90.0 ADHD (ATTENTION DEFICIT HYPERACTIVITY DISORDER), INATTENTIVE TYPE: Primary | ICD-10-CM

## 2024-04-11 DIAGNOSIS — F33.0 MILD EPISODE OF RECURRENT MAJOR DEPRESSIVE DISORDER (HCC): ICD-10-CM

## 2024-04-11 DIAGNOSIS — G47.00 INSOMNIA, UNSPECIFIED TYPE: ICD-10-CM

## 2024-04-11 DIAGNOSIS — F41.9 ANXIETY DISORDER, UNSPECIFIED TYPE: ICD-10-CM

## 2024-04-11 RX ORDER — DEXTROAMPHETAMINE SACCHARATE, AMPHETAMINE ASPARTATE MONOHYDRATE, DEXTROAMPHETAMINE SULFATE AND AMPHETAMINE SULFATE 5; 5; 5; 5 MG/1; MG/1; MG/1; MG/1
20 CAPSULE, EXTENDED RELEASE ORAL EVERY MORNING
Qty: 30 CAPSULE | Refills: 0 | Status: SHIPPED | OUTPATIENT
Start: 2024-04-11 | End: 2024-05-11

## 2024-04-11 NOTE — BH CRISIS PLAN
Client Name: Lisa Zamora       Client YOB: 1990    [place] Safety Plan      Creation Date: 4/11/24 Update Date: 4/11/24      Step 1: Warning Signs:   Warning Signs   Increased depressed mood   Isolating self            Step 2: Internal Coping Strategies:   Internal Coping Strategies   Go to the gym/physical activity   Spend time in nature   Spend time with family and pets            Step 3: People and social settings that provide distraction:   Name Contact Information   Evy (wife) number in cell phone   Mother number in cell phone    Places   Home   Mother's home           Step 4: People whom I can ask for help during a crisis:      Name Contact Information    Evy (wife) number in cell phone    Mother number in cell phone      Step 5: Professionals or agencies I can contact during a crisis:      Clinican/Agency Name Phone Emergency Contact    [facility] [phone]     Emergency services 911     [place] services 988       Local Emergency Department Emergency Department Phone Emergency Department Address    [place] [address]         Crisis Phone Numbers:   Suicide Prevention Lifeline: Call or Text  988 Crisis Text Line: Text HOME to 606-441   Please note: Some [facility] do not have a separate number for Child/Adolescent specific crisis. If your county is not listed under Child/Adolescent, please call the adult number for your county      Adult Crisis Numbers: Child/Adolescent Crisis Numbers   [facility]: [phone] [facility]: [phone]   [facility]: [phone] [facility]: [phone]   [facility]: [phone] [place], [place]: [phone]   [facility]: [phone] [place]: [phone]   [place]: [phone]   [facility]: [phone]   [facility]: [phone]   [facility]: [phone] (daytime) [phone] (after hours, weekends,  holidays)      Step 6: Making the environment safer (plan for lethal means safety):   Plan: Firearms locked and secured at home. Patient works in corrections.     Optional: What is most important to me and worth living for?      [place] Safety Plan. Merlyn Lew and Parker Ornelas. Used with permission of the authors.       Sadaf Harman DO  Psychiatry Resident, PGY-III

## 2024-04-11 NOTE — PATIENT INSTRUCTIONS
Continue Zoloft 100 mg daily  Vyvanse generic formulation does not appear to be available at local pharmacies  Prescription for Adderall XR 20 mg daily sent to [facility] in [place]   Increase Adderall XR to 20 mg daily, taking stimulant holidays as appropriate    Please call the office nursing staff for medication issues including refills, problems obtaining medications, side effects, etc at [phone].   Please return for a follow up appointment as discussed. If you are running late or are unable to attend your appointment, please call our [place] office at [phone].    If you are in psychological crisis including not limited to suicidal/homicidal thoughts or thoughts of self-injury, do not hesitate to call/contact your County Crisis hotline (see below), call 291, call [phone] (mental health crisis), or go to the nearest emergency department.  [facility] Crisis: [phone]  [facility] Crisis: [phone]  [facility] Crisis: [phone]  [facility] Crisis: [phone]  [facility] Crisis: [phone]  [facility] Crisis: [phone]  [facility] Crisis: [phone]  National Suicide Prevention Hotline: 1-396.835.9106

## 2024-05-05 DIAGNOSIS — G43.111 INTRACTABLE MIGRAINE WITH AURA WITH STATUS MIGRAINOSUS: ICD-10-CM

## 2024-05-05 DIAGNOSIS — Z30.9 ENCOUNTER FOR CONTRACEPTIVE MANAGEMENT, UNSPECIFIED TYPE: ICD-10-CM

## 2024-05-06 RX ORDER — DROSPIRENONE AND ETHINYL ESTRADIOL 0.02-3(28)
1 KIT ORAL DAILY
Qty: 84 TABLET | Refills: 1 | Status: SHIPPED | OUTPATIENT
Start: 2024-05-06

## 2024-05-06 RX ORDER — BUTALBITAL, ACETAMINOPHEN, CAFFEINE AND CODEINE PHOSPHATE 50; 325; 40; 30 MG/1; MG/1; MG/1; MG/1
2 CAPSULE ORAL EVERY 6 HOURS PRN
Qty: 15 CAPSULE | Refills: 0 | Status: SHIPPED | OUTPATIENT
Start: 2024-05-06

## 2024-05-23 DIAGNOSIS — F90.0 ADHD (ATTENTION DEFICIT HYPERACTIVITY DISORDER), INATTENTIVE TYPE: ICD-10-CM

## 2024-05-23 RX ORDER — DEXTROAMPHETAMINE SACCHARATE, AMPHETAMINE ASPARTATE MONOHYDRATE, DEXTROAMPHETAMINE SULFATE AND AMPHETAMINE SULFATE 5; 5; 5; 5 MG/1; MG/1; MG/1; MG/1
20 CAPSULE, EXTENDED RELEASE ORAL EVERY MORNING
Qty: 30 CAPSULE | Refills: 0 | Status: SHIPPED | OUTPATIENT
Start: 2024-05-23 | End: 2024-05-23

## 2024-05-23 RX ORDER — DEXTROAMPHETAMINE SACCHARATE, AMPHETAMINE ASPARTATE MONOHYDRATE, DEXTROAMPHETAMINE SULFATE AND AMPHETAMINE SULFATE 5; 5; 5; 5 MG/1; MG/1; MG/1; MG/1
20 CAPSULE, EXTENDED RELEASE ORAL EVERY MORNING
Qty: 30 CAPSULE | Refills: 0 | Status: SHIPPED | OUTPATIENT
Start: 2024-05-23 | End: 2024-05-23 | Stop reason: SDUPTHER

## 2024-05-23 RX ORDER — DEXTROAMPHETAMINE SACCHARATE, AMPHETAMINE ASPARTATE MONOHYDRATE, DEXTROAMPHETAMINE SULFATE AND AMPHETAMINE SULFATE 5; 5; 5; 5 MG/1; MG/1; MG/1; MG/1
20 CAPSULE, EXTENDED RELEASE ORAL EVERY MORNING
Qty: 30 CAPSULE | Refills: 0 | Status: SHIPPED | OUTPATIENT
Start: 2024-05-23 | End: 2024-06-22

## 2024-05-23 NOTE — TELEPHONE ENCOUNTER
Medication Refill Request     Name of Medication adderall xr  Dose/Frequency 20mg take 1 capsule by mouth every morning  Quantity 30  Verified pharmacy   [x]  Verified ordering Provider   [x]  Does patient have enough for the next 3 days? Yes [] No [x]  Does patient have a follow-up appointment scheduled? Yes [x] No []   If so when is appointment: 6/24/2024 9:15am

## 2024-05-23 NOTE — TELEPHONE ENCOUNTER
Refill request received and reviewed. PDMP reviewed, last filled 04/11/24, no discrepancies or concerns noted.  Refill sent to preferred pharmacy.    Thank you,  Sadaf Harman, DO

## 2024-07-04 DIAGNOSIS — G43.111 INTRACTABLE MIGRAINE WITH AURA WITH STATUS MIGRAINOSUS: ICD-10-CM

## 2024-07-05 DIAGNOSIS — F90.0 ADHD (ATTENTION DEFICIT HYPERACTIVITY DISORDER), INATTENTIVE TYPE: ICD-10-CM

## 2024-07-05 DIAGNOSIS — F33.1 MODERATE EPISODE OF RECURRENT MAJOR DEPRESSIVE DISORDER (HCC): ICD-10-CM

## 2024-07-05 RX ORDER — DEXTROAMPHETAMINE SACCHARATE, AMPHETAMINE ASPARTATE MONOHYDRATE, DEXTROAMPHETAMINE SULFATE AND AMPHETAMINE SULFATE 5; 5; 5; 5 MG/1; MG/1; MG/1; MG/1
20 CAPSULE, EXTENDED RELEASE ORAL EVERY MORNING
Qty: 30 CAPSULE | Refills: 0 | Status: SHIPPED | OUTPATIENT
Start: 2024-07-05 | End: 2024-08-04

## 2024-07-05 RX ORDER — SERTRALINE HYDROCHLORIDE 100 MG/1
100 TABLET, FILM COATED ORAL DAILY
Qty: 30 TABLET | Refills: 0 | Status: SHIPPED | OUTPATIENT
Start: 2024-07-05 | End: 2024-08-04

## 2024-07-05 RX ORDER — BUTALBITAL, ACETAMINOPHEN, CAFFEINE AND CODEINE PHOSPHATE 50; 325; 40; 30 MG/1; MG/1; MG/1; MG/1
2 CAPSULE ORAL EVERY 6 HOURS PRN
Qty: 15 CAPSULE | Refills: 0 | Status: SHIPPED | OUTPATIENT
Start: 2024-07-05

## 2024-07-05 NOTE — TELEPHONE ENCOUNTER
Refill requests received and reviewed. Patient requires JORDAN appointment scheduled, and staff made aware and will contact patient.      PDMP reviewed, Adderall XR last filled 05/23/24. No discrepancies or concerns noted. Refills sent to preferred pharmacy.    Thank you,  Sadaf Harman, DO

## 2024-07-05 NOTE — TELEPHONE ENCOUNTER
Medication Refill Request     Name of Medication ADDERALL XR  Dose/Frequency 20 mg/ Take 1 capsule by mouth every morning.,  Quantity 30  Verified pharmacy   [x]  Verified ordering Provider   [x]  Does patient have enough for the next 3 days? Yes [] No [x]  Does patient have a follow-up appointment scheduled? Yes [] No [x]   If so when is appointment: needs appt scheduled    Medication Refill Request     Name of Medication SERTRALINE  Dose/Frequency 100 mg/ Take 1 tablet by mouth daily.  Quantity 90  Verified pharmacy   [x]  Verified ordering Provider   [x]  Does patient have enough for the next 3 days? Yes [] No [x]  Does patient have a follow-up appointment scheduled? Yes [] No [x]   If so when is appointment: needs appt scheduled

## 2024-07-26 DIAGNOSIS — G43.111 INTRACTABLE MIGRAINE WITH AURA WITH STATUS MIGRAINOSUS: ICD-10-CM

## 2024-07-26 RX ORDER — BUTALBITAL, ACETAMINOPHEN, CAFFEINE AND CODEINE PHOSPHATE 50; 325; 40; 30 MG/1; MG/1; MG/1; MG/1
2 CAPSULE ORAL EVERY 6 HOURS PRN
Qty: 15 CAPSULE | Refills: 0 | Status: SHIPPED | OUTPATIENT
Start: 2024-07-26

## 2024-07-31 DIAGNOSIS — F33.1 MODERATE EPISODE OF RECURRENT MAJOR DEPRESSIVE DISORDER (HCC): ICD-10-CM

## 2024-07-31 RX ORDER — SERTRALINE HYDROCHLORIDE 100 MG/1
100 TABLET, FILM COATED ORAL DAILY
Qty: 30 TABLET | Refills: 0 | Status: SHIPPED | OUTPATIENT
Start: 2024-07-31

## 2024-08-06 ENCOUNTER — VBI (OUTPATIENT)
Dept: ADMINISTRATIVE | Facility: OTHER | Age: 34
End: 2024-08-06

## 2024-08-06 DIAGNOSIS — F90.0 ADHD (ATTENTION DEFICIT HYPERACTIVITY DISORDER), INATTENTIVE TYPE: ICD-10-CM

## 2024-08-06 NOTE — TELEPHONE ENCOUNTER
For covering provider's review. Lisa was originally seeing Dr. Harman but has a JORDAN appointment scheduled with you for 8/21.

## 2024-08-06 NOTE — TELEPHONE ENCOUNTER
Reason for call:   [x] Refill   [] Prior Auth  [] Other:     Office:   [x] Specialty/Provider - psych / [facility]    Medication: generic Adderall XR    Dose/Frequency: 20mg qam    Quantity: 30    Pharmacy: [facility]/pharmacy #[id number] - [place], [place] - [address]     Does the patient have enough for 3 days?   [] Yes   [x] No - Send as HP to POD

## 2024-08-06 NOTE — TELEPHONE ENCOUNTER
08/06/24 10:02 AM     Chart reviewed for Pap Smear (HPV) aka Cervical Cancer Screening ; nothing is submitted to the patient's insurance at this time.     JO ANN PENN MA   [facility]

## 2024-08-07 RX ORDER — DEXTROAMPHETAMINE SACCHARATE, AMPHETAMINE ASPARTATE MONOHYDRATE, DEXTROAMPHETAMINE SULFATE AND AMPHETAMINE SULFATE 5; 5; 5; 5 MG/1; MG/1; MG/1; MG/1
20 CAPSULE, EXTENDED RELEASE ORAL EVERY MORNING
Qty: 30 CAPSULE | Refills: 0 | Status: SHIPPED | OUTPATIENT
Start: 2024-08-07 | End: 2024-09-06

## 2024-08-08 NOTE — TELEPHONE ENCOUNTER
Called Lisa and advised she call neighboring pharmacies to see if any have in stock. She will call back when she finds out. Will forward to provider to keep in the loop.

## 2024-08-08 NOTE — TELEPHONE ENCOUNTER
Lisa Zamora and/or patient requested a call back to discuss medication, Adderall XR, is on back order at [facility] and would like to know her options for getting prescription. If it be to go with a generic medication or find another pharmacy that has it in stock?    They can be reached at P# [phone].       Thank you.

## 2024-08-12 DIAGNOSIS — Z30.9 ENCOUNTER FOR CONTRACEPTIVE MANAGEMENT, UNSPECIFIED TYPE: ICD-10-CM

## 2024-08-12 RX ORDER — DROSPIRENONE AND ETHINYL ESTRADIOL 0.02-3(28)
1 KIT ORAL DAILY
Qty: 84 TABLET | Refills: 1 | Status: SHIPPED | OUTPATIENT
Start: 2024-08-12

## 2024-08-14 DIAGNOSIS — F33.1 MODERATE EPISODE OF RECURRENT MAJOR DEPRESSIVE DISORDER (HCC): ICD-10-CM

## 2024-08-14 RX ORDER — SERTRALINE HYDROCHLORIDE 100 MG/1
100 TABLET, FILM COATED ORAL DAILY
Qty: 90 TABLET | Refills: 1 | Status: SHIPPED | OUTPATIENT
Start: 2024-08-14

## 2024-08-14 NOTE — TELEPHONE ENCOUNTER
Pharmacy called and they are unsure of how many tablets pt has left. Sending HP just incase she is running out.    Reason for call:   [x] Refill   [] Prior Auth  [] Other:     Office:   [] PCP/Provider -   [x] Specialty/Provider - [facility] / Sadaf aHrman,      Medication:   sertraline (ZOLOFT) 100 mg tablet - TAKE 1 TABLET BY MOUTH EVERY DAY     Pharmacy:   [facility] - [place], [place] - [address] [place] [address]     Does the patient have enough for 3 days?   [] Yes   [x] No - Send as HP to POD

## 2024-08-20 DIAGNOSIS — G43.111 INTRACTABLE MIGRAINE WITH AURA WITH STATUS MIGRAINOSUS: ICD-10-CM

## 2024-08-21 RX ORDER — BUTALBITAL, ACETAMINOPHEN, CAFFEINE AND CODEINE PHOSPHATE 50; 325; 40; 30 MG/1; MG/1; MG/1; MG/1
2 CAPSULE ORAL EVERY 6 HOURS PRN
Qty: 15 CAPSULE | Refills: 0 | Status: SHIPPED | OUTPATIENT
Start: 2024-08-21

## 2024-08-27 ENCOUNTER — TELEPHONE (OUTPATIENT)
Dept: OTHER | Facility: OTHER | Age: 34
End: 2024-08-27

## 2024-08-28 DIAGNOSIS — G44.229 CHRONIC TENSION-TYPE HEADACHE, NOT INTRACTABLE: ICD-10-CM

## 2024-08-28 NOTE — TELEPHONE ENCOUNTER
Patient is calling regarding cancelling an appointment.    Date/Time: 08/28/2024 9:15am    Patient was rescheduled: YES [] NO [x]    Patient requesting call back to reschedule: YES [x] NO []

## 2024-08-29 ENCOUNTER — OFFICE VISIT (OUTPATIENT)
Dept: FAMILY MEDICINE CLINIC | Facility: CLINIC | Age: 34
End: 2024-08-29
Payer: COMMERCIAL

## 2024-08-29 ENCOUNTER — HOSPITAL ENCOUNTER (EMERGENCY)
Facility: HOSPITAL | Age: 34
Discharge: HOME | End: 2024-08-29
Attending: EMERGENCY MEDICINE
Payer: COMMERCIAL

## 2024-08-29 VITALS
TEMPERATURE: 98.1 F | HEART RATE: 73 BPM | RESPIRATION RATE: 16 BRPM | SYSTOLIC BLOOD PRESSURE: 114 MMHG | DIASTOLIC BLOOD PRESSURE: 71 MMHG | OXYGEN SATURATION: 100 %

## 2024-08-29 VITALS
HEIGHT: 61 IN | SYSTOLIC BLOOD PRESSURE: 116 MMHG | TEMPERATURE: 97.9 F | RESPIRATION RATE: 16 BRPM | WEIGHT: 148 LBS | BODY MASS INDEX: 27.94 KG/M2 | DIASTOLIC BLOOD PRESSURE: 82 MMHG | OXYGEN SATURATION: 98 % | HEART RATE: 85 BPM

## 2024-08-29 DIAGNOSIS — R51.9 HEADACHE: Primary | ICD-10-CM

## 2024-08-29 DIAGNOSIS — G44.229 CHRONIC TENSION-TYPE HEADACHE, NOT INTRACTABLE: ICD-10-CM

## 2024-08-29 DIAGNOSIS — G43.111 INTRACTABLE MIGRAINE WITH AURA WITH STATUS MIGRAINOSUS: Primary | ICD-10-CM

## 2024-08-29 LAB
EXT PREGNANCY TEST URINE: NEGATIVE
EXT. CONTROL: NORMAL

## 2024-08-29 PROCEDURE — 99215 OFFICE O/P EST HI 40 MIN: CPT | Performed by: FAMILY MEDICINE

## 2024-08-29 PROCEDURE — 81025 URINE PREGNANCY TEST: CPT | Performed by: EMERGENCY MEDICINE

## 2024-08-29 PROCEDURE — 87636 SARSCOV2 & INF A&B AMP PRB: CPT | Performed by: EMERGENCY MEDICINE

## 2024-08-29 PROCEDURE — 99284 EMERGENCY DEPT VISIT MOD MDM: CPT | Performed by: EMERGENCY MEDICINE

## 2024-08-29 PROCEDURE — 96365 THER/PROPH/DIAG IV INF INIT: CPT

## 2024-08-29 PROCEDURE — 99283 EMERGENCY DEPT VISIT LOW MDM: CPT

## 2024-08-29 PROCEDURE — 96375 TX/PRO/DX INJ NEW DRUG ADDON: CPT

## 2024-08-29 RX ORDER — METOCLOPRAMIDE HYDROCHLORIDE 5 MG/ML
10 INJECTION INTRAMUSCULAR; INTRAVENOUS ONCE
Status: COMPLETED | OUTPATIENT
Start: 2024-08-29 | End: 2024-08-29

## 2024-08-29 RX ORDER — DIPHENHYDRAMINE HYDROCHLORIDE 50 MG/ML
25 INJECTION INTRAMUSCULAR; INTRAVENOUS ONCE
Status: COMPLETED | OUTPATIENT
Start: 2024-08-29 | End: 2024-08-29

## 2024-08-29 RX ORDER — KETOROLAC TROMETHAMINE 30 MG/ML
30 INJECTION, SOLUTION INTRAMUSCULAR; INTRAVENOUS ONCE
Status: COMPLETED | OUTPATIENT
Start: 2024-08-29 | End: 2024-08-29

## 2024-08-29 RX ORDER — RIMEGEPANT SULFATE 75 MG/75MG
TABLET, ORALLY DISINTEGRATING ORAL
Qty: 10 TABLET | Refills: 0 | OUTPATIENT
Start: 2024-08-29

## 2024-08-29 RX ORDER — MAGNESIUM SULFATE HEPTAHYDRATE 40 MG/ML
2 INJECTION, SOLUTION INTRAVENOUS ONCE
Status: COMPLETED | OUTPATIENT
Start: 2024-08-29 | End: 2024-08-29

## 2024-08-29 RX ADMIN — MAGNESIUM SULFATE HEPTAHYDRATE 2 G: 40 INJECTION, SOLUTION INTRAVENOUS at 12:24

## 2024-08-29 RX ADMIN — KETOROLAC TROMETHAMINE 30 MG: 30 INJECTION, SOLUTION INTRAMUSCULAR; INTRAVENOUS at 12:18

## 2024-08-29 RX ADMIN — SODIUM CHLORIDE 1000 ML: 0.9 INJECTION, SOLUTION INTRAVENOUS at 12:17

## 2024-08-29 RX ADMIN — DIPHENHYDRAMINE HYDROCHLORIDE 25 MG: 50 INJECTION, SOLUTION INTRAMUSCULAR; INTRAVENOUS at 12:18

## 2024-08-29 RX ADMIN — METOCLOPRAMIDE 10 MG: 5 INJECTION, SOLUTION INTRAMUSCULAR; INTRAVENOUS at 12:25

## 2024-08-29 NOTE — PROGRESS NOTES
Ambulatory Visit  Name: Lisa Zamora      : 1990      MRN: [id number]  Encounter Provider: Tyler Vieira MD  Encounter Date: 2024   Encounter department: [facility]    Assessment & Plan   1. Intractable migraine with aura with status migrainosus  -     Transfer to other facility  -     Ambulatory Referral to Neurology; Future  ER for migraine cocktail.  It has been 3 days.  I have low suspicion for subarachnoid hemorrhage believe this is intractable migraine.  ER for further evaluation with migraine cocktail.  Will place referral to neurology for follow-up for chronic migraines   No response to Medications          History of Present Illness     Patient presents with:  Establish Care    Migraine headache for the past 3 days.  Takes Nurtec and Fioricet.  No relief with those medications.  Felt dizzy yesterday.  Felt she was seeing double.  Worst migraine she's ever had.  No resposnse to medications.   ER for Migraine cocktail.  On Monday she felt it coming on and on Tuesday morning she woke up suddenly at 8 AM with a severe headache patient states feels like she got hit by a [place] truck.  Sensitive with bright lights.  Blowing her nose makes it worse.  Reports dizziness and double vision.  Today she is only taken ibuprofen.  She took Nurtec last night.  Has not been able to take Fioricet due to nausea and vomiting.  Lights and office are bothering patient at this time.    Migraine  This is a new problem. The current episode started in the past 7 days. The problem occurs constantly. The problem is unchanged. Pain location: back of head. The pain radiates to the right neck and left neck. The pain quality is not similar to prior headaches. The pain is at a severity of 8/10. The pain is moderate. Associated symptoms include blurred vision, dizziness, eye pain, nausea, neck pain, photophobia, tinnitus and vomiting. The symptoms are aggravated by bright light, coughing and  sneezing. Past treatments include NSAIDs, oral narcotics, darkened room and cold packs. The treatment provided no relief. Her past medical history is significant for migraine headaches.     Review of Systems   HENT:  Positive for tinnitus.    Eyes:  Positive for blurred vision, photophobia and pain.   Gastrointestinal:  Positive for nausea and vomiting.   Musculoskeletal:  Positive for neck pain.   Neurological:  Positive for dizziness.     Past Medical History:   Diagnosis Date    Anemia     Anxiety     Depression 2014    Headache(784.0)      Past Surgical History:   Procedure Laterality Date    WISDOM TOOTH EXTRACTION       Family History   Problem Relation Age of Onset    Depression Mother     Anxiety disorder Mother     Cancer Father     Depression Sister     Anxiety disorder Sister     OCD Sister     Melanoma Other         Unspecifed Grandmother    Liver cancer Maternal Grandmother     No Known Problems Paternal Grandmother     No Known Problems Paternal Grandfather     No Known Problems Maternal Aunt     Substance Abuse Neg Hx         Mother/Father    Mental illness Neg Hx         Mother/Father     Social History     Tobacco Use    Smoking status: Former     Current packs/day: 0.00     Types: Cigarettes     Start date: 8/1/2010     Quit date: 4/1/2021     Years since quitting: 3.4    Smokeless tobacco: Never    Tobacco comments:     2 cigarettes daily   Vaping Use    Vaping status: Every Day    Substances: Nicotine   Substance and Sexual Activity    Alcohol use: Yes     Alcohol/week: 3.0 standard drinks of alcohol     Types: 3 Cans of beer per week     Comment: Social    Drug use: Never    Sexual activity: Yes     Partners: Female     Birth control/protection: None     Current Outpatient Medications on File Prior to Visit   Medication Sig    butalbital-acetaminophen-caffeine-codeine (FIORICET WITH CODEINE) -06-30 MG per capsule TAKE 2 CAPSULES BY MOUTH EVERY 6 (SIX) HOURS AS NEEDED FOR HEADACHES     "drospirenone-ethinyl estradiol (Vestura) 3-0.02 MG per tablet Take 1 tablet by mouth daily    methocarbamol (ROBAXIN) 750 mg tablet if needed    ondansetron (ZOFRAN) 4 mg tablet TAKE 1 TABLET BY MOUTH EVERY 8 HOURS AS NEEDED FOR NAUSEA AND VOMITING    rimegepant sulfate (NURTEC) 75 mg TBDP Take 1 tabet PO at onset of headache.  Max of 1 dose per day.    sertraline (ZOLOFT) 100 mg tablet Take 1 tablet (100 mg total) by mouth daily    amphetamine-dextroamphetamine (ADDERALL XR, 20MG,) 20 MG 24 hr capsule Take 1 capsule (20 mg total) by mouth every morning Max Daily Amount: 20 mg (Patient not taking: Reported on 8/29/2024)    [DISCONTINUED] cholecalciferol (VITAMIN D3) 1,000 units tablet TAKE 1 TABLET BY MOUTH EVERY DAY    [DISCONTINUED] cyclobenzaprine (FLEXERIL) 10 mg tablet Take 1 tablet (10 mg total) by mouth 3 (three) times a day as needed for muscle spasms    [DISCONTINUED] naproxen (NAPROSYN) 500 mg tablet Take 500 mg by mouth every 12 (twelve) hours as needed     No Known Allergies  Immunization History   Administered Date(s) Administered    COVID-19 MODERNA VACC 0.5 ML IM 02/02/2022, 03/02/2022    Tdap 03/12/2024     Objective     /82 (BP Location: Left arm, Patient Position: Sitting, Cuff Size: Large)   Pulse 85   Temp 97.9 °F (36.6 °C) (Temporal)   Resp 16   Ht 5' 1.4\" (1.56 m)   Wt 67.1 kg (148 lb)   SpO2 98%   BMI 27.60 kg/m²     Physical Exam  Vitals and nursing note reviewed.   Constitutional:       Appearance: She is well-developed. She is ill-appearing.   HENT:      Head: Normocephalic and atraumatic.   Eyes:      Extraocular Movements: Extraocular movements intact.      Pupils: Pupils are equal, round, and reactive to light.   Cardiovascular:      Rate and Rhythm: Normal rate and regular rhythm.   Pulmonary:      Effort: Pulmonary effort is normal.      Breath sounds: Normal breath sounds.   Abdominal:      General: Bowel sounds are normal.      Palpations: Abdomen is soft. "   Musculoskeletal:      Cervical back: Normal range of motion. No rigidity.   Skin:     General: Skin is warm.   Neurological:      General: No focal deficit present.      Mental Status: She is alert.   Psychiatric:         Mood and Affect: Mood normal.         Speech: Speech normal.

## 2024-08-29 NOTE — Clinical Note
Lisa Zamora was seen and treated in our emergency department on 8/29/2024.    No restrictions            Diagnosis: Headache    Lisa  may return to work on return date.    She may return on this date: 08/30/2024         If you have any questions or concerns, please don't hesitate to call.      Benito Mireles MD    ______________________________           _______________          _______________  Hospital Representative                              Date                                Time

## 2024-08-29 NOTE — ED PROVIDER NOTES
History  Chief Complaint   Patient presents with    Migraine     Pt c/o a migraine x4 days, photosensitivity, (+)N/V. Reports has zofran at home that helped with the nausea. Rates it an 8/10 pain. Does have history of migraines, does not feel like a typical migraine. Pt reports past migraines have been in the temporal region, but this migraine has been in the occipital region     This is a 34-year-old female with a relevant past medical history of chronic migraines, presenting to the ED today for a headache.  Patient states that her headache is occipital, and radiates around her head.  She states that her pain has been present for the past 4 days, intractable.  It is a 7 out of 10 in intensity, not improved despite Zofran, Tylenol, Nurtec that she has taken at home.  Patient states that she used to have nausea up until yesterday when she started taking her Zofran and that has improved.  She denies any visual changes, hearing changes, focal weakness, numbness or tingling, slurred speech, facial droop or other symptoms aside from some rhinorrhea, as well as some nasal congestion.  She also has some fatigue.  She had a fever yesterday up to 101 °F.  She took some Tylenol which improved her symptoms.        Prior to Admission Medications   Prescriptions Last Dose Informant Patient Reported? Taking?   amphetamine-dextroamphetamine (ADDERALL XR, 20MG,) 20 MG 24 hr capsule  Self No No   Sig: Take 1 capsule (20 mg total) by mouth every morning Max Daily Amount: 20 mg   Patient not taking: Reported on 8/29/2024   butalbital-acetaminophen-caffeine-codeine (FIORICET WITH CODEINE) -69-30 MG per capsule  Self No No   Sig: TAKE 2 CAPSULES BY MOUTH EVERY 6 (SIX) HOURS AS NEEDED FOR HEADACHES   drospirenone-ethinyl estradiol (Vestura) 3-0.02 MG per tablet  Self No No   Sig: Take 1 tablet by mouth daily   methocarbamol (ROBAXIN) 750 mg tablet  Self Yes No   Sig: if needed   ondansetron (ZOFRAN) 4 mg tablet  Self No No   Sig:  TAKE 1 TABLET BY MOUTH EVERY 8 HOURS AS NEEDED FOR NAUSEA AND VOMITING   rimegepant sulfate (NURTEC) 75 mg TBDP   No No   Sig: Take 1 tabet PO at onset of headache.  Max of 1 dose per day.   sertraline (ZOLOFT) 100 mg tablet  Self No No   Sig: Take 1 tablet (100 mg total) by mouth daily      Facility-Administered Medications: None       Past Medical History:   Diagnosis Date    Anemia     Anxiety     Depression 2014    Headache(784.0)        Past Surgical History:   Procedure Laterality Date    WISDOM TOOTH EXTRACTION         Family History   Problem Relation Age of Onset    Depression Mother     Anxiety disorder Mother     Cancer Father     Depression Sister     Anxiety disorder Sister     OCD Sister     Melanoma Other         Unspecifed Grandmother    Liver cancer Maternal Grandmother     No Known Problems Paternal Grandmother     No Known Problems Paternal Grandfather     No Known Problems Maternal Aunt     Substance Abuse Neg Hx         Mother/Father    Mental illness Neg Hx         Mother/Father     I have reviewed and agree with the history as documented.    E-Cigarette/Vaping    E-Cigarette Use Current Every Day User      E-Cigarette/Vaping Substances    Nicotine Yes     THC No     CBD No     Flavoring No     Other No     Unknown No      Social History     Tobacco Use    Smoking status: Former     Current packs/day: 0.00     Types: Cigarettes     Start date: 8/1/2010     Quit date: 4/1/2021     Years since quitting: 3.4    Smokeless tobacco: Never    Tobacco comments:     2 cigarettes daily   Vaping Use    Vaping status: Every Day    Substances: Nicotine   Substance Use Topics    Alcohol use: Yes     Alcohol/week: 3.0 standard drinks of alcohol     Types: 3 Cans of beer per week     Comment: Social    Drug use: Never       Review of Systems   Constitutional:  Positive for fatigue and fever (Resolved). Negative for activity change and chills.   HENT:  Positive for congestion and rhinorrhea.    Eyes:  Negative  for photophobia and visual disturbance.   Respiratory:  Negative for cough, chest tightness and shortness of breath.    Cardiovascular:  Negative for chest pain and leg swelling.   Gastrointestinal:  Positive for nausea (Resolved) and vomiting (Resolved). Negative for abdominal distention.   Genitourinary:  Negative for dysuria and frequency.   Musculoskeletal:  Negative for back pain and neck stiffness.   Skin:  Negative for rash and wound.   Neurological:  Positive for headaches. Negative for dizziness and weakness.   Psychiatric/Behavioral:  Negative for agitation and suicidal ideas.        Physical Exam  Physical Exam  Vitals and nursing note reviewed.   Constitutional:       General: She is not in acute distress.     Appearance: Normal appearance. She is normal weight. She is not ill-appearing or toxic-appearing.   HENT:      Head: Normocephalic and atraumatic.      Right Ear: External ear normal.      Left Ear: External ear normal.      Nose: Congestion and rhinorrhea present.      Comments: Maxillary sinus tenderness to palpation     Mouth/Throat:      Mouth: Mucous membranes are moist.      Pharynx: Oropharynx is clear. No oropharyngeal exudate.   Eyes:      General: No scleral icterus.     Extraocular Movements: Extraocular movements intact.      Conjunctiva/sclera: Conjunctivae normal.      Pupils: Pupils are equal, round, and reactive to light.      Comments: Patient wearing glasses   Cardiovascular:      Rate and Rhythm: Normal rate and regular rhythm.      Pulses: Normal pulses.      Heart sounds: Normal heart sounds. No murmur heard.     No gallop.   Pulmonary:      Effort: Pulmonary effort is normal. No respiratory distress.      Breath sounds: Normal breath sounds. No stridor. No wheezing or rhonchi.   Abdominal:      General: Abdomen is flat. Bowel sounds are normal. There is no distension.      Palpations: Abdomen is soft. There is no mass.      Tenderness: There is no abdominal tenderness.    Musculoskeletal:         General: No swelling or tenderness. Normal range of motion.      Cervical back: Normal range of motion and neck supple. No tenderness.      Right lower leg: No edema.      Left lower leg: No edema.   Skin:     General: Skin is warm and dry.      Capillary Refill: Capillary refill takes less than 2 seconds.      Coloration: Skin is not jaundiced.      Findings: No bruising.   Neurological:      General: No focal deficit present.      Mental Status: She is alert and oriented to person, place, and time. Mental status is at baseline.      Sensory: No sensory deficit.      Motor: No weakness.   Psychiatric:         Mood and Affect: Mood normal.         Behavior: Behavior normal.         Thought Content: Thought content normal.         Judgment: Judgment normal.         Vital Signs  ED Triage Vitals   Temperature Pulse Respirations Blood Pressure SpO2   08/29/24 1136 08/29/24 1136 08/29/24 1136 08/29/24 1136 08/29/24 1136   98.1 °F (36.7 °C) 73 16 114/71 100 %      Temp Source Heart Rate Source Patient Position - Orthostatic VS BP Location FiO2 (%)   08/29/24 1136 08/29/24 1136 08/29/24 1136 08/29/24 1136 --   Oral Monitor Sitting Right arm       Pain Score       08/29/24 1218       8           Vitals:    08/29/24 1136   BP: 114/71   Pulse: 73   Patient Position - Orthostatic VS: Sitting         Visual Acuity  Visual Acuity      Flowsheet Row Most Recent Value   L Pupil Size (mm) 3   R Pupil Size (mm) 3            ED Medications  Medications   ketorolac (TORADOL) injection 30 mg (30 mg Intravenous Given 8/29/24 1218)   metoclopramide (REGLAN) injection 10 mg (10 mg Intravenous Given 8/29/24 1225)   diphenhydrAMINE (BENADRYL) injection 25 mg (25 mg Intravenous Given 8/29/24 1218)   magnesium sulfate 2 g/50 mL IVPB (premix) 2 g (0 g Intravenous Stopped 8/29/24 1321)   sodium chloride 0.9 % bolus 1,000 mL (0 mL Intravenous Stopped 8/29/24 1321)       Diagnostic Studies  Results Reviewed        Procedure Component Value Units Date/Time    FLU/COVID - if FLU clinically relevant [[id number]] Collected: 08/29/24 1228    Lab Status: In process Specimen: Nares from Nose Updated: 08/29/24 1231    POCT pregnancy, urine [[id number]]  (Normal) Resulted: 08/29/24 1217    Lab Status: Final result Updated: 08/29/24 1218     EXT Preg Test, Ur Negative     Control Valid                   No orders to display              Procedures  Procedures         ED Course                                 SBIRT 20yo+      Flowsheet Row Most Recent Value   Initial Alcohol Screen: US AUDIT-C     1. How often do you have a drink containing alcohol? 3 Filed at: 08/29/2024 1137   2. How many drinks containing alcohol do you have on a typical day you are drinking?  1 Filed at: 08/29/2024 1131   3b. FEMALE Any Age, or MALE 65+: How often do you have 4 or more drinks on one occassion? 0 Filed at: 08/29/2024 1137   Audit-C Score 4 Filed at: 08/29/2024 1137   RENATE: How many times in the past year have you...    Used an illegal drug or used a prescription medication for non-medical reasons? Never Filed at: 08/29/2024 1134                      Medical Decision Making  This is a 34-year-old female presenting to the ED today for complaint of a headache.  Patient states that her headache has been present for the past 4 days, intractable.  She has had fever up to 101 °F at home, resolved with Tylenol.  She states that she has not had any other significantly related symptoms aside from some rhinorrhea, as well as some generalized fatigue.  She has not any neurologic symptoms.  Her exam is completely unremarkable aside from tenderness to palpation of her maxillary sinuses.  Her differential diagnosis includes: Migraine versus COVID versus other viral URI versus other.  Patient received a migraine cocktail with complete resolution of her symptoms.  Patient then requested to go home.  I did discuss with her that her flu COVID swab was still pending.   "Patient will check MyChart for her results.  Patient will follow-up with her neurologist as an outpatient.  The management plan was discussed in detail with the patient at bedside and all questions were answered. Strict ED return instructions were discussed at bedside. Prior to discharge, both verbal and written instructions were provided. We discussed the signs and symptoms that should prompt the patient to return to the ED. All questions were answered and the patient was comfortable with the plan of care and discharged home. The patient agrees to return to the Emergency Department for concerns and/or progression of illness.    Portions of the above record have been created with voice recognition software.  Occasional wrong word or \"sound alike\" substitutions may have occurred due to the inherent limitations of voice recognition software.  Read the chart carefully and recognize, using context, where substitutions may have occurred.    Amount and/or Complexity of Data Reviewed  Labs: ordered.    Risk  Prescription drug management.                 Disposition  Final diagnoses:   Headache     Time reflects when diagnosis was documented in both MDM as applicable and the Disposition within this note       Time User Action Codes Description Comment    8/29/2024  1:08 PM Benito Mireles Add [R51.9] Headache           ED Disposition       ED Disposition   Discharge    Condition   Stable    Date/Time   Thu Aug 29, 2024 1308    Comment   Lisa Zamora discharge to home/self care.                   Follow-up Information       Follow up With Specialties Details Why Contact Info    Tyler Vieira MD Family Medicine   [address] [place] [id number] [phone]              Discharge Medication List as of 8/29/2024  1:10 PM        START taking these medications    Details   rimegepant sulfate (NURTEC) 75 mg TBDP Take 1 tabet PO at onset of headache.  Max of 1 dose per day., Normal           CONTINUE these " medications which have NOT CHANGED    Details   amphetamine-dextroamphetamine (ADDERALL XR, 20MG,) 20 MG 24 hr capsule Take 1 capsule (20 mg total) by mouth every morning Max Daily Amount: 20 mg, Starting Wed 8/7/2024, Until Fri 9/6/2024, Normal      butalbital-acetaminophen-caffeine-codeine (FIORICET WITH CODEINE) -94-30 MG per capsule TAKE 2 CAPSULES BY MOUTH EVERY 6 (SIX) HOURS AS NEEDED FOR HEADACHES, Starting Wed 8/21/2024, Normal      drospirenone-ethinyl estradiol (Vestura) 3-0.02 MG per tablet Take 1 tablet by mouth daily, Starting Mon 8/12/2024, Normal      methocarbamol (ROBAXIN) 750 mg tablet if needed, Starting Thu 2/22/2024, Historical Med      ondansetron (ZOFRAN) 4 mg tablet TAKE 1 TABLET BY MOUTH EVERY 8 HOURS AS NEEDED FOR NAUSEA AND VOMITING, Normal      sertraline (ZOLOFT) 100 mg tablet Take 1 tablet (100 mg total) by mouth daily, Starting Wed 8/14/2024, Normal             No discharge procedures on file.    PDMP Review         Value Time User    PDMP Reviewed  Yes 8/21/2024  5:53 PM Amadeo Sadler DO            ED Provider  Electronically Signed by             Benito Mireles MD  08/29/24 1956

## 2024-08-29 NOTE — DISCHARGE INSTRUCTIONS
You were seen in the ED for a headache.  You had an examination showing no significant abnormalities. Please follow up with your primary care physician and/or neurologist within the next 1-2 weeks for continued management of your condition.  Please come back to the ED if your symptoms return or worsen or if you develop uncontrollable pain, shortness of breath, loss of consciousness, focal weakness, numbness or tingling, or uncontrollable seizures.  Thank you very much for utilizing the ED this afternoon.

## 2024-08-30 LAB
FLUAV RNA RESP QL NAA+PROBE: NEGATIVE
FLUBV RNA RESP QL NAA+PROBE: NEGATIVE
SARS-COV-2 RNA RESP QL NAA+PROBE: NEGATIVE

## 2024-09-03 NOTE — PSYCH
Psychiatric Evaluation - Behavioral Health   MRN: [id number]  Visit Time    Visit Start Time: 8:37 am  Visit Stop Time: 9:23 am  Total Visit Duration:  46 minutes    This note was shared with patient.    Assessment/Plan:   Lisa Zamora is a 34 y.o. 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 Cortes, Ph.D., with suicide risk factors including access to lethal means (guns), depression, and anxiety, with no prior suicide attempts or gestures, no prior inpatient psychiatric hospitalizations,  presenting to the [facility] outpatient clinic for a full psychiatric intake assessment including evaluation for medication and psychotherapy as part of a transfer of care isit from Dr. Harman.  Patient reports that she has been doing well overall in regards to her anxiety and depressive symptoms, rating them as a 3/10 and 1/10 in severity, respectively.  Patient does report ongoing issues with ADHD since being without her stimulant medication for the past month due to pharmacy shortages.  Patient also reports ongoing issue with insomnia and sleep disturbance which she states has been occurring for the past few years and may be related to worrying/racing thoughts that occur at night.  We discussed continuing patient's Zoloft at current dosage to continue to manage her anxiety and depressive symptoms and restarting patient's Vyvanse to help with her ADHD.  We also discussed adding as needed Atarax to help with patient's nighttime insomnia and anxiety symptoms.  Patient was amenable to plan.     Diagnoses and all orders for this  visit:    ADHD (attention deficit hyperactivity disorder), inattentive type  -     lisdexamfetamine (Vyvanse) 30 MG capsule; Take 1 capsule (30 mg total) by mouth every morning Max Daily Amount: 30 mg    Generalized anxiety disorder  -     hydrOXYzine HCL (ATARAX) 25 mg tablet; Take 1 tablet (25 mg total) by mouth daily at bedtime as needed for itching    Insomnia, unspecified type  -     hydrOXYzine HCL (ATARAX) 25 mg tablet; Take 1 tablet (25 mg total) by mouth daily at bedtime as needed for itching          Treatment Recommendations/Precautions:  Restart Vyvanse 30 mg every morning, for ADHD symptoms  If unavailable at the pharmacies, may consider switching back to Adderall 30 mg XR  Add Atarax 25 mg as needed at bedtime, for nighttime insomnia and anxiety symptoms  Continue Zoloft 100 mg daily at bedtime for mood and anxiety    Medication management every 8-12 weeks  Referral for psychotherapy previously completed, patient currently on a wait list  Recommend continued Vitamin D3 supplementation and monitoring levels  Monitor BP, HR, weight, CBC w/diff, and CMP in setting of stimulant and SSRI prescriptions  Aware of need to follow up with family physician for medical issues  Aware of 24 hour and weekend coverage for urgent situations accessed by calling [facility] main practice number    Although patient's acute lethality risk is low, long-term/chronic lethality risk is mildly elevated in the presence of MDD-recurrent, anxiety, ADHD-inattentive type and insomnia. At the current moment, Lisa is future-oriented, forward-thinking, and demonstrates ability to act in a self-preserving manner as evidenced by volitionally presenting to the clinic today, seeking treatment. At this juncture, inpatient hospitalization is not currently warranted. To mitigate future risk, patient should adhere to the recommendations of this writer, avoid alcohol/illicit substance use, utilize community-based  resources and familiar support and prioritize mental health treatment.     Based on today's assessment and clinical criteria, Lisa Zamora contracts for safety and is not an imminent risk of harm to self or others. Outpatient level of care is deemed appropriate at this present time. Lisa understands that if they are no longer able to contract for safety, they need to call/contact the outpatient office including this writer, call/contact crisis and/orattend to the nearest Emergency Department for immediate evaluation.    Subjective:    Chief Complaint: JORDAN & follow-up for medication management    History of Present Illness     Lisa Zamora is a 34 y.o. 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 Cortes, Ph.D., with suicide risk factors including access to lethal means (guns), depression, and anxiety, with no prior suicide attempts or gestures, no prior inpatient psychiatric hospitalizations,  presenting to the [facility] outpatient clinic for a full psychiatric intake assessment including evaluation for medication and psychotherapy as part of a transfer of care isit from Dr. Harman.     Lisa reports that she has been doing well overall since last visit with Dr. Harman.  Patient states that she is not experienced a major depressive episode in 4 years, when she was last off her Zoloft.  Patient rates her current level of depression as a 1/10 in severity and denies any current depressive symptoms including appetite changes, feelings of guilt, hopelessness or worthlessness, decreased energy, anhedonia,  "decreased concentration or sleep changes related to depression.    Patient also states that she feels her anxiety has been well-controlled on current dosage of Zoloft.  Patient rates her current level of anxiety as a 3/10 in severity.  Patient states that when she experiences significant anxiety she may experience some irritability, fatigue, decreased concentration, decreased sleep accompanied by excessive worrying about various things.  Patient does report some ongoing issues with sleep which she states has been going on for many years now.  Patient states that on average she only achieves 5 hours of sleep a night, awakening about 4 times a night on average.      Patient reports difficulty falling asleep due to stress and worrying/racing thoughts.  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  Patient states that she typically goes to bed around 11 PM but is unable to fall asleep until around 2-3 AM, with multiple awakenings before finally waking up at 9 AM.  Patient states that when she wakes up in middle the night sometimes she is able to fall back asleep easily and other times she is up for about half an hour, due to worrying thoughts.    Regarding ADHD symptoms, patient states that when she is on stimulant medication her symptoms are fairly well-controlled.  Patient was previously on Vyvanse 30 mg daily but was unable to get this prescription filled due to shortage is at various pharmacies.  Patient was subsequently switched to Adderall XR by previous provider.  However, patient states that she has been without this medication for approximately 1 month due to similar pharmacy shortages.      Patient states that over the past month since being off the stimulant medication she is experienced increased difficulty with completing tasks, decreased ability to focus, decreased concentration, increased distractibility, increased fidgeting and increased \"zoning out\" during interactions both at home and at " "work.  Patient states that when she is on a stimulant previously she felt more \"stable,\" had an easier time completing tasks and functioning at work and at home.    We discussed medication recommendations, including continuing patient's Zoloft at the current dosage for her anxiety and depressive symptoms, as they have been relatively stable for some time at this dosage.  We also discussed restarting Vyvanse 30 mg daily to help with patient's ADHD symptoms, as patient states she felt that the Vyvanse bib parents worked a lot better than the Adderall XR for her symptoms.  We also discussed adding as needed Atarax at bedtime for patient's nighttime insomnia and anxiety symptoms.  Patient was amenable to plan.    Psychiatric Review Of Systems:    Appetite: no  Adverse eating: no  Weight changes: no  Insomnia/sleeplessness: yes, decreased  Fatigue/anergy: yes  Anhedonia/lack of interest: no  Attention/concentration: yes, decreased  Psychomotor agitation/retardation: no  Somatic symptoms: no  Anxiety/panic attack: yes, worrying  Erlinda/hypomania: no  Hopelessness/helplessness/worthlessness: no  Self-injurious behavior/high-risk behavior: no  Suicidal ideation: no  Homicidal ideation: no  Auditory hallucinations: no  Visual hallucinations: no  Other perceptual disturbances: no  Delusional thinking: no  Obsessive/compulsive symptoms: no    Rating Scales  PHQ-2/9 Depression Screening    Little interest or pleasure in doing things: 0 - not at all  Feeling down, depressed, or hopeless: 0 - not at all  Trouble falling or staying asleep, or sleeping too much: 2 - more than half the days  Feeling tired or having little energy: 2 - more than half the days  Poor appetite or overeatin - not at all  Feeling bad about yourself - or that you are a failure or have let yourself or your family down: 0 - not at all  Trouble concentrating on things, such as reading the newspaper or watching television: 2 - more than half the days  Moving " or speaking so slowly that other people could have noticed. Or the opposite - being so fidgety or restless that you have been moving around a lot more than usual: 2 - more than half the days         IGNACIO-7 Flowsheet Screening      Flowsheet Row Most Recent Value   Over the last two weeks, how often have you been bothered by the following problems?     Feeling nervous, anxious, or on edge 0    Not being able to stop or control worrying 0    Worrying too much about different things 0    Trouble relaxing  2    Being so restless that it's hard to sit still 2    Becoming easily annoyed or irritable  0    Feeling afraid as if something awful might happen 0    How difficult have these problems made it for you to do your work, take care of things at home, or get along with other people?  Somewhat difficult    IGNACIO Score  4             --------------------------------------  Past Medical History:   Diagnosis Date    Anemia     Anxiety     Depression 2014    Headache(784.0)       Past Surgical History:   Procedure Laterality Date    WISDOM TOOTH EXTRACTION       Family History   Problem Relation Age of Onset    Depression Mother     Anxiety disorder Mother     Cancer Father     Depression Sister     Anxiety disorder Sister     OCD Sister     Melanoma Other         Unspecifed Grandmother    Liver cancer Maternal Grandmother     No Known Problems Paternal Grandmother     No Known Problems Paternal Grandfather     No Known Problems Maternal Aunt     Substance Abuse Neg Hx         Mother/Father    Mental illness Neg Hx         Mother/Father       Italicized information carried forward from previous psychiatric notes. Information has been reviewed with the patient.  Updated information will appear in bold.      Past Psychiatric History:   Prior psychiatric diagnoses: MDD, Anxiety, ADHD  Inpatient hospitalizations: patient denies  Suicide attempts/self-harm: patient denies  Violent/aggressive behavior: patient denies  Outpatient  psychiatric providers: patient previously in the interim, patient has been seeing a virtual psychiatrist - [facility], prior to this was following with primary care provider, the Dr. Harman w/ [facility]  Past/current psychotherapy: on waiting list w/ [facility], patient denies current psychotherapy, patient previously in therapy 4 years ago   Other Services: patient denies  Psychiatric medication trial:   Multiple including  Antidepressants  Paroxetine (Paxil), Citalopram (Celexa), Duloxetine (Cymbalta), Sertraline (Zoloft), Wellbutrin, Trazodone (retrial in 2023 with minimal effectiveness)  Mood stabilizers  Lamotrigine (Lamictal), Topiramate (Topamax) - migraine  Sedative hypnotics  Xanax, Ativan  Others  BuSpar, Atarax     Substance Abuse History:  I have assessed this patient for substance use within the past 12 months.  Patient denies use of tobacco, alcohol, or illicit drugs. Patient did smoke cigarettes - less than 1 ppd previously.  Denies past legal actions or arrests secondary to substance intoxication. The patient denies prior DWIs/DUIs. Lisa does not exhibit objective evidence of substance withdrawal during today's examination nor does Lisa appear under the influence of any psychoactive substance.       Family Psychiatric History:   Mother: depression, anxiety  Sister: depression, anxiety, OCD     Social History  Early life/developmental: Denies a history of milestone/developmental delay. Denies a history of in-utero exposure to toxins/illicit substances.   Marital history: /Civil Union ,  in April (Evy)  Children: 1 stepdaughter, 10 y/o (Marisabel)  Living arrangement: Lives in a home with wife and stepdaughter, 2 Filipino shepherds and 1 cat.  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    ---------------------------------------------------  Objective:    Mental Status Evaluation:    Appearance age appropriate, casually dressed, dressed appropriately   Behavior cooperative, calm   Speech normal rate, normal volume, normal pitch   Mood euthymic   Affect constricted   Thought Processes organized, goal directed, normal rate of thoughts   Associations intact associations   Thought Content no overt delusions   Perceptual Disturbances: no auditory hallucinations, no visual hallucinations   Abnormal Thoughts  Risk Potential Suicidal ideation - None  Homicidal ideation - None  Potential for aggression - No   Orientation oriented to person, place, time/date, and situation   Memory recent and remote memory grossly intact   Consciousness alert and awake   Attention Span Concentration Span normal attention span  decreased concentration   Intellect appears to be of average intelligence   Insight fair   Judgement fair   Muscle Strength and  Gait normal muscle strength and normal muscle tone, normal gait and normal balance   Motor Activity no abnormal movements   Language no difficulty naming common objects, no difficulty repeating a phrase, no difficulty writing a sentence   Fund of Knowledge adequate knowledge of current events  adequate fund of knowledge regarding past history  adequate fund of knowledge regarding vocabulary      Suicide/Homicide Risk Assessment:    Risk of Harm to Self:  The following ratings are based on assessment at the time of the  interview  Demographic risk factors include:   Historical Risk Factors include: history of depressive symptoms, chronic anxiety symptoms, history of traumatic experiences  Recent Specific Risk Factors include: diagnosis of depression, current anxiety symptoms, worries about finances or work  Protective Factors: no current suicidal ideation, access to mental health treatment, being a parent, being , compliant with mental health treatment, having a desire to be alive, opportunities to participate in community, resiliency, responsibilities and duties to others, stable living environment, stable job, supportive family, supportive friends  Weapons: gun and handgun. The following steps have been taken to ensure weapons are properly secured: locked, secured, ammo stored separately   Based on today's assessmentLisa presents the following risk of harm to self: low     Risk of Harm to Others:  The following ratings are based on assessment at the time of the interview  Demographic Risk Factors include: under age 40.  Historical Risk Factors include: none.  Recent Specific Risk Factors include: concomitant mood disorder, multiple stressors.  Protective Factors: no current homicidal ideation, access to mental health treatment, being a parent, being , compliant with mental health treatment, opportunities to participate in community, resilience, responsibilities and duties to others, safe and stable living environment, supportive family, supportive friends  Weapons: gun and handgun. The following steps have been taken to ensure weapons are properly secured: locked, secured, ammo stored separately  Based on today's assessmentLisa presents the following risk of harm to others: low       The following interventions are recommended: no intervention changes needed    History Review:    The following portions of the patient's history were reviewed and updated as appropriate: allergies, current  medications, past family history, past medical history, past social history, past surgical history, and problem list.    Visit Vitals  /82   Pulse 101   Wt 66.5 kg (146 lb 11.2 oz)   BMI 27.36 kg/m²   OB Status Unknown   Smoking Status Former   BSA 1.66 m²      Wt Readings from Last 6 Encounters:   09/05/24 66.5 kg (146 lb 11.2 oz)   08/29/24 67.1 kg (148 lb)   03/12/24 67.9 kg (149 lb 12.8 oz)   01/09/24 64.6 kg (142 lb 6.4 oz)   11/20/23 63.9 kg (140 lb 14.4 oz)   06/13/23 62.2 kg (137 lb 3.2 oz)        Meds/Allergies    No Known Allergies  Current Outpatient Medications   Medication Instructions    butalbital-acetaminophen-caffeine-codeine (FIORICET WITH CODEINE) -38-30 MG per capsule 2 capsules, Oral, Every 6 hours PRN    drospirenone-ethinyl estradiol (Vestura) 3-0.02 MG per tablet 1 tablet, Oral, Daily    hydrOXYzine HCL (ATARAX) 25 mg, Oral, Daily at bedtime PRN    lisdexamfetamine (VYVANSE) 30 mg, Oral, Every morning    methocarbamol (ROBAXIN) 750 mg tablet As needed    ondansetron (ZOFRAN) 4 mg tablet TAKE 1 TABLET BY MOUTH EVERY 8 HOURS AS NEEDED FOR NAUSEA AND VOMITING    rimegepant sulfate (NURTEC) 75 mg TBDP Take 1 tabet PO at onset of headache.  Max of 1 dose per day.    sertraline (ZOLOFT) 100 mg, Oral, Daily        Labs & Imaging:  I have personally reviewed all pertinent laboratory tests and imaging results.   Admission on 08/29/2024, Discharged on 08/29/2024   Component Date Value Ref Range Status    SARS-CoV-2 08/29/2024 Negative  Negative Final         INFLUENZA A PCR 08/29/2024 Negative  Negative Final         INFLUENZA B PCR 08/29/2024 Negative  Negative Final         EXT Preg Test, Ur 08/29/2024 Negative   Final    Control 08/29/2024 Valid   Final       Medications Risks/Benefits:      Risks, Benefits And Possible Side Effects Of Medications:    Risks, benefits, and possible side effects of medications explained to Lisa and she verbalizes understanding and agreement for  treatment.    Controlled Medication Discussion:     Lisa has been filling controlled prescriptions on time as prescribed according to [place] Prescription Drug Monitoring Program    Psychotherapy Provided:     Individual psychotherapy provided: No     Treatment Plan:    Completed and signed during the session: Yes - with Lisa Driver MD 09/05/24    This note has been constructed using a voice recognition system. There may be translation, syntax, or grammatical errors. If you have any questions, please contact the dictating provider.

## 2024-09-03 NOTE — BH TREATMENT PLAN
TREATMENT PLAN - Medication Management        [facility]    Name and Date of Birth:  Lisadevon Zamora 34 y.o. 1990  Date of Treatment Plan: September 3, 2024  Diagnosis/Diagnoses:    1. Moderate episode of recurrent major depressive disorder (HCC)    2. Anxiety disorder, unspecified type    3. ADHD (attention deficit hyperactivity disorder), inattentive type    4. Insomnia, unspecified type        Strengths/Personal Resources for Self-Care: supportive family, taking medications as prescribed, ability to adapt to life changes, ability to communicate needs, ability to understand psychiatric illness, family ties, general fund of knowledge, independence, ability to negotiate basic needs, stable employment, work skills    Area/Areas of need: anxiety symptoms, depressive symptoms, sleep problems, attention and concentration problems, ADHD symptoms    Long Term Goal:  most of goals have been met regarding anxiey, MDD and ADHD, only issue is getting proper stimulant medication for ADHD due to shortage  Target Date: 6 months - March 3, 2025  Person/Persons responsible for completion of goal: Lisa and Russell Driver MD     Short Term Objective (s) - How will we reach this goal?:   Take medications as prescribed  Attend psychiatry appointments regularly  Target Date: 3 months  Person/Persons Responsible for Completion of Goal: Lisa     Progress Towards Goals: Continuing treatment    Treatment Modality: medication management every 1-3 months as needed and referral for individual psychotherapy  Review due 180 days from date of this plan: March 2, 2025   Expected length of service: Ongoing treatment    My physician and I have developed this plan together, and I agree to work on the goals and objectives. I understand the treatment goals that were developed for my treatment.    The treatment plan was created between Russell Driver MD and Lisa Zamora on 09/03/24 at  4:16 PM.

## 2024-09-05 ENCOUNTER — OFFICE VISIT (OUTPATIENT)
Dept: PSYCHIATRY | Facility: CLINIC | Age: 34
End: 2024-09-05
Payer: COMMERCIAL

## 2024-09-05 VITALS
WEIGHT: 146.7 LBS | SYSTOLIC BLOOD PRESSURE: 118 MMHG | HEART RATE: 101 BPM | BODY MASS INDEX: 27.36 KG/M2 | DIASTOLIC BLOOD PRESSURE: 82 MMHG

## 2024-09-05 DIAGNOSIS — F41.1 GENERALIZED ANXIETY DISORDER: ICD-10-CM

## 2024-09-05 DIAGNOSIS — G47.00 INSOMNIA, UNSPECIFIED TYPE: ICD-10-CM

## 2024-09-05 DIAGNOSIS — F90.0 ADHD (ATTENTION DEFICIT HYPERACTIVITY DISORDER), INATTENTIVE TYPE: Primary | ICD-10-CM

## 2024-09-05 PROBLEM — F33.42 MDD (MAJOR DEPRESSIVE DISORDER), RECURRENT, IN FULL REMISSION (HCC): Status: ACTIVE | Noted: 2021-08-23

## 2024-09-05 PROBLEM — F33.42 MDD (MAJOR DEPRESSIVE DISORDER), RECURRENT, IN FULL REMISSION (HCC): Status: ACTIVE | Noted: 2024-09-05

## 2024-09-05 PROCEDURE — 90792 PSYCH DIAG EVAL W/MED SRVCS: CPT

## 2024-09-05 RX ORDER — HYDROXYZINE HYDROCHLORIDE 25 MG/1
25 TABLET, FILM COATED ORAL
Qty: 30 TABLET | Refills: 1 | Status: SHIPPED | OUTPATIENT
Start: 2024-09-05 | End: 2024-11-04

## 2024-09-05 RX ORDER — LISDEXAMFETAMINE DIMESYLATE 30 MG/1
30 CAPSULE ORAL EVERY MORNING
Qty: 30 CAPSULE | Refills: 0 | Status: SHIPPED | OUTPATIENT
Start: 2024-09-05 | End: 2024-10-05

## 2024-09-30 ENCOUNTER — OFFICE VISIT (OUTPATIENT)
Dept: FAMILY MEDICINE CLINIC | Facility: CLINIC | Age: 34
End: 2024-09-30
Payer: COMMERCIAL

## 2024-09-30 VITALS
HEIGHT: 61 IN | TEMPERATURE: 98.2 F | OXYGEN SATURATION: 98 % | SYSTOLIC BLOOD PRESSURE: 122 MMHG | DIASTOLIC BLOOD PRESSURE: 78 MMHG | HEART RATE: 113 BPM | WEIGHT: 140 LBS | RESPIRATION RATE: 16 BRPM | BODY MASS INDEX: 26.43 KG/M2

## 2024-09-30 DIAGNOSIS — G43.111 INTRACTABLE MIGRAINE WITH AURA WITH STATUS MIGRAINOSUS: ICD-10-CM

## 2024-09-30 DIAGNOSIS — G43.709 CHRONIC MIGRAINE WITHOUT AURA WITHOUT STATUS MIGRAINOSUS, NOT INTRACTABLE: Primary | ICD-10-CM

## 2024-09-30 DIAGNOSIS — G44.229 CHRONIC TENSION-TYPE HEADACHE, NOT INTRACTABLE: ICD-10-CM

## 2024-09-30 PROCEDURE — 99214 OFFICE O/P EST MOD 30 MIN: CPT | Performed by: FAMILY MEDICINE

## 2024-09-30 RX ORDER — RIBOFLAVIN (VITAMIN B2) 400 MG
400 TABLET ORAL DAILY
Qty: 30 TABLET | Refills: 0 | Status: SHIPPED | OUTPATIENT
Start: 2024-09-30

## 2024-09-30 RX ORDER — BUTALBITAL, ACETAMINOPHEN, CAFFEINE AND CODEINE PHOSPHATE 50; 325; 40; 30 MG/1; MG/1; MG/1; MG/1
2 CAPSULE ORAL EVERY 6 HOURS PRN
Qty: 15 CAPSULE | Refills: 0 | Status: SHIPPED | OUTPATIENT
Start: 2024-09-30

## 2024-09-30 NOTE — PROGRESS NOTES
Ambulatory Visit  Name: Lisa Zamora      : 1990      MRN: [id number]  Encounter Provider: Tyler Vieira MD  Encounter Date: 2024   Encounter department: [facility]    Assessment & Plan  Chronic migraine without aura without status migrainosus, not intractable  Referral placed again to neurology.  Continue current migraine medications.  Already on magnesium.  Advised patient to start co-Q10 and riboflavin.  Also speak to psychiatry regarding switching to Effexor if indicated.  This could possibly help with migraines.  Orders:    Ambulatory Referral to Neurology; Future    Coenzyme Q10 (Co Q 10) 100 MG CAPS; Take 1 capsule (100 mg total) by mouth Daily at 2am    Riboflavin 400 MG TABS; Take 1 tablet (400 mg total) by mouth in the morning    Intractable migraine with aura with status migrainosus    Orders:    butalbital-acetaminophen-caffeine-codeine (FIORICET WITH CODEINE) -53-30 MG per capsule; Take 2 capsules by mouth every 6 (six) hours as needed for headaches    Chronic tension-type headache, not intractable    Orders:    rimegepant sulfate (NURTEC) 75 mg TBDP; Take 1 tabet PO at onset of headache.  Max of 1 dose per day.       History of Present Illness     Patient presents with:  Follow-up: 2 week    Patient presents today for 2-week follow-up regarding migraines.  Went to the ER following her last visit and received migraine cocktail.  Has had 1 severe migraine since her last visit.  Responded well to Nurtec.  Has not scheduled appointment yet with neurology.  Is taking magnesium at this time.          Review of Systems   Constitutional:  Negative for activity change, fatigue and fever.   Eyes:  Negative for visual disturbance.   Respiratory:  Negative for shortness of breath.    Cardiovascular:  Negative for chest pain.   Gastrointestinal:  Negative for abdominal pain, constipation, diarrhea and nausea.   Endocrine: Negative for cold intolerance and heat  "intolerance.   Musculoskeletal:  Negative for back pain.   Skin:  Negative for rash.   Neurological:  Negative for headaches.   Psychiatric/Behavioral:  Negative for confusion.            Objective     /78 (BP Location: Left arm, Patient Position: Sitting, Cuff Size: Standard)   Pulse (!) 113   Temp 98.2 °F (36.8 °C) (Temporal)   Resp 16   Ht 5' 1.4\" (1.56 m)   Wt 63.5 kg (140 lb)   SpO2 98%   BMI 26.11 kg/m²     Physical Exam  Vitals and nursing note reviewed.   Constitutional:       Appearance: Normal appearance. She is well-developed.   HENT:      Head: Normocephalic and atraumatic.   Cardiovascular:      Rate and Rhythm: Normal rate and regular rhythm.   Pulmonary:      Effort: Pulmonary effort is normal.      Breath sounds: Normal breath sounds.   Abdominal:      General: Bowel sounds are normal.      Palpations: Abdomen is soft.   Musculoskeletal:      Cervical back: Normal range of motion.   Skin:     General: Skin is warm.   Neurological:      General: No focal deficit present.      Mental Status: She is alert.   Psychiatric:         Mood and Affect: Mood normal.         Speech: Speech normal.         "

## 2024-09-30 NOTE — ASSESSMENT & PLAN NOTE
Referral placed again to neurology.  Continue current migraine medications.  Already on magnesium.  Advised patient to start co-Q10 and riboflavin.  Also speak to psychiatry regarding switching to Effexor if indicated.  This could possibly help with migraines.  Orders:    Ambulatory Referral to Neurology; Future    Coenzyme Q10 (Co Q 10) 100 MG CAPS; Take 1 capsule (100 mg total) by mouth Daily at 2am    Riboflavin 400 MG TABS; Take 1 tablet (400 mg total) by mouth in the morning

## 2024-10-07 DIAGNOSIS — F90.0 ADHD (ATTENTION DEFICIT HYPERACTIVITY DISORDER), INATTENTIVE TYPE: ICD-10-CM

## 2024-10-07 PROCEDURE — NC001 PR NO CHARGE

## 2024-10-07 RX ORDER — LISDEXAMFETAMINE DIMESYLATE 30 MG/1
30 CAPSULE ORAL EVERY MORNING
Qty: 30 CAPSULE | Refills: 0 | Status: SHIPPED | OUTPATIENT
Start: 2024-10-07 | End: 2024-11-06

## 2024-10-07 NOTE — TELEPHONE ENCOUNTER
Reason for call:   [x] Refill   [] Prior Auth  [] Other:     Office:   [] PCP/Provider -   [x] Specialty/Provider - SAP[facility] chang    Medication:   Vyvanse 30 mg. 1 qd, 30    Dose/Frequency:   [facility]    Does the patient have enough for 3 days?   [] Yes   [x] No - Send as HP to POD

## 2024-10-07 NOTE — TELEPHONE ENCOUNTER
Medication:  PDMP  09/05/2024 09/05/2024 Lisdexamfetamine Dimesylate (Capsule) 30.0 30 30 MG NA FELIPE [facility] Commercial [place] 0 / 0 [place]   1 [id number] 08/21/2024 08/21/2024 Codeine Phosphate 30 Mg Oral Capsule/caffeine 40 Mg/butalbital 50 Mg/acetaminophen 325 Mg (Capsule) 15.0 4 325 MG-50 MG-40 MG-30 MG 16.88 [facility] Commercial [place] 0 / 0 [place]   1 [id number] 07/27/2024 07/26/2024 Codeine Phosphate 30 Mg Oral Capsule/caffeine 40 Mg/butalbital 50 Mg/acetaminophen 325 Mg (Capsule) 15.0 3 325 MG-50 MG-40 MG-30 MG 22.50 [facility] Commercial [place] 0 / 0 [place]   1 [id number] 07/05/2024 07/05/2024 Codeine Phosphate 30 Mg Oral Capsule/caffeine 40 Mg/butalbital 50 Mg/acetaminophen 325 Mg (Capsule) 15.0 3 325 MG-50 MG-40 MG-30 MG 22.50 LACI PAYNE [facility] [place] / 0 [place]   1 [id number] 07/05/2024 07/05/2024 Mixed Amphetamine Salt (Capsule, Extended Release) 30.0 30 20 MG NA LASHONDA GIANG  Active agreement on file -

## 2024-10-26 DIAGNOSIS — G43.111 INTRACTABLE MIGRAINE WITH AURA WITH STATUS MIGRAINOSUS: ICD-10-CM

## 2024-10-28 RX ORDER — BUTALBITAL, ACETAMINOPHEN, CAFFEINE AND CODEINE PHOSPHATE 50; 325; 40; 30 MG/1; MG/1; MG/1; MG/1
2 CAPSULE ORAL EVERY 6 HOURS PRN
Qty: 15 CAPSULE | Refills: 0 | Status: SHIPPED | OUTPATIENT
Start: 2024-10-28

## 2024-10-28 NOTE — TELEPHONE ENCOUNTER
1 [id number] 09/30/2024 09/30/2024 Codeine Phosphate 30 Mg Oral Capsule/caffeine 40 Mg/butalbital 50 Mg/acetaminophen 325 Mg (Capsule) 15.0 3 325 MG-50 MG-40 MG-30 MG 22.50 ALFREDITO GONZALEZ [facility] [place] 0 / 0 [place]

## 2024-10-29 DIAGNOSIS — G43.111 INTRACTABLE MIGRAINE WITH AURA WITH STATUS MIGRAINOSUS: ICD-10-CM

## 2024-10-29 RX ORDER — ONDANSETRON 4 MG/1
4 TABLET, FILM COATED ORAL EVERY 8 HOURS PRN
Qty: 20 TABLET | Refills: 1 | Status: SHIPPED | OUTPATIENT
Start: 2024-10-29

## 2024-11-08 ENCOUNTER — TELEPHONE (OUTPATIENT)
Age: 34
End: 2024-11-08

## 2024-11-08 NOTE — TELEPHONE ENCOUNTER
11/08/24    Per Patient OK, ADD-ON appt scheduled for 11/12/24, 11:00 AM With Antwon Yun at the [facility].  Referral Attached (Ref ID # [id number])       Any questions, Please Review REFERRAL TRIAGE INTAKE.  Thank You.       My Apologies for the Trouble.

## 2024-11-11 ENCOUNTER — TELEPHONE (OUTPATIENT)
Dept: PSYCHIATRY | Facility: CLINIC | Age: 34
End: 2024-11-11

## 2024-11-11 NOTE — TELEPHONE ENCOUNTER
Lisa [facility] [place] [id number] hours ago (1:21 PM)       Appointment canceled for Lisa Zamora ([id number])  Visit type: MEDICATION MANAGEMENT [facility]  11/11/2024 9:30 AM (30 minutes) with Russell Driver MD in [facility] [place]     Reason for cancellation: Canceled via [facility]

## 2024-11-12 ENCOUNTER — TELEPHONE (OUTPATIENT)
Dept: NEUROLOGY | Facility: CLINIC | Age: 34
End: 2024-11-12

## 2024-11-12 ENCOUNTER — OFFICE VISIT (OUTPATIENT)
Dept: NEUROLOGY | Facility: CLINIC | Age: 34
End: 2024-11-12
Payer: COMMERCIAL

## 2024-11-12 VITALS
WEIGHT: 141.3 LBS | OXYGEN SATURATION: 98 % | DIASTOLIC BLOOD PRESSURE: 78 MMHG | SYSTOLIC BLOOD PRESSURE: 126 MMHG | HEART RATE: 91 BPM | BODY MASS INDEX: 26.35 KG/M2 | TEMPERATURE: 97.3 F

## 2024-11-12 DIAGNOSIS — G43.709 CHRONIC MIGRAINE WITHOUT AURA WITHOUT STATUS MIGRAINOSUS, NOT INTRACTABLE: Primary | ICD-10-CM

## 2024-11-12 PROCEDURE — 99205 OFFICE O/P NEW HI 60 MIN: CPT

## 2024-11-12 RX ORDER — ERENUMAB-AOOE 140 MG/ML
140 INJECTION, SOLUTION SUBCUTANEOUS
Qty: 1 ML | Refills: 11 | Status: SHIPPED | OUTPATIENT
Start: 2024-11-12

## 2024-11-12 NOTE — ASSESSMENT & PLAN NOTE
I had the pleasure of seeing Lisa today in the office at [facility] in [place].  She is presenting today for an initial new patient consultation in regard to her migraine headaches.  The patient states that she first started having headaches around the age of 10 to 11 years old.  She reports that about 30/30 days in the month with some type of headache and about 5 or 6 days out of the month with more severe debilitating headaches.  No particular time of the day in which her headaches are occurring.  She notes that the headaches can usually last a few hours at time if catching the headaches on time with medication.  She reports that Nurtec worked well initially in the beginning.  She is taking Zofran for nausea/vomiting.  She takes Fioricet with codeine occasionally for very severe headaches throughout the month only about 2 or 3 times out of the month.  The patient stated she does not have auras associated with the headaches.  She notes that she has symptoms of nausea, vomiting, photophobia, osmophobia, blurred vision/visual disturbances, lightheadedness/dizziness, tenderness at this hands feet feeling numb and tingly, and lacrimation and nasal congestion with the headaches.  No specific movements make the headaches worse.  She reports that laying down to standing up can cause the headache to get worse that she already has 1.  No specific triggers for the headaches at this time.  She notes that she saw neurologist when she was much younger in life.  She did have an MRI brain with and without contrast in 2020 which was normal.    Based on my evaluation of the patient, seems likely that she is having chronic migraine without aura and without status migrainosus.  At this time, did talk to the patient about some the options for preventative medications.  Noted that she was taking certain medications that may be effective for headache prevention but do not seem to be helping right now.  She did  take Topamax in the past which did not seem to work for the headaches.  Patient is compliant with wanting to try Aimovig for preventative therapy.  In regards to abortive therapy, advised patient to try to cut down on the amount of time she is taking ibuprofen and Tylenol throughout the week.  Certainly could be contributing to medication overuse headaches.  Ultimately would try to utilize more of the Nurtec for abortive therapy.  She can continue to use Fioricet with codeine at bedtime for really intense migraines but would limit this to no more than 2 to 3 days out of the month.  No additional imaging is required at this time, the patient's headaches have not really seem to change too much as of late.  Would like for the patient to follow-up in about 4 months time.      Orders:    Ambulatory Referral to Neurology    Erenumab-aooe (Aimovig) 140 MG/ML SOAJ; Inject 140 mg under the skin every 30 (thirty) days

## 2024-11-12 NOTE — PATIENT INSTRUCTIONS
Headache Calendar  Please maintain a headache calendar  Consider using phone applications such as Migraine Buddy or Migraine Diary    Headache/migraine treatment:     Rescue medications (for immediate treatment of a headache):   It is ok to take ibuprofen, acetaminophen or naproxen (Advil, Tylenol,  Aleve, Excedrin) if they help your headaches you should limit these to No more than 3 times a week to avoid medication overuse/rebound headaches.     For your more moderate to severe migraines take this medication early   - At this time, would certainly try to continue to encourage the patient to decrease on the daily usage of over-the-counter medications for headaches such as ibuprofen and Tylenol.  Seems likely that the patient may be experiencing some rebound or overuse headaches on daily basis related to this.  Try to slowly cut out the medication if possible.  - Once starting to become steady on the Aimovig once monthly injections, then I would recommend that she continue to try to use Nurtec 75 mg for abortive therapy of the headaches.  Would ultimately like her to utilize this medication more than having the use of the counter medication or Fioricet.  - Still if she would like can continue with the Fioricet with codeine as needed at bedtime for a real severe intense migraine headache when trying to get under control.  However, would not use this medication more than 2 or 3 days out of the month as this could cause significant medication overuse or rebound headaches moving forward.    Prescription preventive medications for headaches/migraines   (to take every day to help prevent headaches - not to take at the time of headache):  - Start Aimovig 140 mg/mL once monthly injections for migraine prevention    *Typically these types of medications take time until you see the benefit, although some may see improvement in days, often it may take weeks, especially if the medication is being titrated up to a beneficial  level. Please contact us if there are any concerns or questions regarding the medication.     Over the counter preventive supplements for headaches/migraines (if you try, try for 3 months straight)  (to take every day to help prevent headaches - not to take at the time of headache):  There are combo pills online of these - none of which regulated by FDA and double check dosing - take appropriate dose only once a day- prevent a migraine, migravent, mind ease, migrelief   [] Magnesium 400mg daily (If any diarrhea or upset stomach, decrease dose  as tolerated)  [] Riboflavin (Vitamin B2) 400mg daily (may make your urine bright/neon yellow).   - All supplements can be purchased online    Lifestyle Recommendations:  [x] SLEEP - Maintain a regular sleep schedule: Adults need at least 7-8 hours of uninterrupted a night. Maintain good sleep hygiene:  Going to bed and waking up at consistent times, avoiding excessive daytime naps, avoiding caffeinated beverages in the evening, avoid excessive stimulation in the evening and generally using bed primarily for sleeping.  One hour before bedtime would recommend turning lights down lower, decreasing your activity (may read quietly, listen to music at a low volume). When you get into bed, should eliminate all technology (no texting, emailing, playing with your phone, iPad or tablet in bed).  [x] HYDRATION - Maintain good hydration.  Drink  2L of fluid a day (4 typical small water bottles)  [x] DIET - Maintain good nutrition. In particular don't skip meals and try and eat healthy balanced meals regularly.  [x] TRIGGERS - Look for other triggers and avoid them: Limit caffeine to 1-2 cups a day or less. Avoid dietary triggers that you have noticed bring on your headaches (this could include aged cheese, peanuts, MSG, aspartame and nitrates).  [x] EXERCISE - physical exercise as we all know is good for you in many ways, and not only is good for your heart, but also is beneficial for  your mental health, cognitive health and  chronic pain/headaches. I would encourage at the least 5 days of physical exercise weekly for at least 30 minutes.     Education and Follow-up  [x] Please call with any questions or concerns. Of course if any new concerning symptoms go to the emergency department.  [x] Follow up in 4 months with Braydon LO

## 2024-11-25 DIAGNOSIS — F90.0 ADHD (ATTENTION DEFICIT HYPERACTIVITY DISORDER), INATTENTIVE TYPE: ICD-10-CM

## 2024-11-25 NOTE — TELEPHONE ENCOUNTER
Reason for call:   [x] Refill   [] Prior Auth  [] Other:     Office:   [] PCP/Provider -   [x] Specialty/Provider - PSYCHIATRY POD     Medication: lisdexamfetamine (Vyvanse) 30 MG capsule      Dose/Frequency: Take 1 capsule (30 mg total) by mouth every morning     Quantity: 30    Pharmacy: [facility]) - [place] - [address]      Does the patient have enough for 3 days?   [] Yes   [x] No - Send as HP to POD

## 2024-11-26 DIAGNOSIS — F90.0 ADHD (ATTENTION DEFICIT HYPERACTIVITY DISORDER), INATTENTIVE TYPE: ICD-10-CM

## 2024-11-26 DIAGNOSIS — G43.111 INTRACTABLE MIGRAINE WITH AURA WITH STATUS MIGRAINOSUS: ICD-10-CM

## 2024-11-26 RX ORDER — LISDEXAMFETAMINE DIMESYLATE 30 MG/1
30 CAPSULE ORAL EVERY MORNING
Qty: 30 CAPSULE | Refills: 0
Start: 2024-11-26 | End: 2024-11-26 | Stop reason: SDUPTHER

## 2024-11-26 RX ORDER — LISDEXAMFETAMINE DIMESYLATE 30 MG/1
30 CAPSULE ORAL EVERY MORNING
Qty: 30 CAPSULE | Refills: 0 | Status: SHIPPED | OUTPATIENT
Start: 2024-11-26 | End: 2024-12-26

## 2024-11-26 NOTE — TELEPHONE ENCOUNTER
[facility]       PATIENT INFORMATION     Patient: Lisa Zamora, 34 y.o., female   MRN: [id number]    PRESCRIPTION REFILL REQUEST     Per EHR, last outpatient psychiatry visit was on 9/5/24 with Russell Driver MD for intake. Next psychiatry visit is scheduled on 1/6/25 with Russell Driver MD, for medication management follow up visit.    Medication refill request received and reviewed on 11/26/24 for the following:  Lisdexamfetamine 30 mg capsule    Medication refill for #30 lisdexamfetamine 30 mg caps (controlled substance) has been approved and submitted as NO PRINT with cosign for review and refill completion by supervising attending physician.    Lisa has been filling controlled prescriptions on time as prescribed according to [place] Prescription Drug Monitoring Program         Diana Ferguson MD  Department of Psychiatry and Behavioral Health  [facility]

## 2024-11-26 NOTE — TELEPHONE ENCOUNTER
Reason for call:   [x] Refill   [] Prior Auth  [x] Other: Not a dup Pt was informed by pharmacy they did not get her refill.    Office:   [] PCP/Provider -   [x] Specialty/Provider - Russell Driver MD     Medication: (Vyvanse) 30 MG     Dose/Frequency: 1 cap  daily    Quantity: 30 caps    Pharmacy: [facility]) - [place] - [address]      Does the patient have enough for 3 days?   [] Yes   [x] No - Send as HP to POD

## 2024-11-29 RX ORDER — LISDEXAMFETAMINE DIMESYLATE 30 MG/1
30 CAPSULE ORAL EVERY MORNING
Qty: 30 CAPSULE | Refills: 0 | OUTPATIENT
Start: 2024-11-29 | End: 2024-12-29

## 2024-12-02 RX ORDER — BUTALBITAL, ACETAMINOPHEN, CAFFEINE AND CODEINE PHOSPHATE 50; 325; 40; 30 MG/1; MG/1; MG/1; MG/1
2 CAPSULE ORAL EVERY 6 HOURS PRN
Qty: 15 CAPSULE | Refills: 0 | Status: SHIPPED | OUTPATIENT
Start: 2024-12-02

## 2024-12-03 ENCOUNTER — RA CDI HCC (OUTPATIENT)
Dept: OTHER | Facility: HOSPITAL | Age: 34
End: 2024-12-03

## 2024-12-22 DIAGNOSIS — F33.1 MODERATE EPISODE OF RECURRENT MAJOR DEPRESSIVE DISORDER (HCC): ICD-10-CM

## 2024-12-22 DIAGNOSIS — F90.0 ADHD (ATTENTION DEFICIT HYPERACTIVITY DISORDER), INATTENTIVE TYPE: ICD-10-CM

## 2024-12-22 NOTE — TELEPHONE ENCOUNTER
Medication Refill Request     Name lisdexamfetamine (Vyvanse) 30 MG capsule    Dose/Frequency 30 mg Daily  Quantity 30  Verified pharmacy   [x]  Verified ordering Provider   [x]  Does patient have enough for the next 3 days? Yes [x] No []Medication Refill Request     Name sertraline (ZOLOFT) 100 mg tablet   Dose/Frequency 100 mg Daily   Quantity 90  Verified pharmacy   [x]  Verified ordering Provider   [x]  Does patient have enough for the next 3 days? Yes [x] No []

## 2024-12-23 RX ORDER — LISDEXAMFETAMINE DIMESYLATE 30 MG/1
30 CAPSULE ORAL EVERY MORNING
Qty: 30 CAPSULE | Refills: 0
Start: 2024-12-23 | End: 2024-12-30 | Stop reason: SDUPTHER

## 2024-12-23 RX ORDER — SERTRALINE HYDROCHLORIDE 100 MG/1
100 TABLET, FILM COATED ORAL DAILY
Qty: 90 TABLET | Refills: 0 | Status: SHIPPED | OUTPATIENT
Start: 2024-12-23 | End: 2025-01-06 | Stop reason: SDUPTHER

## 2024-12-23 NOTE — TELEPHONE ENCOUNTER
[facility]       PATIENT INFORMATION     Patient: Lisa Zamora, 34 y.o., female   MRN: [id number]    PRESCRIPTION REFILL REQUEST     Per EHR, last outpatient psychiatry visit was on 9/5/24 with Russell Driver MD for medication management follow up visit. Next psychiatry visit is scheduled on 1/6/25 with Dr Russell Driver for medication management follow up visit.    Medication refill request received and reviewed on 12/23/24 for the following:  #90 r-0 Zoloft 100 mg tabs; #30 r-0 lisdexamfetamine caps    Medication refill for #90 r-0 Zoloft 100 mg tabs has been approved and sent to preferred pharmacy ([facility]). Medication refill for #30 r-0 lisdexamfetamine caps (controlled substance) has been approved and submitted as NO PRINT with cosign for review and refill completion by supervising attending physician.    Lisa has been filling controlled prescriptions on time as prescribed according to [place] Prescription Drug Monitoring Program         Diana Ferguson MD  Department of Psychiatry and Behavioral Health  [facility]

## 2024-12-26 ENCOUNTER — VBI (OUTPATIENT)
Dept: ADMINISTRATIVE | Facility: OTHER | Age: 34
End: 2024-12-26

## 2024-12-26 NOTE — TELEPHONE ENCOUNTER
12/26/24 5:11 PM     Chart reviewed for Pap Smear (HPV) aka Cervical Cancer Screening ; nothing is submitted to the patient's insurance at this time.     Merlyn Harris MA   [facility]

## 2024-12-27 ENCOUNTER — TELEPHONE (OUTPATIENT)
Age: 34
End: 2024-12-27

## 2024-12-27 NOTE — TELEPHONE ENCOUNTER
Patient called the RX Refill Line. Message is being forwarded to the office.     Patient stated the pharmacy received refill of sertraline but not vyvanse.     Patient advised vyvanse was refilled on 12.23.2024 but need further authorization per chart. Patient state she is out of vyvanse.    Patient advised I will send to her doctor and the clinical staff for review at high priority. Patient verbalized understanding.    Chart does not give a confirmation receipt from pharmacy instead it shows cosign required.    Please review

## 2024-12-30 DIAGNOSIS — F90.0 ADHD (ATTENTION DEFICIT HYPERACTIVITY DISORDER), INATTENTIVE TYPE: ICD-10-CM

## 2024-12-30 RX ORDER — LISDEXAMFETAMINE DIMESYLATE 30 MG/1
30 CAPSULE ORAL EVERY MORNING
Qty: 30 CAPSULE | Refills: 0 | Status: SHIPPED | OUTPATIENT
Start: 2024-12-30 | End: 2025-01-06 | Stop reason: SDUPTHER

## 2025-01-03 DIAGNOSIS — G43.111 INTRACTABLE MIGRAINE WITH AURA WITH STATUS MIGRAINOSUS: ICD-10-CM

## 2025-01-03 NOTE — PSYCH
VIRTUAL MEDICATION MANAGEMENT NOTE        [facility] - PSYCHIATRIC ASSOCIATES    Virtual Regular Visit    Verification of patient location:    Patient is located at Home in the following state in which I hold an active license [place]  The patient was identified by name and date of birth. Lisa Zamora was informed that this is a telemedicine visit and that the visit is being conducted throughthe Epic Embedded platform. She agrees to proceed..  My office door was closed. No one else was in the room.  She acknowledged consent and understanding of privacy and security of the video platform. The patient has agreed to participate and understands they can discontinue the visit at any time.    Patient is aware this is a billable service.     Encounter provider Russell Driver MD      Name and Date of Birth:  Lisa Zamora 34 y.o. 1990 MRN: [id number]    Date of Visit: January 6, 2025    Reason for Visit: Follow-up visit regarding medication management     Visit Time    Visit Start Time: 1:00 PM  Visit Stop Time: 1:25 PM  Total Visit Duration:  25 minutes  _____________________________    Assessment & Plan   Lisa Zamora is a 34 y.o. 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 Cortes, Ph.D., with suicide risk factors including access to lethal means (guns), depression, and anxiety, with no prior suicide attempts or gestures, and no prior inpatient psychiatric hospitalizations. Lisa was personally seen and evaluated today virtually with the [facility] outpatient  clinic for follow-up regarding medication management.  On assessment, patient's generalized anxiety and depression are currently in the mild range (PHQ-9: 6) and are fairly well-controlled.  Patient also reports significant improvement in her inattentive ADHD symptoms since restarting the Vyvanse, but states that the effects tend to wear off around midday.  Patient also reports side effects including increased tiredness, weight gain and decreased libido while taking the Zoloft and asks if the dose can be reduced.  We discussed reducing the dose to 75 mg daily to see if there is reduction in the side effects while also monitoring for any increase in her anxiety or depressive symptoms.  We also discussed increasing patient's Vyvanse to 40 mg daily to better improve her focus/concentration issues at midday.  Patient was amenable to plan.      Assessment & Plan  MDD (major depressive disorder), recurrent episode, mild (HCC)    Orders:    sertraline (ZOLOFT) 25 mg tablet; Take 3 tablets (75 mg total) by mouth daily    ADHD (attention deficit hyperactivity disorder), inattentive type    Orders:    lisdexamfetamine (Vyvanse) 40 MG capsule; Take 1 capsule (40 mg total) by mouth every morning Max Daily Amount: 40 mg    Generalized anxiety disorder    Orders:    hydrOXYzine HCL (ATARAX) 25 mg tablet; Take 1 tablet (25 mg total) by mouth daily at bedtime as needed for itching    Insomnia, unspecified type    Orders:    hydrOXYzine HCL (ATARAX) 25 mg tablet; Take 1 tablet (25 mg total) by mouth daily at bedtime as needed for itching         Additional Treatment Recommendations/Precautions:    Increase Vyvanse to 40 mg every morning, for ADHD symptoms  If unavailable at the pharmacies, may consider switching back to Adderall 30 mg XR  Decrease Zoloft to 75 mg nightly, for mood and anxiety  May consider further adjustment, depending on patient's symptoms  Continue Atarax 25 mg as needed at bedtime, for nighttime insomnia and  anxiety symptoms     Follow-up for medication management in 5-6 weeks  Referral for psychotherapy previously completed, patient currently on a wait list   Recommend continued Vitamin D3 supplementation and monitoring levels  Monitor BP, HR, weight, CBC w/diff, and CMP in setting of stimulant and SSRI prescriptions  Aware of need to follow up with family physician for medical issues  Aware of 24 hour and weekend coverage for urgent situations accessed by calling [facility] main practice number    Although patient's acute lethality risk is low, long-term/chronic lethality risk is mildly elevated due to psychiatric conditions. At the current moment, Lisa is future-oriented, forward-thinking, and demonstrates ability to act in a self-preserving manner as evidenced by volitionally presenting to the clinic today, seeking treatment. At this juncture, inpatient hospitalization is not currently warranted. To mitigate future risk, patient should adhere to the recommendations of this writer, avoid alcohol/illicit substance use, utilize community-based resources and familiar support and prioritize mental health treatment.     Based on today's assessment and clinical criteria, Lisa Zamora contracts for safety and is not an imminent risk of harm to self or others. Outpatient level of care is deemed appropriate at this present time. Lisa understands that if they are no longer able to contract for safety, they need to call/contact the outpatient office including this writer, call/contact crisis and/orattend to the nearest Emergency Department for immediate evaluation.      SUBJECTIVE:  Lisa Zamora is a 34 y.o. female,  and presently living with wife (Evy) and stepdaughter, employed in long term system, with past medical history significant for migraines and vitamin D deficiency and a past psychiatric history significant for depression, anxiety, and insomnia, and recent  "diagnosis in 10/2023 of mild ADHD, predominantly inattentive type diagnosed through neuropsychological testing with [facility] - Dr. Cortes, Ph.D., with suicide risk factors including access to lethal means (guns), depression, and anxiety, with no prior suicide attempts or gestures, and no prior inpatient psychiatric hospitalizations. Lisa was personally seen and evaluated today virtually with the [facility] outpatient clinic for follow-up regarding medication management.    Patient states that she has been doing well over the past few months and that her anxiety and depressive symptoms have been fairly well-controlled.  Patient denies any significant anxiety symptoms or depressive symptoms.  Patient reports adequate sleep and appetite recently.  Regarding Atarax, patient states that she has has only had to use it a handful of times for nighttime anxiety/insomnia.  Regarding the Zoloft, patient states that she has noticed some possible side effects, including weight gain, increased tiredness and decreased libido.  Patient states that her ADHD symptoms have been more controlled as well and she reports improved concentration and focus at work.  At work.  However, patient states that her Vyvanse does tend to wear off around 12 PM-1 PM each day and that she notices a \"crash\" in which she becomes less focus and able to attend to her work tasks.    Psychiatric Review Of Systems:    Appetite: no  Adverse eating: no  Weight changes: no  Insomnia/sleeplessness: improved   Fatigue/anergy: yes  Anhedonia/lack of interest: no  Attention/concentration: Improved on Vyvanse, though patient states that the effects tend to wear off around 12-1 PM for her  Psychomotor agitation/retardation: no  Somatic symptoms: no  Anxiety/panic attack: Decreased  Erlinda/hypomania: no  Hopelessness/helplessness/worthlessness: no  Self-injurious behavior/high-risk behavior: no  Suicidal " ideation: no  Homicidal ideation: no  Auditory hallucinations: no  Visual hallucinations: no  Other perceptual disturbances: no  Delusional thinking: no  Obsessive/compulsive symptoms: no    --------------------------------------    All italicized information has been copied from previous psychiatric evaluations. Information has been reviewed with the patient.     Psychiatric History:   Prior psychiatric diagnoses: MDD, Anxiety, ADHD  Inpatient hospitalizations: patient denies  Suicide attempts/self-harm: patient denies  Violent/aggressive behavior: patient denies  Outpatient psychiatric providers: patient previously in the interim, patient has been seeing a virtual psychiatrist - [facility], prior to this was following with primary care provider, the Dr. Hamran w/ [facility]  Past/current psychotherapy: on waiting list w/ [facility], patient denies current psychotherapy, patient previously in therapy 4 years ago   Other Services: patient denies  Psychiatric medication trial:   Multiple including  Antidepressants  Paroxetine (Paxil), Citalopram (Celexa), Duloxetine (Cymbalta), Sertraline (Zoloft), Wellbutrin, Trazodone (retrial in 2023 with minimal effectiveness)  Mood stabilizers  Lamotrigine (Lamictal), Topiramate (Topamax) - migraine  Sedative hypnotics  Xanax, Ativan  Others  BuSpar, Atarax     Substance Abuse History:  I have assessed this patient for substance use within the past 12 months.  Patient denies use of tobacco, alcohol, or illicit drugs. Patient did smoke cigarettes - less than 1 ppd previously.  Denies past legal actions or arrests secondary to substance intoxication. The patient denies prior DWIs/DUIs. Lisa does not exhibit objective evidence of substance withdrawal during today's examination nor does Lisa appear under the influence of any psychoactive substance.       Family Psychiatric History:   Mother: depression, anxiety  Sister: depression, anxiety, OCD     Social History  Early  life/developmental: Denies a history of milestone/developmental delay. Denies a history of in-utero exposure to toxins/illicit substances.   Marital history: /Civil Union ,  in April (Evy)  Children: 1 stepdaughter, 10 y/o (Marisabel)  Living arrangement: Lives in a home with wife and stepdaughter, 2 Turkish shepherds and 1 cat.  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           Past Medical History:    Past Medical History:   Diagnosis Date    Anemia     Anxiety     Depression 2014    Headache(784.0)         No Known Allergies  Past Surgical History:   Procedure Laterality Date    WISDOM TOOTH EXTRACTION         Family History   Problem Relation Age of Onset    Depression Mother     Anxiety disorder Mother     Cancer Father     Depression Sister     Anxiety disorder Sister     OCD Sister     Melanoma Other         Unspecifed Grandmother    Liver cancer Maternal Grandmother     No Known Problems Paternal Grandmother     No Known Problems Paternal Grandfather     No Known Problems Maternal Aunt     Substance Abuse Neg Hx         Mother/Father    Mental illness Neg Hx         Mother/Father       OBJECTIVE:     Vital signs in last 24 hours:    There were no vitals filed for this visit.  Wt Readings from Last 6 Encounters:   11/12/24 64.1 kg (141 lb  4.8 oz)   24 63.5 kg (140 lb)   24 66.5 kg (146 lb 11.2 oz)   24 67.1 kg (148 lb)   24 67.9 kg (149 lb 12.8 oz)   24 64.6 kg (142 lb 6.4 oz)        Rating Scales  PHQ-2/9 Depression Screening    Little interest or pleasure in doing things: 0 - not at all  Feeling down, depressed, or hopeless: 0 - not at all  Trouble falling or staying asleep, or sleeping too much: 1 - several days  Feeling tired or having little energy: 1 - several days  Poor appetite or overeatin - several days  Feeling bad about yourself - or that you are a failure or have let yourself or your family down: 0 - not at all  Trouble concentrating on things, such as reading the newspaper or watching television: 2 - more than half the days  Moving or speaking so slowly that other people could have noticed. Or the opposite - being so fidgety or restless that you have been moving around a lot more than usual: 0 - not at all  Thoughts that you would be better off dead, or of hurting yourself in some way: 0 - not at all  PHQ-9 Score: 5  PHQ-9 Interpretation: Mild depression           IGNACIO-7 Flowsheet Screening      Flowsheet Row Most Recent Value   Over the last two weeks, how often have you been bothered by the following problems?     Feeling nervous, anxious, or on edge 0    Not being able to stop or control worrying 0    Worrying too much about different things 0    Trouble relaxing  1    Being so restless that it's hard to sit still 1    Becoming easily annoyed or irritable  0    Feeling afraid as if something awful might happen 0    How difficult have these problems made it for you to do your work, take care of things at home, or get along with other people?  Not difficult at all    IGNACIO Score  2             Mental Status Exam:    Appearance age appropriate, casually dressed, dressed appropriately   Behavior cooperative, calm   Speech normal rate, normal volume, normal pitch   Mood euthymic   Affect normal range and  intensity, appropriate   Thought Processes organized, goal directed, normal rate of thoughts   Associations intact associations   Thought Content no overt delusions   Perceptual Disturbances: Denies auditory or visual hallucinations and Does not appear to be responding to internal stimuli   Abnormal Thoughts  Risk Potential Denies suicidal or homicidal ideation, plan, or intent   Orientation oriented to person, place, time/date, and situation   Memory recent and remote memory grossly intact   Consciousness alert and awake   Attention Span Concentration Span attention span and concentration are age appropriate   Intellect appears to be of average intelligence   Insight fair   Judgement fair   Muscle Strength and  Gait normal muscle strength and normal muscle tone, normal gait and normal balance   Motor Activity no abnormal movements   Language no difficulty naming common objects, no difficulty repeating a phrase, no difficulty writing a sentence   Fund of Knowledge adequate knowledge of current events  adequate fund of knowledge regarding past history  adequate fund of knowledge regarding vocabulary      Laboratory Results: I have personally reviewed all pertinent laboratory/tests results    Admission on 08/29/2024, Discharged on 08/29/2024   Component Date Value Ref Range Status    SARS-CoV-2 08/29/2024 Negative  Negative Final         INFLUENZA A PCR 08/29/2024 Negative  Negative Final         INFLUENZA B PCR 08/29/2024 Negative  Negative Final         EXT Preg Test, Ur 08/29/2024 Negative   Final    Control 08/29/2024 Valid   Final             ___________________________________    History Review: The following portions of the patient's history were reviewed and updated as appropriate: allergies, current medications, past family history, past medical history, past social history, past surgical history, and problem list.    Unchanged information from this writer's previous assessment is copied and italicized;  information that has changed is bolded.  Individual psychotherapy provided: No     Treatment Plan:     Completed and signed during the session: Not applicable - Treatment Plan not due at this session    Medications Risks/Benefits:      Risks, Benefits And Possible Side Effects Of Medications:    Risks, benefits, and possible side effects of medications explained to Lisa and she verbalizes understanding and agreement for treatment.     Controlled Medication Discussion:     Lisa has been filling controlled prescriptions on time as prescribed according to [place] Prescription Drug Monitoring Program    Depression Follow-up Plan Completed: Yes     Medical Decision Making / Counseling / Coordination of Care:  The following interventions are recommended: return in 6 weeks for follow up.  Although patient's acute lethality risk is LOW, long-term/chronic lethality risk is mildly elevated given the risk factors listed above. However, at the current moment, Lisa is future-oriented, forward-thinking, and demonstrates ability to act in a self-preserving manner as evidenced by volitionally seeking psychiatric evaluation and treatment today. To mitigate future risk, patient should adhere to treatment recommendations, avoid alcohol/illicit substance use, utilize community-based resources and familiar support, and prioritize mental health treatment. The diagnosis and treatment plan were reviewed with the patient. Risks, benefits, and alternatives to treatment were discussed. The importance of medication and treatment compliance was reviewed with the patient.     ___________________________________    Russell Driver MD   01/06/25

## 2025-01-04 RX ORDER — ONDANSETRON 4 MG/1
4 TABLET, FILM COATED ORAL EVERY 8 HOURS PRN
Qty: 20 TABLET | Refills: 0 | Status: SHIPPED | OUTPATIENT
Start: 2025-01-04

## 2025-01-06 ENCOUNTER — TELEMEDICINE (OUTPATIENT)
Dept: PSYCHIATRY | Facility: CLINIC | Age: 35
End: 2025-01-06
Payer: COMMERCIAL

## 2025-01-06 DIAGNOSIS — F90.0 ADHD (ATTENTION DEFICIT HYPERACTIVITY DISORDER), INATTENTIVE TYPE: ICD-10-CM

## 2025-01-06 DIAGNOSIS — F33.0 MDD (MAJOR DEPRESSIVE DISORDER), RECURRENT EPISODE, MILD (HCC): Primary | ICD-10-CM

## 2025-01-06 DIAGNOSIS — F41.1 GENERALIZED ANXIETY DISORDER: ICD-10-CM

## 2025-01-06 DIAGNOSIS — G47.00 INSOMNIA, UNSPECIFIED TYPE: ICD-10-CM

## 2025-01-06 PROCEDURE — 99214 OFFICE O/P EST MOD 30 MIN: CPT

## 2025-01-06 RX ORDER — BUTALBITAL, ACETAMINOPHEN, CAFFEINE AND CODEINE PHOSPHATE 50; 325; 40; 30 MG/1; MG/1; MG/1; MG/1
2 CAPSULE ORAL EVERY 6 HOURS PRN
Qty: 15 CAPSULE | Refills: 0 | Status: SHIPPED | OUTPATIENT
Start: 2025-01-06

## 2025-01-06 RX ORDER — SERTRALINE HYDROCHLORIDE 25 MG/1
75 TABLET, FILM COATED ORAL DAILY
Qty: 90 TABLET | Refills: 1 | Status: SHIPPED | OUTPATIENT
Start: 2025-01-06 | End: 2025-03-07

## 2025-01-06 RX ORDER — HYDROXYZINE HYDROCHLORIDE 25 MG/1
25 TABLET, FILM COATED ORAL
Qty: 30 TABLET | Refills: 1 | Status: SHIPPED | OUTPATIENT
Start: 2025-01-06 | End: 2025-03-07

## 2025-01-06 RX ORDER — LISDEXAMFETAMINE DIMESYLATE 40 MG/1
40 CAPSULE ORAL EVERY MORNING
Qty: 30 CAPSULE | Refills: 0 | Status: SHIPPED | OUTPATIENT
Start: 2025-01-06 | End: 2025-02-05

## 2025-01-06 NOTE — ASSESSMENT & PLAN NOTE
Orders:    hydrOXYzine HCL (ATARAX) 25 mg tablet; Take 1 tablet (25 mg total) by mouth daily at bedtime as needed for itching

## 2025-01-07 ENCOUNTER — TELEPHONE (OUTPATIENT)
Dept: PSYCHIATRY | Facility: CLINIC | Age: 35
End: 2025-01-07

## 2025-01-07 NOTE — TELEPHONE ENCOUNTER
PA for Sertraline HCl 25MG tablets NOT REQUIRED     Reason (screenshot if applicable)          Patient advised by          [x] MyChart Message  [] Phone call   []LMOM  []L/M to call office as no active Communication consent on file  []Unable to leave detailed message as VM not approved on Communication consent       Pharmacy advised by    [x]Fax  []Phone call

## 2025-01-07 NOTE — TELEPHONE ENCOUNTER
Reason for call:   [x] Prior Auth  [] Other:     Caller:  [] Patient  [x] Pharmacy  Name:   Address:   Callback Number:     Medication: sertraline (ZOLOFT) 25 mg tablet     Dose/Frequency: Take 3 tablets (75 mg total) by mouth daily,     Quantity: 90 tab    Ordering Provider:   [] PCP/Provider -   [x] Speciality/Provider - Russell Driver

## 2025-01-07 NOTE — TELEPHONE ENCOUNTER
PA for sertraline 25 mg tablet SUBMITTED to [facility]     via    [x]CMM-KEY: [id number]  []Surescripts-Case ID #   []Availity-Auth ID # NDC #   []Faxed to plan   []Other website   []Phone call Case ID #     []PA sent as URGENT    All office notes, labs and other pertaining documents and studies sent. Clinical questions answered. Awaiting determination from insurance company.     Turnaround time for your insurance to make a decision on your Prior Authorization can take 7-21 business days.

## 2025-01-12 ENCOUNTER — VBI (OUTPATIENT)
Dept: ADMINISTRATIVE | Facility: OTHER | Age: 35
End: 2025-01-12

## 2025-01-12 NOTE — TELEPHONE ENCOUNTER
01/12/25 1:51 PM     Chart reviewed for   Cervical Cancer Screening    ; nothing is submitted to the patient's insurance at this time.     JO ANN PENN MA   [facility]

## 2025-01-21 ENCOUNTER — TELEPHONE (OUTPATIENT)
Dept: PSYCHIATRY | Facility: CLINIC | Age: 35
End: 2025-01-21

## 2025-01-21 NOTE — TELEPHONE ENCOUNTER
Writer called patient and LVM to call office to make f/u appt.  Transfer call to resident MR to make appt.     LS 1/9  Next 5 to 6 wks

## 2025-01-25 DIAGNOSIS — F33.0 MDD (MAJOR DEPRESSIVE DISORDER), RECURRENT EPISODE, MILD (HCC): Primary | ICD-10-CM

## 2025-01-25 PROCEDURE — NC001 PR NO CHARGE

## 2025-01-27 RX ORDER — SERTRALINE HYDROCHLORIDE 25 MG/1
75 TABLET, FILM COATED ORAL DAILY
Qty: 270 TABLET | Refills: 1 | Status: SHIPPED | OUTPATIENT
Start: 2025-01-27

## 2025-01-28 DIAGNOSIS — G47.00 INSOMNIA, UNSPECIFIED TYPE: ICD-10-CM

## 2025-01-28 DIAGNOSIS — F41.1 GENERALIZED ANXIETY DISORDER: ICD-10-CM

## 2025-01-28 RX ORDER — HYDROXYZINE HYDROCHLORIDE 25 MG/1
25 TABLET, FILM COATED ORAL
Qty: 90 TABLET | Refills: 1 | Status: SHIPPED | OUTPATIENT
Start: 2025-01-28 | End: 2025-03-29

## 2025-01-31 DIAGNOSIS — G43.709 CHRONIC MIGRAINE WITHOUT AURA WITHOUT STATUS MIGRAINOSUS, NOT INTRACTABLE: ICD-10-CM

## 2025-01-31 RX ORDER — ERENUMAB-AOOE 140 MG/ML
140 INJECTION, SOLUTION SUBCUTANEOUS
Qty: 1 ML | Refills: 11 | OUTPATIENT
Start: 2025-01-31

## 2025-02-04 DIAGNOSIS — G43.111 INTRACTABLE MIGRAINE WITH AURA WITH STATUS MIGRAINOSUS: ICD-10-CM

## 2025-02-04 RX ORDER — ONDANSETRON 4 MG/1
4 TABLET, FILM COATED ORAL EVERY 8 HOURS PRN
Qty: 20 TABLET | Refills: 0 | Status: SHIPPED | OUTPATIENT
Start: 2025-02-04

## 2025-02-05 RX ORDER — BUTALBITAL, ACETAMINOPHEN, CAFFEINE AND CODEINE PHOSPHATE 50; 325; 40; 30 MG/1; MG/1; MG/1; MG/1
2 CAPSULE ORAL EVERY 6 HOURS PRN
Qty: 15 CAPSULE | Refills: 0 | Status: SHIPPED | OUTPATIENT
Start: 2025-02-05

## 2025-02-11 ENCOUNTER — TELEPHONE (OUTPATIENT)
Age: 35
End: 2025-02-11

## 2025-02-11 DIAGNOSIS — F33.0 MDD (MAJOR DEPRESSIVE DISORDER), RECURRENT EPISODE, MILD (HCC): ICD-10-CM

## 2025-02-11 NOTE — TELEPHONE ENCOUNTER
Patient called in regard to scheduling a f/u appt for possible med change. Writer transferred call to resident MR for assistance

## 2025-02-12 RX ORDER — SERTRALINE HYDROCHLORIDE 25 MG/1
75 TABLET, FILM COATED ORAL DAILY
Qty: 270 TABLET | Refills: 1 | OUTPATIENT
Start: 2025-02-12

## 2025-02-17 ENCOUNTER — TELEMEDICINE (OUTPATIENT)
Dept: PSYCHIATRY | Facility: CLINIC | Age: 35
End: 2025-02-17
Payer: COMMERCIAL

## 2025-02-17 DIAGNOSIS — G47.00 INSOMNIA, UNSPECIFIED TYPE: ICD-10-CM

## 2025-02-17 DIAGNOSIS — F33.0 MDD (MAJOR DEPRESSIVE DISORDER), RECURRENT EPISODE, MILD (HCC): ICD-10-CM

## 2025-02-17 DIAGNOSIS — F41.1 GENERALIZED ANXIETY DISORDER: ICD-10-CM

## 2025-02-17 DIAGNOSIS — F90.0 ADHD (ATTENTION DEFICIT HYPERACTIVITY DISORDER), INATTENTIVE TYPE: Primary | ICD-10-CM

## 2025-02-17 PROCEDURE — 99214 OFFICE O/P EST MOD 30 MIN: CPT

## 2025-02-17 RX ORDER — DEXTROAMPHETAMINE SACCHARATE, AMPHETAMINE ASPARTATE, DEXTROAMPHETAMINE SULFATE AND AMPHETAMINE SULFATE 2.5; 2.5; 2.5; 2.5 MG/1; MG/1; MG/1; MG/1
10 TABLET ORAL
Qty: 30 TABLET | Refills: 0 | Status: SHIPPED | OUTPATIENT
Start: 2025-02-17 | End: 2025-03-19

## 2025-02-17 RX ORDER — LISDEXAMFETAMINE DIMESYLATE 40 MG/1
40 CAPSULE ORAL EVERY MORNING
Qty: 30 CAPSULE | Refills: 0 | Status: SHIPPED | OUTPATIENT
Start: 2025-02-17 | End: 2025-03-19

## 2025-02-17 NOTE — PSYCH
VIRTUAL MEDICATION MANAGEMENT NOTE        [facility] - PSYCHIATRIC ASSOCIATES    Virtual Regular Visit    Verification of patient location:    Patient is located at Home in the following state in which I hold an active license [place]  The patient was identified by name and date of birth. Lisa Zamora was informed that this is a telemedicine visit and that the visit is being conducted throughthe Epic Embedded platform. She agrees to proceed..  My office door was closed. No one else was in the room.  She acknowledged consent and understanding of privacy and security of the video platform. The patient has agreed to participate and understands they can discontinue the visit at any time.    Patient is aware this is a billable service.     Encounter provider Russell Driver MD      Name and Date of Birth:  Lisa Zamora 34 y.o. 1990 MRN: [id number]    Date of Visit: February 17, 2025    Reason for Visit: Follow-up visit regarding medication management     Visit Time    Visit Start Time: 2:16 PM  Visit Stop Time: 2:40 PM  Total Visit Duration:  24 minutes  _____________________________    Assessment & Plan   Lisa Zamora is a 34 y.o. 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 Cortes, Ph.D., with suicide risk factors including access to lethal means (guns), depression, and anxiety, with no prior suicide attempts or gestures, and no prior inpatient psychiatric hospitalizations. Lisa was personally seen and evaluated today virtually with the [facility] outpatient  clinic for follow-up regarding medication management.  On assessment, patient's anxiety and depressive symptoms remain in the mild range and are currently well-controlled.  Patient states that she has not noticed any appreciable worsening of her anxiety or depressive symptoms since reducing the Zoloft.  However, patient does report continued side effects with no noticeable decrease following the reduction as well.  Patient reports that the Vyvanse has been effective at controlling her attention and concentration deficits in the morning but at its effects continue to wear off around midday, even after the recent increase.  Regarding current treatment options, we discussed adding a low-dose of IR Adderall to her regimen to be taken around midday, shortly after the effects of the Vyvanse wear off to help her attention and concentration deficits later into the day.  Patient was informed risks and benefits, including risks of insomnia if taken too late in the day.  Patient states that her sleep is currently adequate.  Regarding her Zoloft, we discussed continuing at current dosage for the time being and monitor to see if patient's side effects begin to resolve.  If not, we discussed either augmenting or even switching to another agent, specifically Wellbutrin, to reduce patient's side effects while still managing her depressive symptoms.  Patient was amenable to plan.      Assessment & Plan  ADHD (attention deficit hyperactivity disorder), inattentive type    Orders:    lisdexamfetamine (Vyvanse) 40 MG capsule; Take 1 capsule (40 mg total) by mouth every morning Max Daily Amount: 40 mg    amphetamine-dextroamphetamine (ADDERALL, 10MG,) 10 mg tablet; Take 1 tablet (10 mg total) by mouth daily after lunch Max Daily Amount: 10 mg    MDD (major depressive disorder), recurrent episode, mild (HCC)    Continue Zoloft 75 mg nightly, for mood and anxiety    Generalized anxiety disorder    Continue Zoloft 75 mg nightly, for mood  and anxiety    Insomnia, unspecified type    Currently resolved.  Has prn Atarax 25 mg qhs, but has not needed to take it recently as sleep has been adequate.          Additional Treatment Recommendations/Precautions:    Add Adderall IR 10 mg every afternoon, for ongoing ADHD symptoms when morning-time Vyvanse wears off  Continue Vyvanse 40 mg every morning, for ADHD symptoms  If unavailable at the pharmacies, may consider switching back to Adderall 30 mg XR  Continue Zoloft 75 mg nightly, for mood and anxiety  On higher dosage of Zoloft patient reported increased tiredness, weight gain, and decreased libido  Monitor for continuance of side effects on the lower dosage of Zoloft.  If side effects persist, may consider augmenting or even replacing Zoloft with Wellbutrin  Continue Atarax 25 mg as needed at bedtime, for nighttime insomnia and anxiety symptoms (patient reports she has not had to use this recently for poor sleep)     Follow-up for medication management in 4 weeks virtually  Follow-up with PCP to obtain new lab work including updated TSH, vitamin D, and CMP  Referral for psychotherapy previously completed, patient currently on a wait list   Recommend continued Vitamin D3 supplementation and monitoring levels  Monitor BP, HR, weight, CBC w/diff, and CMP in setting of stimulant and SSRI prescriptions  Aware of need to follow up with family physician for medical issues  Aware of 24 hour and weekend coverage for urgent situations accessed by calling [facility] main practice number    Although patient's acute lethality risk is low, long-term/chronic lethality risk is mildly elevated due to psychiatric conditions. At the current moment, Lisa is future-oriented, forward-thinking, and demonstrates ability to act in a self-preserving manner as evidenced by volitionally presenting to the clinic today, seeking treatment. At this juncture, inpatient hospitalization is not currently warranted.  To mitigate future risk, patient should adhere to the recommendations of this writer, avoid alcohol/illicit substance use, utilize community-based resources and familiar support and prioritize mental health treatment.     Based on today's assessment and clinical criteria, Lisa Zamora contracts for safety and is not an imminent risk of harm to self or others. Outpatient level of care is deemed appropriate at this present time. Lisa understands that if they are no longer able to contract for safety, they need to call/contact the outpatient office including this writer, call/contact crisis and/orattend to the nearest Emergency Department for immediate evaluation.      SUBJECTIVE:  Lisa Zamora is a 34 y.o. 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 Cortes, Ph.D., with suicide risk factors including access to lethal means (guns), depression, and anxiety, with no prior suicide attempts or gestures, and no prior inpatient psychiatric hospitalizations. Lisa was personally seen and evaluated today virtually with the [facility] outpatient clinic for follow-up regarding medication management.    Patient states that she has not noticed any significant anxiety or depressive symptoms since last appointment after her Zoloft was reduced.  Patient's Zoloft was reduced at last appointment due to reported ongoing side effects including weight gain, increased tiredness, and decreased libido.  Patient states that she has not noticed a decrease in any of the side effects so far, following the  reduction.  Regarding other symptoms, patient does report adequate appetite and adequate sleep overnight.  Patient states that the Vyvanse has been effective at reducing her attention and concentration deficits in the early part of the day and that she is able to focus and complete tasks during that time.  Patient states that she takes her Vyvanse every morning around 7:30 AM but that the effects of her Vyvanse tend to wear off around midday, at which point her significant attention and concentration deficits return.    Psychiatric Review Of Systems:    Appetite: no  Adverse eating: no  Weight changes: no  Insomnia/sleeplessness: improved   Fatigue/anergy: yes  Anhedonia/lack of interest: no  Attention/concentration: Improved on Vyvanse, though patient states that the effects tend to wear off around 12-1 PM for her  Psychomotor agitation/retardation: no  Somatic symptoms: no  Anxiety/panic attack: Decreased  Erlinda/hypomania: no  Hopelessness/helplessness/worthlessness: no  Self-injurious behavior/high-risk behavior: no  Suicidal ideation: no  Homicidal ideation: no  Auditory hallucinations: no  Visual hallucinations: no  Other perceptual disturbances: no  Delusional thinking: no  Obsessive/compulsive symptoms: no    --------------------------------------    All italicized information has been copied from previous psychiatric evaluations. Information has been reviewed with the patient.     Psychiatric History:   Prior psychiatric diagnoses: MDD, Anxiety, ADHD  Inpatient hospitalizations: patient denies  Suicide attempts/self-harm: patient denies  Violent/aggressive behavior: patient denies  Outpatient psychiatric providers: patient previously in the interim, patient has been seeing a virtual psychiatrist - [facility], prior to this was following with primary care provider, the Dr. Harman w/ [facility]  Past/current psychotherapy: on waiting list w/ [facility], patient denies current psychotherapy, patient previously in therapy  4 years ago   Other Services: patient denies  Psychiatric medication trial:   Multiple including  Antidepressants  Paroxetine (Paxil), Citalopram (Celexa), Duloxetine (Cymbalta), Sertraline (Zoloft), Wellbutrin, Trazodone (retrial in 2023 with minimal effectiveness)  Mood stabilizers  Lamotrigine (Lamictal), Topiramate (Topamax) - migraine  Sedative hypnotics  Xanax, Ativan  Others  BuSpar, Atarax     Substance Abuse History:  I have assessed this patient for substance use within the past 12 months.  Patient denies use of tobacco, alcohol, or illicit drugs. Patient did smoke cigarettes - less than 1 ppd previously.  Denies past legal actions or arrests secondary to substance intoxication. The patient denies prior DWIs/DUIs. Lisa does not exhibit objective evidence of substance withdrawal during today's examination nor does Lisa appear under the influence of any psychoactive substance.       Family Psychiatric History:   Mother: depression, anxiety  Sister: depression, anxiety, OCD     Social History  Early life/developmental: Denies a history of milestone/developmental delay. Denies a history of in-utero exposure to toxins/illicit substances.   Marital history: /Civil Union ,  in April (Evy)  Children: 1 stepdaughter, 10 y/o (Marisabel)  Living arrangement: Lives in a home with wife and stepdaughter, 2 Swedish shepherds and 1 cat.  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           Past Medical History:    Past Medical History:   Diagnosis Date    Anemia     Anxiety     Depression 2014    Headache(784.0)         No Known Allergies  Past Surgical History:   Procedure Laterality Date    WISDOM TOOTH EXTRACTION         Family History   Problem Relation Age of Onset    Depression Mother     Anxiety disorder Mother     Cancer Father     Depression Sister     Anxiety disorder Sister     OCD Sister     Melanoma Other         Unspecifed Grandmother    Liver cancer Maternal Grandmother     No Known Problems Paternal Grandmother     No Known Problems Paternal Grandfather     No Known Problems Maternal Aunt     Substance Abuse Neg Hx         Mother/Father    Mental illness Neg Hx         Mother/Father       OBJECTIVE:     Vital signs in last 24 hours:    There were no vitals filed for this visit.  Wt Readings from Last 6 Encounters:   24 64.1 kg (141 lb 4.8 oz)   24 63.5 kg (140 lb)   24 66.5 kg (146 lb 11.2 oz)   24 67.1 kg (148 lb)   24 67.9 kg (149 lb 12.8 oz)   24 64.6 kg (142 lb 6.4 oz)        Rating Scales  PHQ-2/9 Depression Screening    Little interest or pleasure in doing things: 1 - several days  Feeling down, depressed, or hopeless: 0 - not at all  Trouble falling or staying asleep, or sleeping too much: 2 - more than half the days  Feeling tired or having little energy: 2 - more than half the days  Poor appetite or overeatin - not at all  Feeling bad about yourself - or that you are a failure or have let yourself or your family down: 0 - not at all  Trouble concentrating on things, such as reading the newspaper or watching television: 2 - more than half the days  Moving or speaking so slowly that other people could have noticed. Or the opposite - being so fidgety or restless that you have been moving around  a lot more than usual: 0 - not at all       Depression Follow-up Plan Completed: Yes     IGNACIO-7 Flowsheet Screening      Flowsheet Row Most Recent Value   Over the last two weeks, how often have you been bothered by the following problems?     Feeling nervous, anxious, or on edge 1    Not being able to stop or control worrying 1    Worrying too much about different things 1    Trouble relaxing  1    Being so restless that it's hard to sit still 1    Becoming easily annoyed or irritable  1    Feeling afraid as if something awful might happen 1    How difficult have these problems made it for you to do your work, take care of things at home, or get along with other people?  Somewhat difficult    IGNACIO Score  7               Mental Status Exam:    Appearance age appropriate, casually dressed, dressed appropriately   Behavior cooperative, calm   Speech normal rate, normal volume, normal pitch   Mood euthymic   Affect normal range and intensity, appropriate   Thought Processes organized, goal directed, normal rate of thoughts   Associations intact associations   Thought Content no overt delusions   Perceptual Disturbances: Denies auditory or visual hallucinations and Does not appear to be responding to internal stimuli   Abnormal Thoughts  Risk Potential Denies suicidal or homicidal ideation, plan, or intent   Orientation oriented to person, place, time/date, and situation   Memory recent and remote memory grossly intact   Consciousness alert and awake   Attention Span Concentration Span attention span and concentration are age appropriate   Intellect appears to be of average intelligence   Insight fair   Judgement fair   Muscle Strength and  Gait normal muscle strength and normal muscle tone, normal gait and normal balance   Motor Activity no abnormal movements   Language no difficulty naming common objects, no difficulty repeating a phrase, no difficulty writing a sentence   Fund of Knowledge adequate knowledge of current  events  adequate fund of knowledge regarding past history  adequate fund of knowledge regarding vocabulary      Laboratory Results: I have personally reviewed all pertinent laboratory/tests results    Admission on 08/29/2024, Discharged on 08/29/2024   Component Date Value Ref Range Status    SARS-CoV-2 08/29/2024 Negative  Negative Final         INFLUENZA A PCR 08/29/2024 Negative  Negative Final         INFLUENZA B PCR 08/29/2024 Negative  Negative Final         EXT Preg Test, Ur 08/29/2024 Negative   Final    Control 08/29/2024 Valid   Final             ___________________________________    History Review: The following portions of the patient's history were reviewed and updated as appropriate: allergies, current medications, past family history, past medical history, past social history, past surgical history, and problem list.    Unchanged information from this writer's previous assessment is copied and italicized; information that has changed is bolded.  Individual psychotherapy provided: No     Treatment Plan:     Completed and signed during the session: Not applicable - Treatment Plan not due at this session    Medications Risks/Benefits:      Risks, Benefits And Possible Side Effects Of Medications:    Risks, benefits, and possible side effects of medications explained to Lisa and she verbalizes understanding and agreement for treatment.     Controlled Medication Discussion:     Lisa has been filling controlled prescriptions on time as prescribed according to [place] Prescription Drug Monitoring Program    Depression Follow-up Plan Completed: Yes     Medical Decision Making / Counseling / Coordination of Care:  The following interventions are recommended: return in 1 month for follow up.  Although patient's acute lethality risk is LOW, long-term/chronic lethality risk is mildly elevated given the risk factors listed above. However, at the current moment, Lisa is future-oriented,  forward-thinking, and demonstrates ability to act in a self-preserving manner as evidenced by volitionally seeking psychiatric evaluation and treatment today. To mitigate future risk, patient should adhere to treatment recommendations, avoid alcohol/illicit substance use, utilize community-based resources and familiar support, and prioritize mental health treatment. The diagnosis and treatment plan were reviewed with the patient. Risks, benefits, and alternatives to treatment were discussed. The importance of medication and treatment compliance was reviewed with the patient.     ___________________________________    Russell Driver MD   02/17/25

## 2025-02-17 NOTE — BH TREATMENT PLAN
TREATMENT PLAN - Medication Management        [facility] - PSYCHIATRIC [facility]    Name and Date of Birth:  Lisa WALT Zamora 34 y.o. 1990  Date of Treatment Plan: February 16, 2025  Diagnosis/Diagnoses:    1. ADHD (attention deficit hyperactivity disorder), inattentive type    2. MDD (major depressive disorder), recurrent, in full remission (HCC)    3. Generalized anxiety disorder    4. Insomnia, unspecified type        Strengths/Personal Resources for Self-Care: supportive family, taking medications as prescribed, ability to adapt to life changes, ability to communicate needs, general fund of knowledge, motivation for treatment, ability to negotiate basic needs, stable employment, work skills    Area/Areas of need: anxiety symptoms, depressive symptoms, sleep problems, attention and concentration problems    Long Term Goal: ***  Target Date: 6 months - August 16, 2025  Person/Persons responsible for completion of goal: Lisa and Russell Driver MD     Short Term Objective (s) - How will we reach this goal?:   Take medications as prescribed  Attend psychiatry appointments regularly  Follow up with medical providers  Target Date: 3 months  Person/Persons Responsible for Completion of Goal: Lisa     Progress Towards Goals: Continuing treatment    Treatment Modality: medication management every 1-3 months as needed  Review due 180 days from date of this plan: August 15, 2025   Expected length of service: Ongoing treatment    My physician and I have developed this plan together, and I agree to work on the goals and objectives. I understand the treatment goals that were developed for my treatment.    The treatment plan was created between Russell Driver MD and Lisa Zamora on 02/16/25 at 11:24 PM.

## 2025-02-17 NOTE — BH CRISIS PLAN
Client Name: Lisa Zamora       Client YOB: 1990    [facility] Safety Plan      Creation Date: 4/11/24 Update Date: 4/11/24      Step 1: Warning Signs:   Warning Signs   Increased depressed mood   Isolating self            Step 2: Internal Coping Strategies:   Internal Coping Strategies   Go to the gym/physical activity   Spend time in nature   Spend time with family and pets            Step 3: People and social settings that provide distraction:   Name Contact Information   Evy (wife) number in cell phone   Mother number in cell phone    Places   Home   Mother's home           Step 4: People whom I can ask for help during a crisis:      Name Contact Information    Evy (wife) number in cell phone    Mother number in cell phone      Step 5: Professionals or agencies I can contact during a crisis:      Clinican/Agency Name Phone Emergency Contact    [facility] [phone]     Emergency services 911     [facility] services [phone]       Local Emergency Department Emergency Department Phone Emergency Department Address    [place] [address]         Crisis Phone Numbers:   Suicide Prevention Lifeline: Call or Text  988 Crisis Text Line: Text HOME to 063-220   Please note: Some [facility] do not have a separate number for Child/Adolescent specific crisis. If your county is not listed under Child/Adolescent, please call the adult number for your county      Adult Crisis Numbers: Child/Adolescent Crisis Numbers   [facility]: [phone] [facility]: [phone]   [facility]: [phone] [facility]: [phone]   [facility]: [phone] [place]: [phone]   [facility]: [phone] [place]: [phone]   [place]: [phone]   [facility]: [phone]   [facility]: [phone]   [facility]: [phone] (daytime) [phone] (after hours, weekends,  holidays)      Step 6: Making the environment safer (plan for lethal means safety):   Plan: Firearms locked and secured at home. Patient works in corrections.     Optional: What is most important to me and worth living for?      [facility] Safety Plan. Merlyn Lew and Parker Ornelas. Used with permission of the authors.

## 2025-02-20 ENCOUNTER — TELEPHONE (OUTPATIENT)
Dept: PSYCHIATRY | Facility: CLINIC | Age: 35
End: 2025-02-20

## 2025-02-20 NOTE — TELEPHONE ENCOUNTER
Writer called patient and LVM to call office to make f/u appt.  Transfer call to resident MR to make appt.     LS 2/17  Next appt 4 wk f/u

## 2025-02-24 DIAGNOSIS — F33.0 MDD (MAJOR DEPRESSIVE DISORDER), RECURRENT EPISODE, MILD (HCC): ICD-10-CM

## 2025-02-24 PROCEDURE — NC001 PR NO CHARGE

## 2025-02-24 RX ORDER — SERTRALINE HYDROCHLORIDE 25 MG/1
75 TABLET, FILM COATED ORAL DAILY
Qty: 270 TABLET | Refills: 2 | Status: SHIPPED | OUTPATIENT
Start: 2025-02-24

## 2025-03-11 DIAGNOSIS — Z30.9 ENCOUNTER FOR CONTRACEPTIVE MANAGEMENT, UNSPECIFIED TYPE: ICD-10-CM

## 2025-03-12 RX ORDER — DROSPIRENONE AND ETHINYL ESTRADIOL 0.02-3(28)
1 KIT ORAL DAILY
Qty: 84 TABLET | Refills: 1 | Status: SHIPPED | OUTPATIENT
Start: 2025-03-12

## 2025-03-13 DIAGNOSIS — F33.0 MDD (MAJOR DEPRESSIVE DISORDER), RECURRENT EPISODE, MILD (HCC): ICD-10-CM

## 2025-03-13 DIAGNOSIS — F90.0 ADHD (ATTENTION DEFICIT HYPERACTIVITY DISORDER), INATTENTIVE TYPE: ICD-10-CM

## 2025-03-13 PROCEDURE — PBNCHG PB NO CHARGE PLACEHOLDER

## 2025-03-13 RX ORDER — LISDEXAMFETAMINE DIMESYLATE 40 MG/1
40 CAPSULE ORAL EVERY MORNING
Qty: 30 CAPSULE | Refills: 0 | Status: SHIPPED | OUTPATIENT
Start: 2025-03-13 | End: 2025-03-24 | Stop reason: SDUPTHER

## 2025-03-13 RX ORDER — DEXTROAMPHETAMINE SACCHARATE, AMPHETAMINE ASPARTATE, DEXTROAMPHETAMINE SULFATE AND AMPHETAMINE SULFATE 2.5; 2.5; 2.5; 2.5 MG/1; MG/1; MG/1; MG/1
10 TABLET ORAL
Qty: 30 TABLET | Refills: 0 | Status: SHIPPED | OUTPATIENT
Start: 2025-03-13 | End: 2025-03-24 | Stop reason: SDUPTHER

## 2025-03-13 RX ORDER — SERTRALINE HYDROCHLORIDE 25 MG/1
75 TABLET, FILM COATED ORAL DAILY
Qty: 270 TABLET | Refills: 0 | OUTPATIENT
Start: 2025-03-13

## 2025-03-13 NOTE — TELEPHONE ENCOUNTER
Refill must be reviewed and completed by the office or provider. The refill is unable to be approved or denied by the medication management team.      02/17/2025 02/17/2025 Amphetamine Salt Combo (Tablet) 30.0 30 10 MG NA [facility] Commercial [place] 0 / 0 [place]   1 [id number] 02/17/2025 02/17/2025 Lisdexamfetamine Dimesylate (Capsule) 30.0 30 40 MG NA [facility] [place] Pay 0 / 0 [place]   1 [id number] 02/05/2025 02/05/2025 Codeine Phosphate 30 Mg Oral Capsule/caffeine 40 Mg/butalbital 50 Mg/acetaminophen 325 Mg (Capsule) 15.0 3 325 MG-50 MG-40 MG-30 MG 22.50 ALFREDITO GONZALEZ [facility] Commercial Insurance 0 / 0

## 2025-03-13 NOTE — TELEPHONE ENCOUNTER
Patient called and stated that the medication Zoloft needs prior auth.  Patient made appt for 3/24 at 830

## 2025-03-13 NOTE — TELEPHONE ENCOUNTER
Reason for call:   [x] Refill   [] Prior Auth  [] Other:     Office:   [] PCP/Provider -   [x] Specialty/Provider - Psych     Medication:  Sertraline    Dose/Frequency: 25 mg    Quantity: 270 tablets    Pharmacy:   [facility]/pharmacy #[id number] [facility], [address] [phone]     [facility]   Does the patient have enough for 3 days?   [x] Yes   [] No - Send as HP to POD    [facility]   Does the patient have enough for 10 days?   [] Yes   [] No - Send as HP to POD      Reason for call:   [x] Refill   [] Prior Auth  [] Other:     Office:   [] PCP/Provider -   [x] Specialty/Provider - Psych    Medication:  Lisdexamfetamine    Dose/Frequency: 40 mg    Quantity: 30 capsules    Pharmacy: [facility]/pharmacy #[address] [place] [address] Pharmacy   Does the patient have enough for 3 days?   [x] Yes   [] No - Send as HP to POD    [facility]   Does the patient have enough for 10 days?   [] Yes   [] No - Send as HP to POD      Reason for call:   [x] Refill   [] Prior Auth  [] Other:     Office:   [] PCP/Provider -   [x] Specialty/Provider - Psych    Medication: Adderall    Dose/Frequency: 10 mg    Quantity:30 tablets    Pharmacy: [facility]/pharmacy #[address] [place] [address] Pharmacy   Does the patient have enough for 3 days?   [x] Yes   [] No - Send as HP to POD    [facility]   Does the patient have enough for 10 days?   [] Yes   [] No - Send as HP to POD

## 2025-03-14 NOTE — PSYCH
MEDICATION MANAGEMENT NOTE        [facility]      Name and Date of Birth:  Lisa Zamora 34 y.o. 1990 MRN: [id number]    Date of Visit: March 24, 2025    Reason for Visit: Follow-up visit regarding medication management     Visit Time    Visit Start Time: 8:38 AM  Visit Stop Time: 9:09 AM  Total Visit Duration:  29 minutes  _____________________________    Assessment & Plan   Lisa Zamora is a 34 y.o. female,  and presently living with wife (Evy) and stepdaughter, employed in residential system, with past medical history significant for migraines and vitamin D deficiency and a past psychiatric history significant for depression, anxiety, and insomnia, and recent diagnosis in 10/2023 of mild ADHD, predominantly inattentive type diagnosed through neuropsychological testing with [facility] - Dr. Cortes, Ph.D., with suicide risk factors including access to lethal means (guns), depression, and anxiety, with no prior suicide attempts or gestures, and no prior inpatient psychiatric hospitalizations. Lisa was personally seen and evaluated today virtually with the [facility] outpatient clinic for follow-up regarding medication management.  On assessment, patient's anxiety and depressive symptoms remain in the mild range and are currently well-controlled.  Patient's ADHD symptoms have also improved and are currently in the mild range, including at midday, following the addition of Adderall IR.  However, patient continues to report side effects related to the Zoloft, particularly decreased libido.  We discussed options at this time, including augmenting with Wellbutrin or BuSpar.  As patient states the Zoloft is primarily helping with her anxiety, we agreed to pursue BuSpar at this time.  Patient was informed of risks and benefits and was amenable to plan.      Assessment & Plan  ADHD  (attention deficit hyperactivity disorder), inattentive type    Orders:    lisdexamfetamine (Vyvanse) 40 MG capsule; Take 1 capsule (40 mg total) by mouth every morning Max Daily Amount: 40 mg    amphetamine-dextroamphetamine (ADDERALL, 10MG,) 10 mg tablet; Take 1 tablet (10 mg total) by mouth daily after lunch Max Daily Amount: 10 mg    Generalized anxiety disorder    Orders:    sertraline (ZOLOFT) 25 mg tablet; Take 3 tablets (75 mg total) by mouth daily    busPIRone (BUSPAR) 7.5 mg tablet; Take 1 tablet (7.5 mg total) by mouth 2 (two) times a day    MDD (major depressive disorder), recurrent, in full remission (HCC)    Orders:    sertraline (ZOLOFT) 25 mg tablet; Take 3 tablets (75 mg total) by mouth daily         Additional Treatment Recommendations/Precautions:    Add BuSpar 7.5 mg twice daily, for anxiety and augmentation to reduce SSRI-induced sexual side effects  Continue Adderall IR 10 mg every afternoon, for reemergent ADHD symptoms when morning-time Vyvanse wears off  Continue Vyvanse 40 mg every morning, for ADHD symptoms  If unavailable at the pharmacies, may consider switching back to Adderall 30 mg XR  Continue Zoloft 75 mg nightly, for mood and anxiety  On higher dosage of Zoloft patient reported increased tiredness, weight gain, and decreased libido  Continue Atarax 25 mg as needed at bedtime, for nighttime insomnia and anxiety symptoms (patient reports using approximately 3 times a week to help with sleep)     Follow-up for medication management in 4-6 weeks   Follow-up with PCP to obtain new lab work including updated TSH, vitamin D, and CMP  Referral for psychotherapy previously completed, patient currently on a wait list   Recommend continued Vitamin D3 supplementation and monitoring levels  Monitor BP, HR, weight, CBC w/diff, and CMP in setting of stimulant and SSRI prescriptions  Aware of need to follow up with family physician for medical issues  Aware of 24 hour and weekend coverage for  urgent situations accessed by calling [facility] main practice number    Although patient's acute lethality risk is low, long-term/chronic lethality risk is mildly elevated due to psychiatric conditions. At the current moment, Lisa is future-oriented, forward-thinking, and demonstrates ability to act in a self-preserving manner as evidenced by volitionally presenting to the clinic today, seeking treatment. At this juncture, inpatient hospitalization is not currently warranted. To mitigate future risk, patient should adhere to the recommendations of this writer, avoid alcohol/illicit substance use, utilize community-based resources and familiar support and prioritize mental health treatment.     Based on today's assessment and clinical criteria, Lisa Zamora contracts for safety and is not an imminent risk of harm to self or others. Outpatient level of care is deemed appropriate at this present time. Lisa understands that if they are no longer able to contract for safety, they need to call/contact the outpatient office including this writer, call/contact crisis and/orattend to the nearest Emergency Department for immediate evaluation.      SUBJECTIVE:  Lisa Zamora is a 34 y.o. 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 Cortes, Ph.D., with suicide risk factors including access to lethal means (guns), depression, and anxiety, with no prior suicide attempts or gestures, and no prior inpatient psychiatric hospitalizations. Lisa was personally seen and evaluated today virtually with  the [facility] outpatient clinic for follow-up regarding medication management.    Patient states that she has been doing better overall in regards to her ADHD symptoms following the addition of midday Adderall IR 10 mg.  Patient states that she typically takes this medication around 12 PM after her morning time Vyvanse wears off.  Patient states that this medication has improved her attention and concentration symptoms and made it easier for her to function and complete tasks for the remainder of her workday which typically ends at 4 PM.  Patient states that she has not noticed any side effects related to this medication including any increased heart rate, heart palpitations, sweating, dizziness, decreased appetite, or insomnia.     Regarding anxiety and depression, patient denies any significant depressive symptoms recently and states the last time she experienced a significant depressive episode was approximately 10 years ago shortly before she started the Zoloft.  Patient also denies any significant anxiety symptoms recently as well.  Patient states that she does take the as needed Atarax approximately 3 times a week to help with sleep and that this has been beneficial.  Regarding the Zoloft, patient states that she continues to experience sexual side effects, particularly decreased libido even following the recent decrease in dosage.  However, patient states that medication continues to be effective at helping with her anxiety overall.      Psychiatric Review Of Systems:    Appetite: no  Adverse eating: no  Weight changes: no  Insomnia/sleeplessness: improved, reports as needed Atarax use approximately 3 times a week, which has been beneficial in helping with patient's sleep as well  Fatigue/anergy: No  Anhedonia/lack of interest: no  Attention/concentration: Improved on Vyvanse and recent addition of Adderall IR midday  Psychomotor agitation/retardation: no  Somatic symptoms:  no  Anxiety/panic attack: Denies any significant anxiety  Erlinda/hypomania: no  Hopelessness/helplessness/worthlessness: no  Self-injurious behavior/high-risk behavior: no  Suicidal ideation: no  Homicidal ideation: no  Auditory hallucinations: no  Visual hallucinations: no  Other perceptual disturbances: no  Delusional thinking: no  Obsessive/compulsive symptoms: no    --------------------------------------    All italicized information has been copied from previous psychiatric evaluations. Information has been reviewed with the patient.     Psychiatric History:   Prior psychiatric diagnoses: MDD, Anxiety, ADHD  Inpatient hospitalizations: patient denies  Suicide attempts/self-harm: patient denies  Violent/aggressive behavior: patient denies  Outpatient psychiatric providers: patient previously in the interim, patient has been seeing a virtual psychiatrist - [facility], prior to this was following with primary care provider, the Dr. Harman w/ [facility]  Past/current psychotherapy: on waiting list w/ [facility], patient denies current psychotherapy, patient previously in therapy 4 years ago   Other Services: patient denies  Psychiatric medication trial:   Multiple including  Antidepressants  Paroxetine (Paxil), Citalopram (Celexa), Duloxetine (Cymbalta), Sertraline (Zoloft), Wellbutrin, Trazodone (retrial in 2023 with minimal effectiveness)  Mood stabilizers  Lamotrigine (Lamictal), Topiramate (Topamax) - migraine  Sedative hypnotics  Xanax, Ativan  Others  BuSpar, Atarax     Substance Abuse History:  I have assessed this patient for substance use within the past 12 months.  Patient denies use of tobacco, alcohol, or illicit drugs. Patient did smoke cigarettes - less than 1 ppd previously.  Denies past legal actions or arrests secondary to substance intoxication. The patient denies prior DWIs/DUIs. Lisa does not exhibit objective evidence of substance withdrawal during today's examination nor does Lisa appear under the  influence of any psychoactive substance.       Family Psychiatric History:   Mother: depression, anxiety  Sister: depression, anxiety, OCD     Social History  Early life/developmental: Denies a history of milestone/developmental delay. Denies a history of in-utero exposure to toxins/illicit substances.   Marital history: /Civil Union ,  in April (Evy)  Children: 1 stepdaughter, 10 y/o (Marisabel)  Living arrangement: Lives in a home with wife and stepdaughter, 2 Polish shepherds and 1 cat.  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           Past Medical History:    Past Medical History:   Diagnosis Date    Anemia     Anxiety     Depression 2014    Headache(784.0)         No Known Allergies  Past Surgical History:   Procedure Laterality Date    WISDOM TOOTH EXTRACTION         Family History   Problem Relation Age of Onset    Depression Mother     Anxiety disorder Mother     Cancer Father     Depression Sister     Anxiety disorder Sister     OCD Sister     Melanoma Other         Unspecifed Grandmother    Liver cancer Maternal Grandmother     No Known Problems Paternal Grandmother     No Known Problems Paternal Grandfather     No Known Problems Maternal Aunt     Substance Abuse Neg Hx         Mother/Father    Mental illness Neg Hx          Mother/Father       OBJECTIVE:     Vital signs in last 24 hours:    There were no vitals filed for this visit.  Wt Readings from Last 6 Encounters:   24 64.1 kg (141 lb 4.8 oz)   24 63.5 kg (140 lb)   24 66.5 kg (146 lb 11.2 oz)   24 67.1 kg (148 lb)   24 67.9 kg (149 lb 12.8 oz)   24 64.6 kg (142 lb 6.4 oz)        Rating Scales  PHQ-2/9 Depression Screening    Little interest or pleasure in doing things: 0 - not at all  Feeling down, depressed, or hopeless: 0 - not at all  Trouble falling or staying asleep, or sleeping too much: 1 - several days  Feeling tired or having little energy: 1 - several days  Poor appetite or overeatin - not at all  Feeling bad about yourself - or that you are a failure or have let yourself or your family down: 0 - not at all  Trouble concentrating on things, such as reading the newspaper or watching television: 1 - several days  Moving or speaking so slowly that other people could have noticed. Or the opposite - being so fidgety or restless that you have been moving around a lot more than usual: 0 - not at all  Thoughts that you would be better off dead, or of hurting yourself in some way: 0 - not at all  PHQ-9 Score: 3  PHQ-9 Interpretation: No or Minimal depression       Depression Follow-up Plan Completed: Yes     IGNACIO-7 Flowsheet Screening      Flowsheet Row Most Recent Value   Over the last two weeks, how often have you been bothered by the following problems?     Feeling nervous, anxious, or on edge 1    Not being able to stop or control worrying 0    Worrying too much about different things 0    Trouble relaxing  1    Being so restless that it's hard to sit still 1    Becoming easily annoyed or irritable  1    Feeling afraid as if something awful might happen 0    How difficult have these problems made it for you to do your work, take care of things at home, or get along with other people?  Somewhat difficult    INGACIO Score  4                  Mental Status Exam:    Appearance age appropriate, casually dressed, dressed appropriately   Behavior cooperative, calm, pleasant   Speech normal rate, normal volume, normal pitch   Mood euthymic   Affect normal range and intensity, appropriate   Thought Processes organized, goal directed, normal rate of thoughts   Associations intact associations   Thought Content no overt delusions   Perceptual Disturbances: Denies auditory or visual hallucinations and Does not appear to be responding to internal stimuli   Abnormal Thoughts  Risk Potential Denies suicidal or homicidal ideation, plan, or intent   Orientation oriented to person, place, time/date, and situation   Memory recent and remote memory grossly intact   Consciousness alert and awake   Attention Span Concentration Span attention span and concentration are age appropriate   Intellect appears to be of average intelligence   Insight fair   Judgement fair   Muscle Strength and  Gait normal muscle strength and normal muscle tone, normal gait and normal balance   Motor Activity no abnormal movements   Language no difficulty naming common objects, no difficulty repeating a phrase, no difficulty writing a sentence   Fund of Knowledge adequate knowledge of current events  adequate fund of knowledge regarding past history  adequate fund of knowledge regarding vocabulary      Laboratory Results: I have personally reviewed all pertinent laboratory/tests results    No visits with results within 6 Month(s) from this visit.   Latest known visit with results is:   Admission on 08/29/2024, Discharged on 08/29/2024   Component Date Value Ref Range Status    SARS-CoV-2 08/29/2024 Negative  Negative Final         INFLUENZA A PCR 08/29/2024 Negative  Negative Final         INFLUENZA B PCR 08/29/2024 Negative  Negative Final         EXT Preg Test, Ur 08/29/2024 Negative   Final    Control 08/29/2024 Valid   Final             ___________________________________    History  Review: The following portions of the patient's history were reviewed and updated as appropriate: allergies, current medications, past family history, past medical history, past social history, past surgical history, and problem list.    Unchanged information from this writer's previous assessment is copied and italicized; information that has changed is bolded.  Individual psychotherapy provided: No     Treatment Plan:     Completed and signed during the session: Not applicable - Treatment Plan not due at this session    Medications Risks/Benefits:      Risks, Benefits And Possible Side Effects Of Medications:    Risks, benefits, and possible side effects of medications explained to Lisa and she verbalizes understanding and agreement for treatment.     Controlled Medication Discussion:     Lisa has been filling controlled prescriptions on time as prescribed according to [place] Prescription Drug Monitoring Program    Depression Follow-up Plan Completed: Yes     Medical Decision Making / Counseling / Coordination of Care:  The following interventions are recommended: return in 6 weeks for follow up.  Although patient's acute lethality risk is LOW, long-term/chronic lethality risk is mildly elevated given the risk factors listed above. However, at the current moment, Lisa is future-oriented, forward-thinking, and demonstrates ability to act in a self-preserving manner as evidenced by volitionally seeking psychiatric evaluation and treatment today. To mitigate future risk, patient should adhere to treatment recommendations, avoid alcohol/illicit substance use, utilize community-based resources and familiar support, and prioritize mental health treatment. The diagnosis and treatment plan were reviewed with the patient. Risks, benefits, and alternatives to treatment were discussed. The importance of medication and treatment compliance was reviewed with the patient.      ___________________________________    Russell Driver MD   03/24/25

## 2025-03-24 ENCOUNTER — OFFICE VISIT (OUTPATIENT)
Dept: PSYCHIATRY | Facility: CLINIC | Age: 35
End: 2025-03-24
Payer: COMMERCIAL

## 2025-03-24 DIAGNOSIS — F33.42 MDD (MAJOR DEPRESSIVE DISORDER), RECURRENT, IN FULL REMISSION (HCC): ICD-10-CM

## 2025-03-24 DIAGNOSIS — F90.0 ADHD (ATTENTION DEFICIT HYPERACTIVITY DISORDER), INATTENTIVE TYPE: Primary | ICD-10-CM

## 2025-03-24 DIAGNOSIS — F41.1 GENERALIZED ANXIETY DISORDER: ICD-10-CM

## 2025-03-24 PROCEDURE — 99214 OFFICE O/P EST MOD 30 MIN: CPT

## 2025-03-24 RX ORDER — DEXTROAMPHETAMINE SACCHARATE, AMPHETAMINE ASPARTATE, DEXTROAMPHETAMINE SULFATE AND AMPHETAMINE SULFATE 2.5; 2.5; 2.5; 2.5 MG/1; MG/1; MG/1; MG/1
10 TABLET ORAL
Qty: 30 TABLET | Refills: 0 | Status: SHIPPED | OUTPATIENT
Start: 2025-03-24 | End: 2025-04-23

## 2025-03-24 RX ORDER — BUSPIRONE HYDROCHLORIDE 7.5 MG/1
7.5 TABLET ORAL 2 TIMES DAILY
Qty: 60 TABLET | Refills: 1 | Status: SHIPPED | OUTPATIENT
Start: 2025-03-24 | End: 2025-05-23

## 2025-03-24 RX ORDER — LISDEXAMFETAMINE DIMESYLATE 40 MG/1
40 CAPSULE ORAL EVERY MORNING
Qty: 30 CAPSULE | Refills: 0 | Status: SHIPPED | OUTPATIENT
Start: 2025-03-24 | End: 2025-04-23

## 2025-03-24 RX ORDER — SERTRALINE HYDROCHLORIDE 25 MG/1
75 TABLET, FILM COATED ORAL DAILY
Qty: 270 TABLET | Refills: 2 | Status: SHIPPED | OUTPATIENT
Start: 2025-03-24

## 2025-03-24 NOTE — ASSESSMENT & PLAN NOTE
Orders:    sertraline (ZOLOFT) 25 mg tablet; Take 3 tablets (75 mg total) by mouth daily    busPIRone (BUSPAR) 7.5 mg tablet; Take 1 tablet (7.5 mg total) by mouth 2 (two) times a day

## 2025-04-04 DIAGNOSIS — F90.0 ADHD (ATTENTION DEFICIT HYPERACTIVITY DISORDER), INATTENTIVE TYPE: ICD-10-CM

## 2025-04-04 PROCEDURE — PBNCHG PB NO CHARGE PLACEHOLDER

## 2025-04-04 NOTE — TELEPHONE ENCOUNTER
Reason for call: NOT A DUPLICATE. [facility] is out [facility] stock. Patient requesting a different pharmacy  [] Refill   [] Prior Auth  [x] Other:     Office:   [] PCP/Provider -   [x] Specialty/Provider - PSYCH    Medication: amphetamine-dextroamphetamine (ADDERALL, 10MG,) 10 mg tablet     Dose/Frequency: Take 1 tablet (10 mg total) by mouth daily after lunch     Quantity: 30    Pharmacy: [facility]/pharmacy #[id number] - [place] - [address] [facility]   Does the patient have enough for 3 days?   [] Yes   [x] No - Send as HP to POD    [facility] Pharmacy   Does the patient have enough for 10 days?   [] Yes   [] No - Send as HP to POD

## 2025-04-04 NOTE — TELEPHONE ENCOUNTER
Not a duplicate - pharmacy change    Medication: Adderall 10mg  PDMP   03/16/2025 03/13/2025 Amphetamine Salt Combo (Tablet) 30.0 30 10 MG NA JOSELIN DREW  02/17/2025 02/17/2025 Amphetamine Salt Combo (Tablet) 30.0 30 10 MG NA JOSELIN JOBI

## 2025-04-07 RX ORDER — DEXTROAMPHETAMINE SACCHARATE, AMPHETAMINE ASPARTATE, DEXTROAMPHETAMINE SULFATE AND AMPHETAMINE SULFATE 2.5; 2.5; 2.5; 2.5 MG/1; MG/1; MG/1; MG/1
10 TABLET ORAL
Qty: 30 TABLET | Refills: 0 | Status: SHIPPED | OUTPATIENT
Start: 2025-04-07 | End: 2025-05-07

## 2025-04-14 DIAGNOSIS — F41.1 GENERALIZED ANXIETY DISORDER: ICD-10-CM

## 2025-04-14 DIAGNOSIS — F90.0 ADHD (ATTENTION DEFICIT HYPERACTIVITY DISORDER), INATTENTIVE TYPE: ICD-10-CM

## 2025-04-14 PROCEDURE — NC001 PR NO CHARGE

## 2025-04-14 RX ORDER — BUSPIRONE HYDROCHLORIDE 7.5 MG/1
7.5 TABLET ORAL 2 TIMES DAILY
Qty: 60 TABLET | Refills: 1 | Status: SHIPPED | OUTPATIENT
Start: 2025-04-14 | End: 2025-06-13

## 2025-04-14 RX ORDER — LISDEXAMFETAMINE DIMESYLATE 40 MG/1
40 CAPSULE ORAL EVERY MORNING
Qty: 30 CAPSULE | Refills: 0 | Status: SHIPPED | OUTPATIENT
Start: 2025-04-14 | End: 2025-05-14

## 2025-04-14 NOTE — TELEPHONE ENCOUNTER
03/24/2025 03/24/2025 Lisdexamfetamine Dimesylate (Capsule) 30.0 30 40 MG NA [facility] [place] / [place]   1 [id number] 03/16/2025 03/13/2025 Amphetamine Salt Combo (Tablet) 30.0 30 10 MG [facility] [place] / 0 [place]   1 [id number] 02/17/2025 02/17/2025 Amphetamine Salt Combo (Tablet) 30.0 30 10 MG NA [facility] [place] / [place]   1 [id number] 02/17/2025 02/17/2025 Lisdexamfetamine Dimesylate (Capsule) 30.0 30 40 MG [facility] [place] / 0 [place]   1 [id number] 02/05/2025 02/05/2025 Codeine Phosphate 30 Mg Oral Capsule/caffeine 40 Mg/butalbital 50 Mg/acetaminophen 325 Mg (Capsule) 15.0 3 325 MG-50 MG-40 MG-30 MG 22.50 ALFREDITO OGNZALEZ [facility] [place] / 0 [place]   1 [id number] 01/06/2025 01/06/2025 Lisdexamfetamine Dimesylate (Capsule) 30.0 30 40 MG [facility] [place]   1 [id number] 01/06/2025 01/06/2025 Codeine Phosphate 30 Mg Oral Capsule/caffein

## 2025-04-14 NOTE — TELEPHONE ENCOUNTER
Reason for call:   [x] Refill   [] Prior Auth  [] Other:     Office:   [] PCP/Provider -   [x] Specialty/Provider - Russell Driver, [facility]     Medication: Vyvanse    Dose/Frequency: 40 mg     Quantity: #30    Pharmacy: [facility]   Does the patient have enough for 3 days?   [x] Yes   [] No - Send as HP to POD    [facility]   Does the patient have enough for 10 days?   [] Yes   [] No - Send as HP to POD                  Reason for call:   [x] Refill   [] Prior Auth  [] Other:     Office:   [] PCP/Provider -   [x] Specialty/Provider -     Medication: Buspar    Dose/Frequency: 7.5 mg    Quantity: #60    Pharmacy: [facility]   Does the patient have enough for 3 days?   [x] Yes   [] No - Send as HP to POD    [facility] Pharm[facility]   Does the patient have enough for 10 days?   [] Yes   [] No - Send as HP to POD

## 2025-04-27 ENCOUNTER — TELEPHONE (OUTPATIENT)
Dept: OTHER | Facility: OTHER | Age: 35
End: 2025-04-27

## 2025-04-27 NOTE — TELEPHONE ENCOUNTER
Patient is calling regarding cancelling an appointment.     Date/Time:4/28/2025 8:30 AM     Patient was rescheduled: YES [ ] NO [X ]     Patient requesting call back to reschedule: YES [X ] NO [ ]

## 2025-05-06 ENCOUNTER — VBI (OUTPATIENT)
Dept: ADMINISTRATIVE | Facility: OTHER | Age: 35
End: 2025-05-06

## 2025-05-06 NOTE — TELEPHONE ENCOUNTER
05/06/25 7:26 AM     Chart reviewed for Pap Smear (HPV) aka Cervical Cancer Screening ; nothing is submitted to the patient's insurance at this time.     Merlyn Harris MA   [facility]

## 2025-05-13 DIAGNOSIS — F90.0 ADHD (ATTENTION DEFICIT HYPERACTIVITY DISORDER), INATTENTIVE TYPE: ICD-10-CM

## 2025-05-13 DIAGNOSIS — G43.111 INTRACTABLE MIGRAINE WITH AURA WITH STATUS MIGRAINOSUS: ICD-10-CM

## 2025-05-13 PROCEDURE — PBNCHG PB NO CHARGE PLACEHOLDER

## 2025-05-13 RX ORDER — DEXTROAMPHETAMINE SACCHARATE, AMPHETAMINE ASPARTATE, DEXTROAMPHETAMINE SULFATE AND AMPHETAMINE SULFATE 2.5; 2.5; 2.5; 2.5 MG/1; MG/1; MG/1; MG/1
10 TABLET ORAL
Qty: 30 TABLET | Refills: 0 | Status: SHIPPED | OUTPATIENT
Start: 2025-05-13 | End: 2025-06-12

## 2025-05-13 NOTE — TELEPHONE ENCOUNTER
Refill must be reviewed and completed by the office or provider. The refill is unable to be approved or denied by the medication management team.      04/25/2025 04/14/2025 Lisdexamfetamine Dimesylate (Capsule) 30.0 30 40 MG NA [facility] [place] / [place]   1 [id number] ** 04/12/2025 04/07/2025 Amphetamine Salt Combo (Tablet) 30.0 30 10 MG NA JOSELIN RUSSELL [facility] [place] / 0 [place]   1 [id number] ** 03/24/2025 03/24/2025 Lisdexamfetamine Dimesylate (Capsule) 30.0 30 40 MG NA JOSELIN RUSSELL

## 2025-05-13 NOTE — TELEPHONE ENCOUNTER
Reason for call:   [x] Refill   [] Prior Auth  [] Other:     Office:   [] PCP/Provider -   [x] Specialty/Provider - Russell Driver MD [facility]     Medication: amphetamine-dextroamphetamine (ADDERALL)    Dose/Frequency: 10mg  One Daily    Quantity: 30    Pharmacy: [id number]   Does the patient have enough for 3 days?   [x] Yes   [] No - Send as HP to POD    Mail Away Pharmacy   Does the patient have enough for 10 days?   [] Yes   [] No - Send as HP to POD

## 2025-05-14 RX ORDER — BUTALBITAL, ACETAMINOPHEN, CAFFEINE AND CODEINE PHOSPHATE 50; 325; 40; 30 MG/1; MG/1; MG/1; MG/1
2 CAPSULE ORAL EVERY 6 HOURS PRN
Qty: 15 CAPSULE | Refills: 0 | OUTPATIENT
Start: 2025-05-14

## 2025-05-22 ENCOUNTER — TELEPHONE (OUTPATIENT)
Dept: INTERNAL MEDICINE CLINIC | Facility: CLINIC | Age: 35
End: 2025-05-22

## 2025-05-22 ENCOUNTER — OFFICE VISIT (OUTPATIENT)
Dept: INTERNAL MEDICINE CLINIC | Facility: CLINIC | Age: 35
End: 2025-05-22
Payer: COMMERCIAL

## 2025-05-22 VITALS
RESPIRATION RATE: 14 BRPM | SYSTOLIC BLOOD PRESSURE: 102 MMHG | DIASTOLIC BLOOD PRESSURE: 66 MMHG | HEART RATE: 88 BPM | BODY MASS INDEX: 24.35 KG/M2 | WEIGHT: 129 LBS | OXYGEN SATURATION: 98 % | HEIGHT: 61 IN | TEMPERATURE: 97.9 F

## 2025-05-22 DIAGNOSIS — F90.0 ATTENTION DEFICIT HYPERACTIVITY DISORDER (ADHD), PREDOMINANTLY INATTENTIVE TYPE: Primary | ICD-10-CM

## 2025-05-22 DIAGNOSIS — Z12.4 SCREENING FOR CERVICAL CANCER: ICD-10-CM

## 2025-05-22 DIAGNOSIS — F41.1 GENERALIZED ANXIETY DISORDER: ICD-10-CM

## 2025-05-22 DIAGNOSIS — G43.111 INTRACTABLE MIGRAINE WITH AURA WITH STATUS MIGRAINOSUS: ICD-10-CM

## 2025-05-22 DIAGNOSIS — G44.229 CHRONIC TENSION-TYPE HEADACHE, NOT INTRACTABLE: ICD-10-CM

## 2025-05-22 DIAGNOSIS — G43.709 CHRONIC MIGRAINE WITHOUT AURA WITHOUT STATUS MIGRAINOSUS, NOT INTRACTABLE: ICD-10-CM

## 2025-05-22 DIAGNOSIS — F33.42 MDD (MAJOR DEPRESSIVE DISORDER), RECURRENT, IN FULL REMISSION (HCC): ICD-10-CM

## 2025-05-22 PROCEDURE — 99204 OFFICE O/P NEW MOD 45 MIN: CPT | Performed by: HOSPITALIST

## 2025-05-22 RX ORDER — BUTALBITAL, ACETAMINOPHEN, CAFFEINE AND CODEINE PHOSPHATE 50; 325; 40; 30 MG/1; MG/1; MG/1; MG/1
2 CAPSULE ORAL EVERY 6 HOURS PRN
Qty: 15 CAPSULE | Refills: 0 | Status: SHIPPED | OUTPATIENT
Start: 2025-05-22 | End: 2025-05-23 | Stop reason: SDUPTHER

## 2025-05-22 NOTE — TELEPHONE ENCOUNTER
I called and left message for patient to come in sooner than scheduled today since she will be a new patient. Did offer her to come in at 330pm or schedule for an earlier time tomorrow or next week.

## 2025-05-23 ENCOUNTER — TELEPHONE (OUTPATIENT)
Age: 35
End: 2025-05-23

## 2025-05-23 DIAGNOSIS — G43.111 INTRACTABLE MIGRAINE WITH AURA WITH STATUS MIGRAINOSUS: ICD-10-CM

## 2025-05-23 RX ORDER — BUTALBITAL, ACETAMINOPHEN, CAFFEINE AND CODEINE PHOSPHATE 50; 325; 40; 30 MG/1; MG/1; MG/1; MG/1
2 CAPSULE ORAL EVERY 6 HOURS PRN
Qty: 15 CAPSULE | Refills: 0 | Status: SHIPPED | OUTPATIENT
Start: 2025-05-23

## 2025-05-23 NOTE — TELEPHONE ENCOUNTER
Please have provider complete most recent OV note and PA will then be processed. Once completed, route back to POD.

## 2025-05-23 NOTE — PROGRESS NOTES
INTERNAL MEDICINE INITIAL OFFICE VISIT  [facility] - [facility] INTERNAL MEDICINE [place]    NAME: Lisa Zamora  AGE: 34 y.o. SEX: female  : 1990     DATE: 2025     Assessment and Plan:     1. Attention deficit hyperactivity disorder (ADHD), predominantly inattentive type (Primary)  Doing well.  Continue Lisdexamphetamine Dimesylate 30 mg daily.  Follows up with psych for the same    2. Chronic migraine without aura without status migrainosus, not intractable  Migraines occurring 2-3 times a month 1-2 which can be debilitating.  Follows up with neurology.  On Nurtec as abortive in addition to Fioricet as recommended by neurology and started by them.  Patient states Nurtec has been helping but still needs Fioricet 1-2 times a month..  The eventual plan is to utilize more of Nurtec for abortive therapy and wean off Fioricet has not been able.  Was tried on Topamax earlier without benefit.  Was considered for Aimovig however declined by insurance.  Also takes magnesium, riboflavin.  Follows up with neurology for the same.    3. Generalized anxiety disorder  Stable    4. MDD (major depressive disorder), recurrent, in full remission ([facility])  No issues currently.  On Zoloft    5. 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  Continue Pepcid 40 mg daily for a month.Answers submitted by the patient for this visit:  Abdominal Pain Questionnaire (Submitted on 2025)  Chief Complaint: Abdominal pain  Chronicity: recurrent  Onset: 1 to 4 weeks ago  Onset quality: gradual  Frequency: constantly  Episode duration: 7 Days  Progression since onset: waxing and waning  Pain location: epigastric region  Pain - numeric: 4/10  Pain quality: aching, burning  Radiates to: LUQ, RUQ, epigastric region  anorexia: No  belching: No  flatus: No  hematochezia: No  melena: No  weight loss: No  Aggravated by: being still, certain positions,  movement  Relieved by: nothing    - rimegepant sulfate (NURTEC) 75 mg TBDP; Take 1 tabet PO at onset of headache.  Max of 1 dose per day.  Dispense: 10 tablet; Refill: 0  - butalbital-acetaminophen-caffeine-codeine (FIORICET WITH CODEINE) -36-30 MG per capsule; Take 2 capsules by mouth every 6 (six) hours as needed for migraine  Dispense: 15 capsule; Refill: 0    Cervical cancer screening-referred  Follow-up 6 months     Chief Complaint:     Chief Complaint   Patient presents with    [facility]     Abdominal pain, burning and or aching for several months      History of Present Illness:     Evelina is an extremely pleasant 34-year-old female who presents to [facility] since our clinic is closer to her place of work.  She has a history of ADHD inattentive type-for which she is on Lisdexamphetamine Dimesylate 30 mg daily and follows with Dr. Johan zepeda,chronic migraine-for which she is on Nurtec as needed and Fioricet as needed.  Also on magnesium and riboflavin.  Was tried on Topamax earlier without much relief.  The aim is to utilize more of the Nurtec for abortive therapy and try and wean off the Fioricet over the.  Of time.  Aimovig was considered however this was declined by insurance.  In the past 2 weeks or so she had initially developed diarrhea which was felt to be secondary to norovirus.  That has resolved however still has some bloating intermittent cramps.  Has a history of depression on Zoloft.  She is no longer on BuSpar.  She otherwise remains well.  Is fairly active.  Works in the [employer] long term has a .  No sleep issues, exercises when she can.  Also on vitamin D 1000 units daily.  The following portions of the patient's history were reviewed and updated as appropriate: allergies, current medications, past family history, past medical history, past social history, past surgical history and problem list.     Review of Systems:     Review of Systems all other review of symptoms  "negative unless specified otherwise     Past Medical History:   Past Medical History[1]     Past Surgical History:   Past Surgical History[2]     Social History:   She reports that she quit smoking about 4 years ago. Her smoking use included cigarettes. She started smoking about 14 years ago. She has never used smokeless tobacco. She reports current alcohol use of about 3.0 standard drinks of alcohol per week. She reports that she does not use drugs.     Family History:   Family History[3]     Current Medications:   Current Medications[4]     Allergies:   Allergies[5]     Physical Exam:     /66 (BP Location: Left arm, Patient Position: Sitting, Cuff Size: Adult)   Pulse 88   Temp 97.9 °F (36.6 °C) (Tympanic)   Resp 14   Ht 5' 1\" (1.549 m)   Wt 58.5 kg (129 lb)   SpO2 98%   BMI 24.37 kg/m²     Physical Exam  General Appearance:    Alert, cooperative, no distress   Head:    Normocephalic, without obvious abnormality, atraumatic   Eyes:    PERRL, conjunctiva/corneas clear, EOM's intact, fundi     benign, both eyes        Neck:   Supple, no adenopathy, no JVD   Back:     Symmetric, no curvature, ROM normal, no CVA tenderness   Lungs:     Clear to auscultation bilaterally, no wheezing or rhonchi   Heart:    Regular rate and rhythm, S1 and S2 normal, no murmur, rub   or gallop   Abdomen:     Soft, non-tender, bowel sounds active    Extremities:   Extremities normal, atraumatic, no cyanosis or edema   Psych:   Normal Affect   Neurologic:   CNII-XII intact. Normal strength, sensation and reflexes       Throughout        Data:     Laboratory Results:   Radiology/Other Diagnostic Testing Results:     Petey Hagan MD  [facility] INTERNAL MEDICINE [place]                    Answers submitted by the patient for this visit:  Abdominal Pain Questionnaire (Submitted on 5/22/2025)  Chief Complaint: Abdominal pain  Chronicity: recurrent  Onset: 1 to 4 weeks ago  Onset quality: gradual  Frequency: constantly  Episode " duration: 7 Days  Progression since onset: waxing and waning  Pain location: epigastric region  Pain - numeric: 4/10  Pain quality: aching, burning  Radiates to: LUQ, RUQ, epigastric region  anorexia: No  arthralgias: No  belching: No  constipation: No  diarrhea: No  dysuria: No  fever: No  flatus: No  frequency: No  headaches: Yes  hematochezia: No  hematuria: No  melena: No  myalgias: No  nausea: No  weight loss: No  vomiting: No  Aggravated by: being still, certain positions, movement  Relieved by: nothing         [1]   Past Medical History:  Diagnosis Date    Anemia     Anxiety     Depression 2014    Headache(784.0)    [2]   Past Surgical History:  Procedure Laterality Date    WISDOM TOOTH EXTRACTION     [3]   Family History  Problem Relation Name Age of Onset    Depression Mother mom     Anxiety disorder Mother mom     Cancer Father Elie     Depression Sister sister     Anxiety disorder Sister sister     OCD Sister sister     Melanoma Other Grandmother(unspecified)         Unspecifed Grandmother    Liver cancer Maternal Grandmother      No Known Problems Paternal Grandmother      No Known Problems Paternal Grandfather      No Known Problems Maternal Aunt greg     Substance Abuse Neg Hx          Mother/Father    Mental illness Neg Hx          Mother/Father   [4]   Current Outpatient Medications:     amphetamine-dextroamphetamine (ADDERALL, 10MG,) 10 mg tablet, Take 1 tablet (10 mg total) by mouth daily after lunch Max Daily Amount: 10 mg, Disp: 30 tablet, Rfl: 0    butalbital-acetaminophen-caffeine-codeine (FIORICET WITH CODEINE) -68-30 MG per capsule, Take 2 capsules by mouth every 6 (six) hours as needed for migraine, Disp: 15 capsule, Rfl: 0    hydrOXYzine HCL (ATARAX) 25 mg tablet, TAKE 1 TABLET (25 MG TOTAL) BY MOUTH DAILY AT BEDTIME AS NEEDED FOR ITCHING, Disp: 90 tablet, Rfl: 1    lisdexamfetamine (Vyvanse) 40 MG capsule, Take 1 capsule (40 mg total) by mouth every morning Max Daily Amount: 40  mg, Disp: 30 capsule, Rfl: 0    ondansetron (ZOFRAN) 4 mg tablet, Take 1 tablet (4 mg total) by mouth every 8 (eight) hours as needed for nausea or vomiting, Disp: 20 tablet, Rfl: 0    rimegepant sulfate (NURTEC) 75 mg TBDP, Take 1 tabet PO at onset of headache.  Max of 1 dose per day., Disp: 10 tablet, Rfl: 0    sertraline (ZOLOFT) 25 mg tablet, Take 3 tablets (75 mg total) by mouth daily, Disp: 270 tablet, Rfl: 2    Vestura 3-0.02 MG per tablet, TAKE 1 TABLET BY MOUTH EVERY DAY, Disp: 84 tablet, Rfl: 1    busPIRone (BUSPAR) 7.5 mg tablet, Take 1 tablet (7.5 mg total) by mouth 2 (two) times a day, Disp: 60 tablet, Rfl: 1    Erenumab-aooe (Aimovig) 140 MG/ML SOAJ, Inject 140 mg under the skin every 30 (thirty) days, Disp: 1 mL, Rfl: 11  [5] No Known Allergies

## 2025-05-23 NOTE — TELEPHONE ENCOUNTER
PA for Rimegepant sulfate (NURTEC) 75 mg TBDP SUBMITTED to [facility]    via    []CMM-KEY:  [x]Surescripts-Case ID #: [id number]   []Availity-Auth ID #  []Faxed to plan   []Other website   []Phone call Case ID     []PA sent as URGENT    All office notes, labs and other pertaining documents and studies sent. Clinical questions answered. Awaiting determination from insurance company.     Turnaround time for your insurance to make a decision on your Prior Authorization can take 7-21 business days.

## 2025-05-23 NOTE — TELEPHONE ENCOUNTER
[facility] did not have it in stock at [facility] and needs to be sent to [facility] on [facility]          Reason for call:   [x] Refill   [] Prior Auth  [] Other:     Office:   [x] PCP/Provider - Petey Hagan [facility]   [] Specialty/Provider -     Medication: Fioricet with Codeine    Dose/Frequency: -88-30 mg     Quantity: #15    Pharmacy: [address]    Local Pharmacy   Does the patient have enough for 3 days?   [] Yes   [x] No - Send as HP to POD    Mail Away Pharmacy   Does the patient have enough for 10 days?   [] Yes   [] No - Send as HP to POD

## 2025-05-23 NOTE — TELEPHONE ENCOUNTER
PA for Rimegepant sulfate (NURTEC) 75 mg TBDP APPROVED     Date(s) approved April 23, 2025 to May 23, 2026    Case #: [id number]     Patient advised by          []MyChart Message  [x]Phone call   [x]LMOM  []L/M to call office as no active Communication consent on file  []Unable to leave detailed message as VM not approved on Communication consent       Pharmacy advised by    []Fax  [x]Phone call- Paid claim received and med will be ready for pick-up today  []Secure Chat      Approval letter scanned into Media No - Awaiting letter

## 2025-05-23 NOTE — TELEPHONE ENCOUNTER
Forwarding to PA team for review.   Nurtec 75 mg TBDP    States that pharmacy told her this needs a prior auth. Would like it sent to [address]

## 2025-05-30 DIAGNOSIS — F90.0 ADHD (ATTENTION DEFICIT HYPERACTIVITY DISORDER), INATTENTIVE TYPE: ICD-10-CM

## 2025-05-30 RX ORDER — LISDEXAMFETAMINE DIMESYLATE 40 MG/1
40 CAPSULE ORAL EVERY MORNING
Qty: 30 CAPSULE | Refills: 0 | Status: SHIPPED | OUTPATIENT
Start: 2025-05-30 | End: 2025-06-29

## 2025-05-30 RX ORDER — LISDEXAMFETAMINE DIMESYLATE 40 MG/1
40 CAPSULE ORAL EVERY MORNING
Qty: 30 CAPSULE | Refills: 0
Start: 2025-05-30 | End: 2025-05-30 | Stop reason: SDUPTHER

## 2025-05-30 NOTE — TELEPHONE ENCOUNTER
Reason for call:   [x] Refill   [] Prior Auth  [] Other:     Office:   [] PCP/Provider -   [x] Specialty/Provider - psych, Luani    Medication:   Vyvanse 40 mg, 1 qd, 30    Pharmacy:   Arnold Recinos    [facility]   Does the patient have enough for 3 days?   [] Yes   [x] No - Send as HP to POD    Mail Away Pharmacy   Does the patient have enough for 10 days?   [] Yes   [] No - Send as HP to POD

## 2025-05-30 NOTE — TELEPHONE ENCOUNTER
Covering for primary psychiatrist Dr Driver. PDMP reviewed. Rx approved and sent to attending for cosign for #30 r-0 Vyvanse 40 mg caps

## 2025-05-30 NOTE — TELEPHONE ENCOUNTER
Refill must be reviewed and completed by the office or provider. The refill is unable to be approved or denied by the medication management team.        Patient Id Prescription # Sold Filled Written Drug Label Qty Days Strength MME* Prescriber Pharmacy Payment REFILL #/Auth State Detail  1 [id number] ** 05/23/2025 05/23/2025 Codeine Phosphate 30 Mg Oral Capsule/caffeine 40 Mg/butalbital 50 Mg/acetaminophen 325 Mg (Capsule) 15.0 3 325 MG-50 MG-40 MG-30 MG 22.50 CORNELIUS TIRADO [facility] Commercial Insurance 0 / 0 [place]   1 [id number] ** 05/13/2025 05/13/2025 Amphetamine Salt Combo (Tablet) 30.0 30 10 MG [facility] Commercial Insurance 0 / 0 [place]   1 [id number] ** 04/25/2025 04/14/2025 Lisdexamfetamine Dimesylate (Capsule) 30.0 30 40 MG [facility] Commercial [facility] 0 / 0 [place]

## 2025-06-09 DIAGNOSIS — G43.111 INTRACTABLE MIGRAINE WITH AURA WITH STATUS MIGRAINOSUS: ICD-10-CM

## 2025-06-10 RX ORDER — BUTALBITAL, ACETAMINOPHEN, CAFFEINE AND CODEINE PHOSPHATE 50; 325; 40; 30 MG/1; MG/1; MG/1; MG/1
2 CAPSULE ORAL EVERY 6 HOURS PRN
Qty: 15 CAPSULE | Refills: 0 | Status: SHIPPED | OUTPATIENT
Start: 2025-06-10

## 2025-06-10 RX ORDER — ONDANSETRON 4 MG/1
4 TABLET, FILM COATED ORAL EVERY 8 HOURS PRN
Qty: 20 TABLET | Refills: 1 | Status: SHIPPED | OUTPATIENT
Start: 2025-06-10

## 2025-06-12 ENCOUNTER — TELEPHONE (OUTPATIENT)
Age: 35
End: 2025-06-12

## 2025-06-12 NOTE — TELEPHONE ENCOUNTER
Contacted patient in regards to scheduling JORDAN in [facility]. LVM to contact [phone] for scheduling    Patient will be JORDAN from  Dr. Driver to next avail  Any day after 6/30, 2np per day, 1 in am and 1 in pm, 90 min JORDAN  Transfer call or Route encounter to writer

## 2025-06-13 ENCOUNTER — APPOINTMENT (OUTPATIENT)
Dept: RADIOLOGY | Facility: CLINIC | Age: 35
End: 2025-06-13
Payer: COMMERCIAL

## 2025-06-13 ENCOUNTER — NURSE TRIAGE (OUTPATIENT)
Age: 35
End: 2025-06-13

## 2025-06-13 ENCOUNTER — OFFICE VISIT (OUTPATIENT)
Dept: URGENT CARE | Facility: CLINIC | Age: 35
End: 2025-06-13
Payer: COMMERCIAL

## 2025-06-13 VITALS
HEIGHT: 61 IN | DIASTOLIC BLOOD PRESSURE: 84 MMHG | BODY MASS INDEX: 24.35 KG/M2 | RESPIRATION RATE: 14 BRPM | OXYGEN SATURATION: 99 % | WEIGHT: 129 LBS | TEMPERATURE: 97.3 F | SYSTOLIC BLOOD PRESSURE: 113 MMHG | HEART RATE: 103 BPM

## 2025-06-13 DIAGNOSIS — J20.8 ACUTE VIRAL BRONCHITIS: Primary | ICD-10-CM

## 2025-06-13 DIAGNOSIS — F90.0 ADHD (ATTENTION DEFICIT HYPERACTIVITY DISORDER), INATTENTIVE TYPE: ICD-10-CM

## 2025-06-13 DIAGNOSIS — R05.1 ACUTE COUGH: ICD-10-CM

## 2025-06-13 PROCEDURE — NC001 PR NO CHARGE

## 2025-06-13 PROCEDURE — 71046 X-RAY EXAM CHEST 2 VIEWS: CPT

## 2025-06-13 PROCEDURE — S9083 URGENT CARE CENTER GLOBAL: HCPCS | Performed by: PHYSICIAN ASSISTANT

## 2025-06-13 PROCEDURE — G0382 LEV 3 HOSP TYPE B ED VISIT: HCPCS | Performed by: PHYSICIAN ASSISTANT

## 2025-06-13 RX ORDER — BROMPHENIRAMINE MALEATE, PSEUDOEPHEDRINE HYDROCHLORIDE, AND DEXTROMETHORPHAN HYDROBROMIDE 2; 30; 10 MG/5ML; MG/5ML; MG/5ML
SYRUP ORAL
COMMUNITY
Start: 2025-06-05 | End: 2025-06-13 | Stop reason: ALTCHOICE

## 2025-06-13 RX ORDER — PREDNISONE 20 MG/1
TABLET ORAL
COMMUNITY
Start: 2025-06-05

## 2025-06-13 RX ORDER — DEXTROAMPHETAMINE SACCHARATE, AMPHETAMINE ASPARTATE, DEXTROAMPHETAMINE SULFATE AND AMPHETAMINE SULFATE 2.5; 2.5; 2.5; 2.5 MG/1; MG/1; MG/1; MG/1
10 TABLET ORAL
Qty: 30 TABLET | Refills: 0 | Status: SHIPPED | OUTPATIENT
Start: 2025-06-13 | End: 2025-07-13

## 2025-06-13 RX ORDER — BENZONATATE 100 MG/1
100 CAPSULE ORAL 3 TIMES DAILY PRN
Qty: 20 CAPSULE | Refills: 0 | Status: SHIPPED | OUTPATIENT
Start: 2025-06-13

## 2025-06-13 RX ORDER — ALBUTEROL SULFATE 90 UG/1
2 INHALANT RESPIRATORY (INHALATION) EVERY 6 HOURS PRN
Qty: 8.5 G | Refills: 0 | Status: SHIPPED | OUTPATIENT
Start: 2025-06-13

## 2025-06-13 NOTE — PATIENT INSTRUCTIONS
Take Tessalon Perles as prescribed as needed.  Recommend taking morning and evening.  Take mycotoxin as prescribed.  Make sure you are keeping in a locked cabinet and discard after symptoms have resolved.  Your inhaler as prescribed as needed for coughing fits or shortness of breath.  Note the symptoms are most consistent with a viral bronchitis.  The cough or this can be persistent for 6 to 8 weeks.  Follow-up with PCP in 3 to 5 days if symptoms or not improving.  If symptoms worsen or new symptoms develop report to the emergency room.

## 2025-06-13 NOTE — PROGRESS NOTES
"Name: Lisa Zamora      : 1990      MRN: [id number]  Encounter Provider: Janelle Chase PA-C  Encounter Date: 2025   Encounter department: [facility]  :  Assessment & Plan  Acute cough    Orders:    XR chest pa and lateral; Future    Patient presents with nonproductive cough for 2 to 3 weeks duration.  Has been treated with Bromfed and a steroid with no improvement.  Suspect viral bronchitis will obtain chest x-ray due to duration of symptoms to rule out pneumonia.  This x-ray is negative for acute cardiopulmonary disease Rx for Tessalon Perles, albuterol inhaler and guaifenesin codeine provided.    History of Present Illness   Cough  Associated symptoms include shortness of breath. Pertinent negatives include no chest pain, chills, fever, postnasal drip or rhinorrhea.     Lisa Zamora is a 34 y.o. female who presents complaint of dry cough for 2 to 3 weeks duration.  Was started on Bromfed and prednisone by her PCP with no improvement.  Patient reports that cough is repetitive especially when she lays down and is making it difficult for her to sleep at night.  Patient denies runny nose, sinus congestion, fever, and chills.  History obtained from: patient    Review of Systems   Constitutional:  Negative for chills and fever.   HENT:  Negative for congestion, postnasal drip and rhinorrhea.    Respiratory:  Positive for cough and shortness of breath. Negative for chest tightness.    Cardiovascular:  Negative for chest pain.     Medical History Reviewed by provider this encounter:  Meds  Problems     .     Objective   /84   Pulse 103   Temp (!) 97.3 °F (36.3 °C)   Resp 14   Ht 5' 1\" (1.549 m)   Wt 58.5 kg (129 lb)   SpO2 99%   BMI 24.37 kg/m²      Physical Exam  Vitals and nursing note reviewed.   Constitutional:       General: She is awake. She is not in acute distress.     Appearance: Normal appearance. She is well-developed and well-groomed. " She is not ill-appearing, toxic-appearing or diaphoretic.   HENT:      Head: Normocephalic and atraumatic.      Right Ear: Hearing, tympanic membrane, ear canal and external ear normal.      Left Ear: Hearing, tympanic membrane, ear canal and external ear normal.      Nose: Nose normal. No mucosal edema, congestion or rhinorrhea.      Right Turbinates: Not enlarged, swollen or pale.      Left Turbinates: Not enlarged, swollen or pale.      Right Sinus: No maxillary sinus tenderness or frontal sinus tenderness.      Left Sinus: No maxillary sinus tenderness or frontal sinus tenderness.      Mouth/Throat:      Lips: Pink. No lesions.      Mouth: Mucous membranes are moist.      Dentition: Normal dentition.      Tongue: No lesions. Tongue does not deviate from midline.      Palate: No mass and lesions.      Pharynx: Oropharynx is clear. Uvula midline.      Tonsils: No tonsillar exudate or tonsillar abscesses.     Eyes:      General: Lids are normal. Gaze aligned appropriately.      Extraocular Movements: Extraocular movements intact.      Conjunctiva/sclera: Conjunctivae normal.      Pupils: Pupils are equal.       Cardiovascular:      Rate and Rhythm: Normal rate and regular rhythm.      Heart sounds: Normal heart sounds, S1 normal and S2 normal.   Pulmonary:      Effort: Pulmonary effort is normal.      Breath sounds: Normal breath sounds.      Comments: Pt speaking in full sentences with no increased respiratory effort.   No audible wheezing.     Lymphadenopathy:      Cervical: No cervical adenopathy.     Skin:     Coloration: Skin is not pale.     Neurological:      Mental Status: She is alert and easily aroused.     Psychiatric:         Behavior: Behavior is cooperative.

## 2025-06-13 NOTE — TELEPHONE ENCOUNTER
Reason for call:   [x] Refill   [] Prior Auth  [] Other:     Office:   [] PCP/Provider -   [x] Specialty/Provider - psych     Medication: amphetamine     Dose/Frequency: 10 mg take daily after lunch     Quantity: 30    Pharmacy: [facility]    Local Pharmacy   Does the patient have enough for 3 days?   [] Yes   [x] No - Send as HP to POD- has 1 pill left    Mail Away Pharmacy   Does the patient have enough for 10 days?   [] Yes   [] No - Send as HP to POD

## 2025-06-13 NOTE — TELEPHONE ENCOUNTER
"    REASON FOR CONVERSATION: Cough    SYMPTOMS: dry cough    OTHER HEALTH INFORMATION: went to [facility] on 6/5. Given Brompheniramine and Prednisone. Pt finished both scripts with no improvement.  Patient can't sleep due to constant coughing.    PROTOCOL DISPOSITION: Go to Urgent Care Now (overriding See Today in Office)    CARE ADVICE PROVIDED: Advised [facility]    PRACTICE FOLLOW-UP: no      Reason for Disposition   Continuous (nonstop) coughing interferes with work or school and no improvement using cough treatment per Care Advice    Answer Assessment - Initial Assessment Questions  1. ONSET: \"When did the cough begin?\"       About two weeks ago  2. SEVERITY: \"How bad is the cough today?\"       Moderate to severe  3. SPUTUM: \"Describe the color of your sputum\" (e.g., none, dry cough; clear, white, yellow, green)      Non-productive  4. HEMOPTYSIS: \"Are you coughing up any blood?\" If so ask: \"How much?\" (e.g., flecks, streaks, tablespoons, etc.)      no  5. DIFFICULTY BREATHING: \"Are you having difficulty breathing?\" If Yes, ask: \"How bad is it?\" (e.g., mild, moderate, severe)       no  6. FEVER: \"Do you have a fever?\" If Yes, ask: \"What is your temperature, how was it measured, and when did it start?\"      no  7. CARDIAC HISTORY: \"Do you have any history of heart disease?\" (e.g., heart attack, congestive heart failure)       no  8. LUNG HISTORY: \"Do you have any history of lung disease?\"  (e.g., pulmonary embolus, asthma, emphysema)      no  9. PE RISK FACTORS: \"Do you have a history of blood clots?\" (or: recent major surgery, recent prolonged travel, bedridden)      no  10. OTHER SYMPTOMS: \"Do you have any other symptoms?\" (e.g., runny nose, wheezing, chest pain)        none    Protocols used: Cough - Chronic-Adult-[place]    "

## 2025-06-13 NOTE — TELEPHONE ENCOUNTER
Refill cannot be delegated    1 [id number] ** 06/10/2025 06/10/2025 Codeine Phosphate 30 Mg Oral Capsule/caffeine 40 Mg/butalbital 50 Mg/acetaminophen 325 Mg (Capsule) 15.0 3 325 MG-50 MG-40 MG-30 MG 22.50 [facility] Commercial Insurance 0 / 0 [place]   1 [id number] ** 05/30/2025 05/30/2025 Lisdexamfetamine Dimesylate (Capsule) 30.0 30 40 MG NA LOVE DUONG [facility] Commercial Insurance 0 / 0 [place]   1 [id number] ** 05/23/2025 05/23/2025 Codeine Phosphate 30 Mg Oral Capsule/caffeine 40 Mg/butalbital 50 Mg/acetaminophen 325 Mg (Capsule) 15.0 3 325 MG-50 MG-40 MG-30 MG 22.50 [facility] Commercial Insurance 0 / 0 [place]   1 [id number] ** 05/13/2025 05/13/2025 Amphetamine Salt Combo (Tablet) 30.0 30 10 MG NA [facility] Commercial Insurance 0 / 0 [place]   1 [id number] ** 04/25/2025 04/14/2025 Lisdexamfetamine Dimesylate (Capsule) 30.0 30 40 MG NA [facility] Commercial Insurance 0 / 0 [place]   1 [id number] ** 04/12/2025 04/07/2025 Amphetamine Salt Combo (Tablet) 30.0 30 10 MG NA [facility] Commercial Insurance 0 / 0 [place]   1 [id number] ** 03/24/2025 03/24/2025 Lisdexamfetamine Dimesylate (Capsule) 30.0 30 40 MG NA [facility] Commercial Insurance 0 / 0 [place]   1 [id number] ** 03/16/2025 03/13/2025 Amphetamine Salt Combo (Tablet) 30.0 30 10 MG [facility] Commercial Insurance 0 / 0 [place]   1 [id number] ** 02/17/2025 02/17/2025 Amphetamine Salt Combo (Tablet) 30.0 30 10 MG NA JOSELIN [facility] Commercial Insurance 0 / 0 [place]   1 [id number] ** 02/17/2025 02/17/2025 Lisdexamfetamine Dimesylate (Capsule) 30.0 30 40 MG NA [facility] Private Pay 0 / 0 [place]

## 2025-07-01 DIAGNOSIS — G43.111 INTRACTABLE MIGRAINE WITH AURA WITH STATUS MIGRAINOSUS: ICD-10-CM

## 2025-07-01 RX ORDER — BUTALBITAL, ACETAMINOPHEN, CAFFEINE AND CODEINE PHOSPHATE 50; 325; 40; 30 MG/1; MG/1; MG/1; MG/1
1 CAPSULE ORAL EVERY 6 HOURS PRN
Qty: 15 CAPSULE | Refills: 0 | Status: SHIPPED | OUTPATIENT
Start: 2025-07-01

## 2025-07-01 RX ORDER — BUTALBITAL, ACETAMINOPHEN, CAFFEINE AND CODEINE PHOSPHATE 50; 325; 40; 30 MG/1; MG/1; MG/1; MG/1
2 CAPSULE ORAL EVERY 6 HOURS PRN
Qty: 15 CAPSULE | Refills: 0 | OUTPATIENT
Start: 2025-07-01

## 2025-07-08 DIAGNOSIS — F90.0 ADHD (ATTENTION DEFICIT HYPERACTIVITY DISORDER), INATTENTIVE TYPE: ICD-10-CM

## 2025-07-08 RX ORDER — DEXTROAMPHETAMINE SACCHARATE, AMPHETAMINE ASPARTATE, DEXTROAMPHETAMINE SULFATE AND AMPHETAMINE SULFATE 2.5; 2.5; 2.5; 2.5 MG/1; MG/1; MG/1; MG/1
10 TABLET ORAL
Qty: 30 TABLET | Refills: 0 | Status: SHIPPED | OUTPATIENT
Start: 2025-07-08 | End: 2025-08-07

## 2025-07-08 RX ORDER — LISDEXAMFETAMINE DIMESYLATE 40 MG/1
40 CAPSULE ORAL EVERY MORNING
Qty: 30 CAPSULE | Refills: 0 | Status: SHIPPED | OUTPATIENT
Start: 2025-07-08 | End: 2025-08-07

## 2025-07-08 NOTE — TELEPHONE ENCOUNTER
Refill must be reviewed and completed by the office or provider. The refill is unable to be approved or denied by the medication management team.      07/01/2025 07/01/2025 Codeine Phosphate 30 Mg Oral Capsule/caffeine 40 Mg/butalbital 50 Mg/acetaminophen 325 Mg (Capsule) 15.0 4 325 MG-50 MG-40 MG-30 [facility] Commercial Insurance 0 / 0 [place]   1 [id number] ** 06/13/2025 06/13/2025 Amphetamine Salt Combo (Tablet) 30.0 30 10 MG NA JOSELIN [facility] Commercial Insurance 0 / 0 [place]   1 [id number] ** 06/10/2025 06/10/2025 Codeine Phosphate 30 Mg Oral Capsule/caffeine 40 Mg/butalbital 50 Mg/acetaminophen 325 Mg (Capsule) 15.0 3 325 MG-50 MG-40 MG-30 MG 22.50 [facility] Commercial Insurance 0 / 0

## 2025-07-08 NOTE — TELEPHONE ENCOUNTER
Chart reviewed. Provided with refill to cover until next scheduled visit.  [place] PDMP has been checked and patient is filling controlled substances appropriately.

## 2025-07-08 NOTE — TELEPHONE ENCOUNTER
Reason for call:   [x] Refill   [] Prior Auth  [] Other:     Office:   [] PCP/Provider -   [x] Specialty/Provider - PSYCH    Medication: amphetamine-dextroamphetamine (ADDERALL, 10MG,) 10 mg tablet     Dose/Frequency: Take 1 tablet (10 mg total) by mouth daily after lunch     Quantity: 30    Pharmacy: [facility] #[id number] [place] [address] [place]       Medication: lisdexamfetamine (Vyvanse) 40 MG capsule     Dose/Frequency:  Take 1 capsule (40 mg total) by mouth every morning     Quantity: 30    Pharmacy:  [facility] ([place]) - [place] - [address]     Local Pharmacy   Does the patient have enough for 3 days?   [] Yes   [x] No - Send as HP to POD    Mail Away Pharmacy   Does the patient have enough for 10 days?   [] Yes   [] No - Send as HP to POD

## 2025-08-05 ENCOUNTER — TELEPHONE (OUTPATIENT)
Age: 35
End: 2025-08-05

## 2025-08-06 ENCOUNTER — TELEMEDICINE (OUTPATIENT)
Dept: PSYCHIATRY | Facility: CLINIC | Age: 35
End: 2025-08-06

## 2025-08-06 DIAGNOSIS — F33.42 MDD (MAJOR DEPRESSIVE DISORDER), RECURRENT, IN FULL REMISSION (HCC): ICD-10-CM

## 2025-08-06 DIAGNOSIS — F41.1 GENERALIZED ANXIETY DISORDER: ICD-10-CM

## 2025-08-06 DIAGNOSIS — F90.0 ADHD (ATTENTION DEFICIT HYPERACTIVITY DISORDER), INATTENTIVE TYPE: Primary | ICD-10-CM

## 2025-08-06 DIAGNOSIS — F19.981 DRUG-INDUCED SEXUAL DYSFUNCTION (HCC): ICD-10-CM

## 2025-08-06 RX ORDER — DEXTROAMPHETAMINE SACCHARATE, AMPHETAMINE ASPARTATE, DEXTROAMPHETAMINE SULFATE AND AMPHETAMINE SULFATE 2.5; 2.5; 2.5; 2.5 MG/1; MG/1; MG/1; MG/1
10 TABLET ORAL
Qty: 30 TABLET | Refills: 0 | Status: SHIPPED | OUTPATIENT
Start: 2025-08-06 | End: 2025-09-05

## 2025-08-06 RX ORDER — BUPROPION HYDROCHLORIDE 150 MG/1
150 TABLET, EXTENDED RELEASE ORAL DAILY
Qty: 30 TABLET | Refills: 1 | Status: SHIPPED | OUTPATIENT
Start: 2025-08-06 | End: 2025-10-05

## 2025-08-06 RX ORDER — LISDEXAMFETAMINE DIMESYLATE 40 MG/1
40 CAPSULE ORAL EVERY MORNING
Qty: 30 CAPSULE | Refills: 0 | Status: SHIPPED | OUTPATIENT
Start: 2025-08-06 | End: 2025-09-05

## 2025-08-08 DIAGNOSIS — G43.111 INTRACTABLE MIGRAINE WITH AURA WITH STATUS MIGRAINOSUS: ICD-10-CM

## 2025-08-08 RX ORDER — BUTALBITAL, ACETAMINOPHEN, CAFFEINE AND CODEINE PHOSPHATE 50; 325; 40; 30 MG/1; MG/1; MG/1; MG/1
1 CAPSULE ORAL EVERY 6 HOURS PRN
Qty: 15 CAPSULE | Refills: 0 | Status: SHIPPED | OUTPATIENT
Start: 2025-08-08

## 2025-08-08 RX ORDER — BUTALBITAL, ACETAMINOPHEN, CAFFEINE AND CODEINE PHOSPHATE 50; 325; 40; 30 MG/1; MG/1; MG/1; MG/1
1 CAPSULE ORAL EVERY 6 HOURS PRN
Qty: 15 CAPSULE | Refills: 0 | OUTPATIENT
Start: 2025-08-08

## 2025-08-16 DIAGNOSIS — Z30.9 ENCOUNTER FOR CONTRACEPTIVE MANAGEMENT, UNSPECIFIED TYPE: ICD-10-CM

## 2025-08-18 RX ORDER — DROSPIRENONE AND ETHINYL ESTRADIOL 0.02-3(28)
1 KIT ORAL DAILY
Qty: 84 TABLET | Refills: 1 | Status: SHIPPED | OUTPATIENT
Start: 2025-08-18